# Patient Record
Sex: MALE | NOT HISPANIC OR LATINO | URBAN - METROPOLITAN AREA
[De-identification: names, ages, dates, MRNs, and addresses within clinical notes are randomized per-mention and may not be internally consistent; named-entity substitution may affect disease eponyms.]

---

## 2023-06-08 PROBLEM — F19.959 SUBSTANCE-INDUCED PSYCHOTIC DISORDER: Status: ACTIVE | Noted: 2023-06-08

## 2023-06-08 PROBLEM — F20.9 SCHIZOPHRENIA: Status: ACTIVE | Noted: 2023-06-08

## 2023-06-08 PROBLEM — F12.90 CANNABIS USE DISORDER: Status: ACTIVE | Noted: 2023-06-08

## 2023-07-02 ENCOUNTER — HOSPITAL ENCOUNTER (EMERGENCY)
Facility: HOSPITAL | Age: 23
Discharge: HOME | End: 2023-07-02
Attending: EMERGENCY MEDICINE
Payer: MEDICAID

## 2023-07-02 VITALS
OXYGEN SATURATION: 97 % | HEART RATE: 81 BPM | RESPIRATION RATE: 16 BRPM | WEIGHT: 155 LBS | TEMPERATURE: 98 F | SYSTOLIC BLOOD PRESSURE: 139 MMHG | DIASTOLIC BLOOD PRESSURE: 68 MMHG | HEIGHT: 63 IN | BODY MASS INDEX: 27.46 KG/M2

## 2023-07-02 DIAGNOSIS — R25.2 MUSCLE CRAMPS: ICD-10-CM

## 2023-07-02 DIAGNOSIS — G24.01 TARDIVE DYSKINESIA: Primary | ICD-10-CM

## 2023-07-02 DIAGNOSIS — F12.90 MARIJUANA USE: ICD-10-CM

## 2023-07-02 LAB
ALBUMIN SERPL-MCNC: 4 G/DL (ref 3.5–5)
ALBUMIN/GLOB SERPL: 1.3 RATIO (ref 1.1–2)
ALP SERPL-CCNC: 47 UNIT/L (ref 40–150)
ALT SERPL-CCNC: 97 UNIT/L (ref 0–55)
AMPHET UR QL SCN: NEGATIVE
APPEARANCE UR: CLEAR
AST SERPL-CCNC: 36 UNIT/L (ref 5–34)
BACTERIA #/AREA URNS AUTO: NORMAL /HPF
BARBITURATE SCN PRESENT UR: NEGATIVE
BASOPHILS # BLD AUTO: 0.08 X10(3)/MCL
BASOPHILS NFR BLD AUTO: 0.9 %
BENZODIAZ UR QL SCN: NEGATIVE
BILIRUB UR QL STRIP.AUTO: NEGATIVE MG/DL
BILIRUBIN DIRECT+TOT PNL SERPL-MCNC: 0.5 MG/DL
BUN SERPL-MCNC: 15.2 MG/DL (ref 8.9–20.6)
CALCIUM SERPL-MCNC: 9.7 MG/DL (ref 8.4–10.2)
CANNABINOIDS UR QL SCN: POSITIVE
CHLORIDE SERPL-SCNC: 105 MMOL/L (ref 98–107)
CK SERPL-CCNC: 128 U/L (ref 30–200)
CO2 SERPL-SCNC: 29 MMOL/L (ref 22–29)
COCAINE UR QL SCN: NEGATIVE
COLOR UR: YELLOW
CREAT SERPL-MCNC: 1.03 MG/DL (ref 0.73–1.18)
EOSINOPHIL # BLD AUTO: 0.34 X10(3)/MCL (ref 0–0.9)
EOSINOPHIL NFR BLD AUTO: 3.7 %
ERYTHROCYTE [DISTWIDTH] IN BLOOD BY AUTOMATED COUNT: 11.8 % (ref 11.5–17)
FENTANYL UR QL SCN: NEGATIVE
GFR SERPLBLD CREATININE-BSD FMLA CKD-EPI: >60 MLS/MIN/1.73/M2
GLOBULIN SER-MCNC: 3.1 GM/DL (ref 2.4–3.5)
GLUCOSE SERPL-MCNC: 98 MG/DL (ref 74–100)
GLUCOSE UR QL STRIP.AUTO: NEGATIVE MG/DL
HCT VFR BLD AUTO: 41.2 % (ref 42–52)
HGB BLD-MCNC: 13.6 G/DL (ref 14–18)
IMM GRANULOCYTES # BLD AUTO: 0.35 X10(3)/MCL (ref 0–0.04)
IMM GRANULOCYTES NFR BLD AUTO: 3.9 %
KETONES UR QL STRIP.AUTO: NEGATIVE MG/DL
LEUKOCYTE ESTERASE UR QL STRIP.AUTO: NEGATIVE UNIT/L
LYMPHOCYTES # BLD AUTO: 1.38 X10(3)/MCL (ref 0.6–4.6)
LYMPHOCYTES NFR BLD AUTO: 15.2 %
MAGNESIUM SERPL-MCNC: 2.2 MG/DL (ref 1.6–2.6)
MCH RBC QN AUTO: 31.8 PG (ref 27–31)
MCHC RBC AUTO-ENTMCNC: 33 G/DL (ref 33–36)
MCV RBC AUTO: 96.3 FL (ref 80–94)
MDMA UR QL SCN: NEGATIVE
MONOCYTES # BLD AUTO: 0.86 X10(3)/MCL (ref 0.1–1.3)
MONOCYTES NFR BLD AUTO: 9.5 %
NEUTROPHILS # BLD AUTO: 6.08 X10(3)/MCL (ref 2.1–9.2)
NEUTROPHILS NFR BLD AUTO: 66.8 %
NITRITE UR QL STRIP.AUTO: NEGATIVE
NRBC BLD AUTO-RTO: 0 %
OPIATES UR QL SCN: NEGATIVE
PCP UR QL: NEGATIVE
PH UR STRIP.AUTO: 8 [PH]
PH UR: 8 [PH] (ref 3–11)
PLATELET # BLD AUTO: 326 X10(3)/MCL (ref 130–400)
PMV BLD AUTO: 9.2 FL (ref 7.4–10.4)
POTASSIUM SERPL-SCNC: 4 MMOL/L (ref 3.5–5.1)
PROT SERPL-MCNC: 7.1 GM/DL (ref 6.4–8.3)
PROT UR QL STRIP.AUTO: NEGATIVE MG/DL
RBC # BLD AUTO: 4.28 X10(6)/MCL (ref 4.7–6.1)
RBC #/AREA URNS AUTO: <5 /HPF
RBC UR QL AUTO: NEGATIVE UNIT/L
SODIUM SERPL-SCNC: 142 MMOL/L (ref 136–145)
SP GR UR STRIP.AUTO: 1.01 (ref 1–1.03)
SPECIFIC GRAVITY, URINE AUTO (.000) (OHS): 1.01 (ref 1–1.03)
SQUAMOUS #/AREA URNS AUTO: <5 /HPF
UROBILINOGEN UR STRIP-ACNC: 0.2 MG/DL
WBC # SPEC AUTO: 9.09 X10(3)/MCL (ref 4.5–11.5)
WBC #/AREA URNS AUTO: <5 /HPF

## 2023-07-02 PROCEDURE — 82550 ASSAY OF CK (CPK): CPT | Performed by: EMERGENCY MEDICINE

## 2023-07-02 PROCEDURE — 83735 ASSAY OF MAGNESIUM: CPT | Performed by: EMERGENCY MEDICINE

## 2023-07-02 PROCEDURE — 99284 EMERGENCY DEPT VISIT MOD MDM: CPT

## 2023-07-02 PROCEDURE — 93010 ELECTROCARDIOGRAM REPORT: CPT | Mod: ,,, | Performed by: INTERNAL MEDICINE

## 2023-07-02 PROCEDURE — 81001 URINALYSIS AUTO W/SCOPE: CPT | Performed by: NURSE PRACTITIONER

## 2023-07-02 PROCEDURE — 85025 COMPLETE CBC W/AUTO DIFF WBC: CPT | Performed by: NURSE PRACTITIONER

## 2023-07-02 PROCEDURE — 93005 ELECTROCARDIOGRAM TRACING: CPT

## 2023-07-02 PROCEDURE — 96372 THER/PROPH/DIAG INJ SC/IM: CPT | Performed by: EMERGENCY MEDICINE

## 2023-07-02 PROCEDURE — 93010 EKG 12-LEAD: ICD-10-PCS | Mod: ,,, | Performed by: INTERNAL MEDICINE

## 2023-07-02 PROCEDURE — 80053 COMPREHEN METABOLIC PANEL: CPT | Performed by: NURSE PRACTITIONER

## 2023-07-02 PROCEDURE — 80307 DRUG TEST PRSMV CHEM ANLYZR: CPT | Performed by: EMERGENCY MEDICINE

## 2023-07-02 PROCEDURE — 63600175 PHARM REV CODE 636 W HCPCS: Performed by: EMERGENCY MEDICINE

## 2023-07-02 RX ORDER — BENZTROPINE MESYLATE 1 MG/ML
1 INJECTION, SOLUTION INTRAMUSCULAR; INTRAVENOUS
Status: COMPLETED | OUTPATIENT
Start: 2023-07-02 | End: 2023-07-02

## 2023-07-02 RX ORDER — BENZTROPINE MESYLATE 1 MG/1
1 TABLET ORAL 2 TIMES DAILY
Qty: 60 TABLET | Refills: 1 | Status: SHIPPED | OUTPATIENT
Start: 2023-07-02 | End: 2024-07-01

## 2023-07-02 RX ADMIN — BENZTROPINE MESYLATE 1 MG: 1 INJECTION INTRAMUSCULAR; INTRAVENOUS at 01:07

## 2023-07-02 NOTE — FIRST PROVIDER EVALUATION
Medical screening examination initiated.  I have conducted a focused provider triage encounter, findings are as follows:    Brief history of present illness:  22y/o M presents to the ED with mother reports concerns to his Invega injection. Mother reports stiff muscles    There were no vitals filed for this visit.    Pertinent physical exam:  AAA x 3    Brief workup plan:  Labs/EKG    Preliminary workup initiated; this workup will be continued and followed by the physician or advanced practice provider that is assigned to the patient when roomed.

## 2023-07-02 NOTE — DISCHARGE INSTRUCTIONS
Pt to triage with mask, c/o epigastric pain x 1 hour. Denies pain meds PTA. Reports nausea, denies vomiting.   Pt yelling, hyperventilating in triage Thanks for letting us take care of you today!  It is our goal to give you courteous care and to keep you comfortable and informed, if you have any questions before you leave I will be happy to try and answer them.    Here is some advice after your visit:      Your visit in the emergency department is NOT definitive care - please follow-up with your primary care doctor and/or specialist within 1-2 days.  Please return if you have any worsening in your condition or if you have any other concerns.    If you had radiology exams like an XRAY or CT in the emergency Department the interpreation on them may be preliminary - there may be less time sensitive findings on the reports please obtain these reports within 24 hours from the hospital or by using your out on your mobile phone to access records.  Bring these to your primary care doctor and/or specialist for further review of incidental findings.    Please review any LAB WORK from your visit today with your primary care physician.    If you were prescribed OPIATE PAIN MEDICATION - please understand of these medications can be addictive, you may fill less of the prescription was written for, you do not have to take the full prescription.  You may discard what you do not use.  Please seek help if you feel you are having problems with addiction.  Do not drive or operate heavy machinery if you are taking sedating medications.  Do not mix these medications with alcohol.      If you had a SPLINT placed in the emergency department if you have severe pain numbness tingling or discoloration of year digits please remove the splint and return to the emergency department for further evaluation as this may represent a sign of compromise to the nerves or blood vessels due to swelling.    If you had SUTURES in the emergency department please have them removed in the prescribed time frame typically within 7-14 days.  You may shower but please do not bathe or swim.  Keep the wounds  clean and dry and covered with a clean dressing.  Please return if he have any signs of infection like redness or drainage or pain at the suture site.    Please take the full course of  any ANTIBIOTICS you were prescribed - incomplete courses of antibiotics can cause resistance to antibiotics in the future which will make it difficult to treat any infections you may have.

## 2023-07-02 NOTE — ED PROVIDER NOTES
Encounter Date: 7/2/2023    SCRIBE #1 NOTE: I, Mo Bender, am scribing for, and in the presence of,  Dr. Gray. I have scribed the following portions of the note - Other sections scribed: HPI, ROS, Physical Exam, MDM, Attending.     History     Chief Complaint   Patient presents with    Medication Reaction     C/o muscle twitching and stiffening since starting Invega injections a week ago. Pt states it is hard for him to open his mouth and talk.      24 y/o male with history of schizophrenia presents to ED with mother for muscle stiffening and twitching onset about a week ago after starting Invega injections per mother.  Mother states initially, pt was pacing a lot, so he was started on propranolol and trazodone by his psychiatrist to help him sleep.  Pt has never had similar symptoms.  He does not take cogentin or benadryl.      The history is provided by a parent.   Medication Reaction  This is a new problem. Episode onset: about a week ago. The problem occurs constantly. The problem has not changed since onset.Pertinent negatives include no chest pain, no abdominal pain, no headaches and no shortness of breath.   Review of patient's allergies indicates:  No Known Allergies  Past Medical History:   Diagnosis Date    Schizophrenia, unspecified      No past surgical history on file.  No family history on file.  Social History     Tobacco Use    Smoking status: Every Day     Types: Vaping with nicotine   Substance Use Topics    Alcohol use: Yes    Drug use: Not Currently     Review of Systems   Constitutional:  Negative for chills and fever.   HENT:  Negative for congestion and ear pain.    Eyes:  Negative for discharge.   Respiratory:  Negative for cough, shortness of breath and wheezing.    Cardiovascular:  Negative for chest pain and leg swelling.   Gastrointestinal:  Negative for abdominal pain, constipation, diarrhea, nausea and vomiting.   Genitourinary:  Negative for dysuria, flank pain and frequency.    Musculoskeletal:  Negative for back pain and joint swelling.        Muscle stiffness/twitching    Skin:  Negative for rash.   Neurological:  Negative for dizziness, weakness and headaches.   Psychiatric/Behavioral:  Negative for agitation, confusion and hallucinations.      Physical Exam     Initial Vitals [07/02/23 1259]   BP Pulse Resp Temp SpO2   138/84 100 16 97.7 °F (36.5 °C) 95 %      MAP       --         Physical Exam    Nursing note and vitals reviewed.  Constitutional: He appears well-developed. No distress.   HENT:   Head: Normocephalic and atraumatic.   Mouth/Throat: Oropharynx is clear and moist.   No rash to face; no desquamation of eyes or mouth   Eyes: Conjunctivae and EOM are normal. Pupils are equal, round, and reactive to light.   Neck: Neck supple.   Cardiovascular:  Normal rate and regular rhythm.           No murmur heard.  Pulmonary/Chest: Breath sounds normal. No respiratory distress. He exhibits no tenderness.   Abdominal: Abdomen is soft. Bowel sounds are normal. He exhibits no distension. There is no abdominal tenderness.   Musculoskeletal:         General: Normal range of motion.      Cervical back: Neck supple.      Lumbar back: Normal. No tenderness. Normal range of motion.     Neurological: He is alert and oriented to person, place, and time. He has normal strength. No cranial nerve deficit or sensory deficit.   Psychiatric: Judgment normal.   Flat affect; no psychomotor agitation        ED Course   Procedures  Labs Reviewed   COMPREHENSIVE METABOLIC PANEL - Abnormal; Notable for the following components:       Result Value    Alanine Aminotransferase 97 (*)     Aspartate Aminotransferase 36 (*)     All other components within normal limits   CBC WITH DIFFERENTIAL - Abnormal; Notable for the following components:    RBC 4.28 (*)     Hgb 13.6 (*)     Hct 41.2 (*)     MCV 96.3 (*)     MCH 31.8 (*)     IG# 0.35 (*)     All other components within normal limits   DRUG SCREEN, URINE  (BEAKER) - Abnormal; Notable for the following components:    Cannabinoids, Urine Positive (*)     All other components within normal limits    Narrative:     Cut off concentrations:    Amphetamines - 1000 ng/ml  Barbiturates - 200 ng/ml  Benzodiazepine - 200 ng/ml  Cannabinoids (THC) - 50 ng/ml  Cocaine - 300 ng/ml  Fentanyl - 1.0 ng/ml  MDMA - 500 ng/ml  Opiates - 300 ng/ml   Phencyclidine (PCP) - 25 ng/ml    Specimen submitted for drug analysis and tested for pH and specific gravity in order to evaluate sample integrity. Suspect tampering if specific gravity is <1.003 and/or pH is not within the range of 4.5 - 8.0  False negatives may result form substances such as bleach added to urine.  False positives may result for the presence of a substance with similar chemical structure to the drug or its metabolite.    This test provides only a PRELIMINARY analytical test result. A more specific alternate chemical method must be used in order to obtain a confirmed analytical result. Gas chromatography/mass spectrometry (GC/MS) is the preferred confirmatory method. Other chemical confirmation methods are available. Clinical consideration and professional judgement should be applied to any drug of abuse test result, particularly when preliminary positive results are used.    Positive results will be confirmed only at the physicians request. Unconfirmed screening results are to be used only for medical purposes (treatment).        URINALYSIS, REFLEX TO URINE CULTURE - Normal   CK - Normal   MAGNESIUM - Normal   URINALYSIS, MICROSCOPIC - Normal   CBC W/ AUTO DIFFERENTIAL    Narrative:     The following orders were created for panel order CBC Auto Differential.  Procedure                               Abnormality         Status                     ---------                               -----------         ------                     CBC with Differential[[id number]]        Abnormal            Final result                  Please view results for these tests on the individual orders.          Imaging Results    None          Medications   benztropine mesylate injection 1 mg (1 mg Intramuscular Given 7/2/23 1320)              Scribe Attestation:   Scribe #1: I performed the above scribed service and the documentation accurately describes the services I performed. I attest to the accuracy of the note.    Attending Attestation:           Physician Attestation for Scribe:  Physician Attestation Statement for Scribe #1: I, reviewed documentation, as scribed by Mo Bender in my presence, and it is both accurate and complete.         Medical Decision Making  Differential diagnoses include, but are not limited to: tardive dyskinesia, neuroleptic malignant syndrome     Amount and/or Complexity of Data Reviewed  Independent Historian: parent     Details: muscle stiffening and twitching onset about a week ago after starting Invega injections per mother.  Mother states initially, pt was pacing a lot, so he was started on propranolol and trazodone by his psychiatrist to help him sleep.  Pt has never had similar symptoms.  He does not take cogentin or benadryl.  Labs: ordered.                         Clinical Impression:   Final diagnoses:  [R25.2] Muscle cramps  [G24.01] Tardive dyskinesia (Primary)  [F12.90] Marijuana use        ED Disposition Condition    Discharge Stable          ED Prescriptions       Medication Sig Dispense Start Date End Date Auth. Provider    benztropine (COGENTIN) 1 MG tablet Take 1 tablet (1 mg total) by mouth 2 (two) times daily. 60 tablet 7/2/2023 7/1/2024 Osorio Gray MD          Follow-up Information       Follow up With Specialties Details Why Contact Info    PCP  Call in 1 day  follow up with PCP ni 1-2 days             Osorio Gray MD  07/02/23 2901

## 2024-05-08 ENCOUNTER — HOSPITAL ENCOUNTER (INPATIENT)
Facility: HOSPITAL | Age: 24
LOS: 7 days | Discharge: NURSING, REHAB OR LONG-TERM CARE | DRG: 956 | End: 2024-05-15
Attending: EMERGENCY MEDICINE | Admitting: STUDENT IN AN ORGANIZED HEALTH CARE EDUCATION/TRAINING PROGRAM
Payer: COMMERCIAL

## 2024-05-08 ENCOUNTER — ANESTHESIA (OUTPATIENT)
Dept: SURGERY | Facility: HOSPITAL | Age: 24
DRG: 956 | End: 2024-05-08
Payer: COMMERCIAL

## 2024-05-08 ENCOUNTER — ANESTHESIA EVENT (OUTPATIENT)
Dept: SURGERY | Facility: HOSPITAL | Age: 24
DRG: 956 | End: 2024-05-08
Payer: COMMERCIAL

## 2024-05-08 DIAGNOSIS — S92.301A CLOSED NONDISPLACED FRACTURE OF METATARSAL BONE OF RIGHT FOOT, UNSPECIFIED METATARSAL, INITIAL ENCOUNTER: ICD-10-CM

## 2024-05-08 DIAGNOSIS — S09.90XA CLOSED HEAD INJURY, INITIAL ENCOUNTER: ICD-10-CM

## 2024-05-08 DIAGNOSIS — S01.81XA FACIAL LACERATION, INITIAL ENCOUNTER: ICD-10-CM

## 2024-05-08 DIAGNOSIS — S52.614A CLOSED NONDISPLACED FRACTURE OF STYLOID PROCESS OF RIGHT ULNA, INITIAL ENCOUNTER: ICD-10-CM

## 2024-05-08 DIAGNOSIS — S82.891B TYPE I OR II OPEN FRACTURE OF RIGHT ANKLE, INITIAL ENCOUNTER: ICD-10-CM

## 2024-05-08 DIAGNOSIS — S82.141A CLOSED FRACTURE OF RIGHT TIBIAL PLATEAU, INITIAL ENCOUNTER: ICD-10-CM

## 2024-05-08 DIAGNOSIS — S52.501A CLOSED FRACTURE OF DISTAL END OF RIGHT RADIUS, UNSPECIFIED FRACTURE MORPHOLOGY, INITIAL ENCOUNTER: ICD-10-CM

## 2024-05-08 DIAGNOSIS — S32.591A CLOSED FRACTURE OF RAMUS OF RIGHT PUBIS, INITIAL ENCOUNTER: ICD-10-CM

## 2024-05-08 DIAGNOSIS — S27.321A CONTUSION OF RIGHT LUNG, INITIAL ENCOUNTER: ICD-10-CM

## 2024-05-08 DIAGNOSIS — T14.90XA TRAUMA: ICD-10-CM

## 2024-05-08 DIAGNOSIS — S62.235A CLOSED NONDISPLACED FRACTURE OF BASE OF FIRST METACARPAL BONE OF LEFT HAND, UNSPECIFIED FRACTURE MORPHOLOGY, INITIAL ENCOUNTER: ICD-10-CM

## 2024-05-08 DIAGNOSIS — S22.32XA CLOSED FRACTURE OF ONE RIB OF LEFT SIDE, INITIAL ENCOUNTER: ICD-10-CM

## 2024-05-08 DIAGNOSIS — S32.401A CLOSED NONDISPLACED FRACTURE OF RIGHT ACETABULUM, UNSPECIFIED PORTION OF ACETABULUM, INITIAL ENCOUNTER: ICD-10-CM

## 2024-05-08 DIAGNOSIS — S72.301A CLOSED FRACTURE OF SHAFT OF RIGHT FEMUR, UNSPECIFIED FRACTURE MORPHOLOGY, INITIAL ENCOUNTER: Primary | ICD-10-CM

## 2024-05-08 PROBLEM — N17.9 AKI (ACUTE KIDNEY INJURY): Status: ACTIVE | Noted: 2024-05-08

## 2024-05-08 PROBLEM — S92.901A CLOSED FRACTURE OF RIGHT FOOT: Status: ACTIVE | Noted: 2024-05-08

## 2024-05-08 PROBLEM — S32.599A CLOSED FRACTURE OF PUBIC RAMUS: Status: ACTIVE | Noted: 2024-05-08

## 2024-05-08 PROBLEM — D72.829 LEUKOCYTOSIS: Status: ACTIVE | Noted: 2024-05-08

## 2024-05-08 PROBLEM — S52.201A FRACTURE OF RIGHT ULNA: Status: ACTIVE | Noted: 2024-05-08

## 2024-05-08 PROBLEM — E87.20 LACTIC ACIDOSIS: Status: ACTIVE | Noted: 2024-05-08

## 2024-05-08 PROBLEM — S27.329A PULMONARY CONTUSION: Status: ACTIVE | Noted: 2024-05-08

## 2024-05-08 PROBLEM — S82.001A CLOSED FRACTURE OF RIGHT PATELLA: Status: ACTIVE | Noted: 2024-05-08

## 2024-05-08 PROBLEM — S72.8X1A OTHER FRACTURE OF RIGHT FEMUR, INITIAL ENCOUNTER FOR CLOSED FRACTURE: Status: ACTIVE | Noted: 2024-05-08

## 2024-05-08 LAB
ABORH RETYPE: NORMAL
ABS NEUT (OLG): 26.11 X10(3)/MCL (ref 2.1–9.2)
ALBUMIN SERPL-MCNC: 4.6 G/DL (ref 3.5–5)
ALBUMIN/GLOB SERPL: 1.3 RATIO (ref 1.1–2)
ALP SERPL-CCNC: 73 UNIT/L (ref 40–150)
ALT SERPL-CCNC: 101 UNIT/L (ref 0–55)
APTT PPP: 28.8 SECONDS (ref 23.2–33.7)
AST SERPL-CCNC: 116 UNIT/L (ref 5–34)
BILIRUB SERPL-MCNC: 0.7 MG/DL
BUN SERPL-MCNC: 15.2 MG/DL (ref 8.9–20.6)
CALCIUM SERPL-MCNC: 9.5 MG/DL (ref 8.4–10.2)
CHLORIDE SERPL-SCNC: 102 MMOL/L (ref 98–107)
CK SERPL-CCNC: 870 U/L (ref 30–200)
CO2 SERPL-SCNC: 23 MMOL/L (ref 22–29)
CREAT SERPL-MCNC: 1.39 MG/DL (ref 0.73–1.18)
ERYTHROCYTE [DISTWIDTH] IN BLOOD BY AUTOMATED COUNT: 11.9 % (ref 11.5–17)
ETHANOL SERPL-MCNC: <10 MG/DL
GFR SERPLBLD CREATININE-BSD FMLA CKD-EPI: >60 ML/MIN/1.73/M2
GLOBULIN SER-MCNC: 3.6 GM/DL (ref 2.4–3.5)
GLUCOSE SERPL-MCNC: 183 MG/DL (ref 74–100)
GROUP & RH: NORMAL
HCT VFR BLD AUTO: 45.9 % (ref 42–52)
HGB BLD-MCNC: 15.9 G/DL (ref 14–18)
INDIRECT COOMBS: NORMAL
INR PPP: 1.1
INSTRUMENT WBC (OLG): 30.36 X10(3)/MCL
LACTATE SERPL-SCNC: 2.7 MMOL/L (ref 0.5–2.2)
LACTATE SERPL-SCNC: 4.4 MMOL/L (ref 0.5–2.2)
LACTATE SERPL-SCNC: 4.4 MMOL/L (ref 0.5–2.2)
LYMPHOCYTES NFR BLD MANUAL: 10 %
LYMPHOCYTES NFR BLD MANUAL: 3.04 X10(3)/MCL
MCH RBC QN AUTO: 31.4 PG (ref 27–31)
MCHC RBC AUTO-ENTMCNC: 34.6 G/DL (ref 33–36)
MCV RBC AUTO: 90.7 FL (ref 80–94)
MONOCYTES NFR BLD MANUAL: 1.21 X10(3)/MCL (ref 0.1–1.3)
MONOCYTES NFR BLD MANUAL: 4 %
NEUTROPHILS NFR BLD MANUAL: 86 %
NRBC BLD AUTO-RTO: 0 %
PLATELET # BLD AUTO: 367 X10(3)/MCL (ref 130–400)
PLATELET # BLD EST: NORMAL 10*3/UL
PMV BLD AUTO: 10.1 FL (ref 7.4–10.4)
POTASSIUM SERPL-SCNC: 4.7 MMOL/L (ref 3.5–5.1)
PROT SERPL-MCNC: 8.2 GM/DL (ref 6.4–8.3)
PROTHROMBIN TIME: 13.7 SECONDS (ref 12.5–14.5)
RBC # BLD AUTO: 5.06 X10(6)/MCL (ref 4.7–6.1)
RBC MORPH BLD: NORMAL
SODIUM SERPL-SCNC: 139 MMOL/L (ref 136–145)
SPECIMEN OUTDATE: NORMAL
WBC # SPEC AUTO: 30.36 X10(3)/MCL (ref 4.5–11.5)

## 2024-05-08 PROCEDURE — 82550 ASSAY OF CK (CPK): CPT | Performed by: NURSE PRACTITIONER

## 2024-05-08 PROCEDURE — 83605 ASSAY OF LACTIC ACID: CPT | Performed by: STUDENT IN AN ORGANIZED HEALTH CARE EDUCATION/TRAINING PROGRAM

## 2024-05-08 PROCEDURE — 25000003 PHARM REV CODE 250: Performed by: REHABILITATION UNIT

## 2024-05-08 PROCEDURE — 86850 RBC ANTIBODY SCREEN: CPT | Performed by: STUDENT IN AN ORGANIZED HEALTH CARE EDUCATION/TRAINING PROGRAM

## 2024-05-08 PROCEDURE — 36000708 HC OR TIME LEV III 1ST 15 MIN: Performed by: REHABILITATION UNIT

## 2024-05-08 PROCEDURE — 20650 INSERT AND REMOVE BONE PIN: CPT | Mod: 51,,, | Performed by: REHABILITATION UNIT

## 2024-05-08 PROCEDURE — D9220A PRA ANESTHESIA: Mod: ANES,,, | Performed by: ANESTHESIOLOGY

## 2024-05-08 PROCEDURE — 0HQ0XZZ REPAIR SCALP SKIN, EXTERNAL APPROACH: ICD-10-PCS | Performed by: EMERGENCY MEDICINE

## 2024-05-08 PROCEDURE — 25605 CLTX DST RDL FX/EPHYS SEP W/: CPT | Mod: RT

## 2024-05-08 PROCEDURE — 83605 ASSAY OF LACTIC ACID: CPT | Performed by: REHABILITATION UNIT

## 2024-05-08 PROCEDURE — 90715 TDAP VACCINE 7 YRS/> IM: CPT | Performed by: STUDENT IN AN ORGANIZED HEALTH CARE EDUCATION/TRAINING PROGRAM

## 2024-05-08 PROCEDURE — 12011 RPR F/E/E/N/L/M 2.5 CM/<: CPT

## 2024-05-08 PROCEDURE — 25500020 PHARM REV CODE 255: Performed by: STUDENT IN AN ORGANIZED HEALTH CARE EDUCATION/TRAINING PROGRAM

## 2024-05-08 PROCEDURE — G0390 TRAUMA RESPONS W/HOSP CRITI: HCPCS

## 2024-05-08 PROCEDURE — 71000033 HC RECOVERY, INTIAL HOUR: Performed by: REHABILITATION UNIT

## 2024-05-08 PROCEDURE — 90471 IMMUNIZATION ADMIN: CPT | Performed by: STUDENT IN AN ORGANIZED HEALTH CARE EDUCATION/TRAINING PROGRAM

## 2024-05-08 PROCEDURE — 25000003 PHARM REV CODE 250: Performed by: NURSE ANESTHETIST, CERTIFIED REGISTERED

## 2024-05-08 PROCEDURE — 36000709 HC OR TIME LEV III EA ADD 15 MIN: Performed by: REHABILITATION UNIT

## 2024-05-08 PROCEDURE — 63600175 PHARM REV CODE 636 W HCPCS: Performed by: NURSE ANESTHETIST, CERTIFIED REGISTERED

## 2024-05-08 PROCEDURE — 26600 TREAT METACARPAL FRACTURE: CPT | Mod: 51,FA,, | Performed by: REHABILITATION UNIT

## 2024-05-08 PROCEDURE — 3E0234Z INTRODUCTION OF SERUM, TOXOID AND VACCINE INTO MUSCLE, PERCUTANEOUS APPROACH: ICD-10-PCS | Performed by: SURGERY

## 2024-05-08 PROCEDURE — 63600175 PHARM REV CODE 636 W HCPCS: Performed by: NURSE PRACTITIONER

## 2024-05-08 PROCEDURE — D9220A PRA ANESTHESIA: Mod: CRNA,,, | Performed by: NURSE ANESTHETIST, CERTIFIED REGISTERED

## 2024-05-08 PROCEDURE — 11000001 HC ACUTE MED/SURG PRIVATE ROOM

## 2024-05-08 PROCEDURE — 99285 EMERGENCY DEPT VISIT HI MDM: CPT | Mod: 25

## 2024-05-08 PROCEDURE — 82077 ASSAY SPEC XCP UR&BREATH IA: CPT | Performed by: STUDENT IN AN ORGANIZED HEALTH CARE EDUCATION/TRAINING PROGRAM

## 2024-05-08 PROCEDURE — 80053 COMPREHEN METABOLIC PANEL: CPT | Performed by: STUDENT IN AN ORGANIZED HEALTH CARE EDUCATION/TRAINING PROGRAM

## 2024-05-08 PROCEDURE — 63600175 PHARM REV CODE 636 W HCPCS: Performed by: REHABILITATION UNIT

## 2024-05-08 PROCEDURE — 27792 TREATMENT OF ANKLE FRACTURE: CPT | Mod: RT,,, | Performed by: REHABILITATION UNIT

## 2024-05-08 PROCEDURE — 37000008 HC ANESTHESIA 1ST 15 MINUTES: Performed by: REHABILITATION UNIT

## 2024-05-08 PROCEDURE — 27201423 OPTIME MED/SURG SUP & DEVICES STERILE SUPPLY: Performed by: REHABILITATION UNIT

## 2024-05-08 PROCEDURE — 83605 ASSAY OF LACTIC ACID: CPT | Performed by: NURSE PRACTITIONER

## 2024-05-08 PROCEDURE — 28485 OPTX METATARSAL FX EACH: CPT | Mod: 51,RT,, | Performed by: REHABILITATION UNIT

## 2024-05-08 PROCEDURE — 0HQ1XZZ REPAIR FACE SKIN, EXTERNAL APPROACH: ICD-10-PCS | Performed by: EMERGENCY MEDICINE

## 2024-05-08 PROCEDURE — C1769 GUIDE WIRE: HCPCS | Performed by: REHABILITATION UNIT

## 2024-05-08 PROCEDURE — C1713 ANCHOR/SCREW BN/BN,TIS/BN: HCPCS | Performed by: REHABILITATION UNIT

## 2024-05-08 PROCEDURE — 63600175 PHARM REV CODE 636 W HCPCS

## 2024-05-08 PROCEDURE — 85610 PROTHROMBIN TIME: CPT | Performed by: STUDENT IN AN ORGANIZED HEALTH CARE EDUCATION/TRAINING PROGRAM

## 2024-05-08 PROCEDURE — 63600175 PHARM REV CODE 636 W HCPCS: Performed by: STUDENT IN AN ORGANIZED HEALTH CARE EDUCATION/TRAINING PROGRAM

## 2024-05-08 PROCEDURE — 85730 THROMBOPLASTIN TIME PARTIAL: CPT | Performed by: STUDENT IN AN ORGANIZED HEALTH CARE EDUCATION/TRAINING PROGRAM

## 2024-05-08 PROCEDURE — 12001 RPR S/N/AX/GEN/TRNK 2.5CM/<: CPT | Mod: 59

## 2024-05-08 PROCEDURE — 85025 COMPLETE CBC W/AUTO DIFF WBC: CPT | Performed by: STUDENT IN AN ORGANIZED HEALTH CARE EDUCATION/TRAINING PROGRAM

## 2024-05-08 PROCEDURE — 36415 COLL VENOUS BLD VENIPUNCTURE: CPT | Performed by: NURSE PRACTITIONER

## 2024-05-08 PROCEDURE — 11012 DEB SKIN BONE AT FX SITE: CPT | Mod: 51,,, | Performed by: REHABILITATION UNIT

## 2024-05-08 PROCEDURE — 85007 BL SMEAR W/DIFF WBC COUNT: CPT | Performed by: STUDENT IN AN ORGANIZED HEALTH CARE EDUCATION/TRAINING PROGRAM

## 2024-05-08 PROCEDURE — P9047 ALBUMIN (HUMAN), 25%, 50ML: HCPCS | Mod: JZ,JG | Performed by: NURSE ANESTHETIST, CERTIFIED REGISTERED

## 2024-05-08 PROCEDURE — 99223 1ST HOSP IP/OBS HIGH 75: CPT | Mod: 57,,, | Performed by: REHABILITATION UNIT

## 2024-05-08 PROCEDURE — 0QSN34Z REPOSITION RIGHT METATARSAL WITH INTERNAL FIXATION DEVICE, PERCUTANEOUS APPROACH: ICD-10-PCS | Performed by: REHABILITATION UNIT

## 2024-05-08 PROCEDURE — 2W6QXZZ TRACTION OF RIGHT LOWER LEG: ICD-10-PCS | Performed by: REHABILITATION UNIT

## 2024-05-08 PROCEDURE — 0QSJ04Z REPOSITION RIGHT FIBULA WITH INTERNAL FIXATION DEVICE, OPEN APPROACH: ICD-10-PCS | Performed by: REHABILITATION UNIT

## 2024-05-08 PROCEDURE — 37000009 HC ANESTHESIA EA ADD 15 MINS: Performed by: REHABILITATION UNIT

## 2024-05-08 PROCEDURE — 96365 THER/PROPH/DIAG IV INF INIT: CPT

## 2024-05-08 PROCEDURE — 63600175 PHARM REV CODE 636 W HCPCS: Performed by: EMERGENCY MEDICINE

## 2024-05-08 PROCEDURE — 25000003 PHARM REV CODE 250: Performed by: NURSE PRACTITIONER

## 2024-05-08 DEVICE — SCREW CORTEX 2.7 X 18: Type: IMPLANTABLE DEVICE | Site: ANKLE | Status: FUNCTIONAL

## 2024-05-08 DEVICE — KWIRE TRCR SNGL .062X9IN 1.6MM: Type: IMPLANTABLE DEVICE | Site: TOE | Status: FUNCTIONAL

## 2024-05-08 DEVICE — SCREW BONE LOCK 2.7X10MM: Type: IMPLANTABLE DEVICE | Site: ANKLE | Status: FUNCTIONAL

## 2024-05-08 DEVICE — IMPLANTABLE DEVICE: Type: IMPLANTABLE DEVICE | Site: FEMUR | Status: FUNCTIONAL

## 2024-05-08 DEVICE — SCREW CORTEX 2.7 X 16.: Type: IMPLANTABLE DEVICE | Site: ANKLE | Status: FUNCTIONAL

## 2024-05-08 DEVICE — KWIRE TRCR SNGL .045X9IN 1.1MM: Type: IMPLANTABLE DEVICE | Site: TOE | Status: FUNCTIONAL

## 2024-05-08 DEVICE — SCREW 2.7MM X 18 LOCKING ANGLE: Type: IMPLANTABLE DEVICE | Site: ANKLE | Status: FUNCTIONAL

## 2024-05-08 DEVICE — IMPLANTABLE DEVICE: Type: IMPLANTABLE DEVICE | Site: ANKLE | Status: FUNCTIONAL

## 2024-05-08 DEVICE — SCREW 2.7MM X 16 LOCKING ANGLE: Type: IMPLANTABLE DEVICE | Site: ANKLE | Status: FUNCTIONAL

## 2024-05-08 RX ORDER — TALC
6 POWDER (GRAM) TOPICAL NIGHTLY PRN
Status: DISCONTINUED | OUTPATIENT
Start: 2024-05-08 | End: 2024-05-15 | Stop reason: HOSPADM

## 2024-05-08 RX ORDER — ENOXAPARIN SODIUM 100 MG/ML
40 INJECTION SUBCUTANEOUS EVERY 12 HOURS
Status: DISCONTINUED | OUTPATIENT
Start: 2024-05-08 | End: 2024-05-15 | Stop reason: HOSPADM

## 2024-05-08 RX ORDER — POLYETHYLENE GLYCOL 3350 17 G/17G
17 POWDER, FOR SOLUTION ORAL 2 TIMES DAILY
Status: DISCONTINUED | OUTPATIENT
Start: 2024-05-08 | End: 2024-05-15 | Stop reason: HOSPADM

## 2024-05-08 RX ORDER — DEXAMETHASONE SODIUM PHOSPHATE 4 MG/ML
INJECTION, SOLUTION INTRA-ARTICULAR; INTRALESIONAL; INTRAMUSCULAR; INTRAVENOUS; SOFT TISSUE
Status: DISCONTINUED | OUTPATIENT
Start: 2024-05-08 | End: 2024-05-08

## 2024-05-08 RX ORDER — FENTANYL CITRATE 50 UG/ML
INJECTION, SOLUTION INTRAMUSCULAR; INTRAVENOUS
Status: DISCONTINUED | OUTPATIENT
Start: 2024-05-08 | End: 2024-05-08

## 2024-05-08 RX ORDER — HYDROMORPHONE HYDROCHLORIDE 2 MG/ML
0.2 INJECTION, SOLUTION INTRAMUSCULAR; INTRAVENOUS; SUBCUTANEOUS EVERY 5 MIN PRN
Status: DISCONTINUED | OUTPATIENT
Start: 2024-05-08 | End: 2024-05-08

## 2024-05-08 RX ORDER — PROPOFOL 10 MG/ML
VIAL (ML) INTRAVENOUS
Status: DISCONTINUED | OUTPATIENT
Start: 2024-05-08 | End: 2024-05-08

## 2024-05-08 RX ORDER — LIDOCAINE HYDROCHLORIDE 10 MG/ML
1 INJECTION, SOLUTION EPIDURAL; INFILTRATION; INTRACAUDAL; PERINEURAL ONCE
Status: CANCELLED | OUTPATIENT
Start: 2024-05-08 | End: 2024-05-08

## 2024-05-08 RX ORDER — OXYCODONE HYDROCHLORIDE 10 MG/1
10 TABLET ORAL EVERY 4 HOURS PRN
Status: DISCONTINUED | OUTPATIENT
Start: 2024-05-08 | End: 2024-05-15 | Stop reason: HOSPADM

## 2024-05-08 RX ORDER — ACETAMINOPHEN 10 MG/ML
INJECTION, SOLUTION INTRAVENOUS
Status: DISCONTINUED | OUTPATIENT
Start: 2024-05-08 | End: 2024-05-08

## 2024-05-08 RX ORDER — ONDANSETRON HYDROCHLORIDE 2 MG/ML
INJECTION, SOLUTION INTRAMUSCULAR; INTRAVENOUS
Status: DISCONTINUED | OUTPATIENT
Start: 2024-05-08 | End: 2024-05-08

## 2024-05-08 RX ORDER — DOCUSATE SODIUM 100 MG/1
100 CAPSULE, LIQUID FILLED ORAL 2 TIMES DAILY
Status: DISCONTINUED | OUTPATIENT
Start: 2024-05-08 | End: 2024-05-11

## 2024-05-08 RX ORDER — ONDANSETRON HYDROCHLORIDE 2 MG/ML
INJECTION, SOLUTION INTRAVENOUS CODE/TRAUMA/SEDATION MEDICATION
Status: COMPLETED | OUTPATIENT
Start: 2024-05-08 | End: 2024-05-08

## 2024-05-08 RX ORDER — PROPOFOL 10 MG/ML
INJECTION, EMULSION INTRAVENOUS
Status: DISPENSED
Start: 2024-05-08 | End: 2024-05-09

## 2024-05-08 RX ORDER — PHENYLEPHRINE HYDROCHLORIDE 10 MG/ML
INJECTION INTRAVENOUS
Status: DISCONTINUED | OUTPATIENT
Start: 2024-05-08 | End: 2024-05-08

## 2024-05-08 RX ORDER — ADHESIVE BANDAGE
30 BANDAGE TOPICAL DAILY PRN
Status: DISCONTINUED | OUTPATIENT
Start: 2024-05-08 | End: 2024-05-15 | Stop reason: HOSPADM

## 2024-05-08 RX ORDER — MUPIROCIN 20 MG/G
OINTMENT TOPICAL 2 TIMES DAILY
Status: DISPENSED | OUTPATIENT
Start: 2024-05-08 | End: 2024-05-13

## 2024-05-08 RX ORDER — LIDOCAINE HYDROCHLORIDE 20 MG/ML
INJECTION, SOLUTION EPIDURAL; INFILTRATION; INTRACAUDAL; PERINEURAL
Status: DISCONTINUED | OUTPATIENT
Start: 2024-05-08 | End: 2024-05-08

## 2024-05-08 RX ORDER — SODIUM CHLORIDE 9 MG/ML
1000 INJECTION, SOLUTION INTRAVENOUS
Status: COMPLETED | OUTPATIENT
Start: 2024-05-08 | End: 2024-05-08

## 2024-05-08 RX ORDER — CEFAZOLIN SODIUM 1 G/3ML
INJECTION, POWDER, FOR SOLUTION INTRAMUSCULAR; INTRAVENOUS
Status: COMPLETED
Start: 2024-05-08 | End: 2024-05-08

## 2024-05-08 RX ORDER — CEFAZOLIN SODIUM 2 G/50ML
SOLUTION INTRAVENOUS
Status: COMPLETED | OUTPATIENT
Start: 2024-05-08 | End: 2024-05-08

## 2024-05-08 RX ORDER — CEFAZOLIN SODIUM 2 G/50ML
2 SOLUTION INTRAVENOUS
Status: DISCONTINUED | OUTPATIENT
Start: 2024-05-08 | End: 2024-05-12

## 2024-05-08 RX ORDER — DIPHENHYDRAMINE HYDROCHLORIDE 50 MG/ML
25 INJECTION INTRAMUSCULAR; INTRAVENOUS EVERY 6 HOURS PRN
Status: DISCONTINUED | OUTPATIENT
Start: 2024-05-08 | End: 2024-05-08

## 2024-05-08 RX ORDER — ACETAMINOPHEN 325 MG/1
650 TABLET ORAL EVERY 4 HOURS
Status: DISPENSED | OUTPATIENT
Start: 2024-05-08 | End: 2024-05-13

## 2024-05-08 RX ORDER — GABAPENTIN 300 MG/1
300 CAPSULE ORAL 3 TIMES DAILY
Status: DISCONTINUED | OUTPATIENT
Start: 2024-05-08 | End: 2024-05-15 | Stop reason: HOSPADM

## 2024-05-08 RX ORDER — CEFAZOLIN SODIUM 2 G/50ML
2 SOLUTION INTRAVENOUS ONCE
Status: DISCONTINUED | OUTPATIENT
Start: 2024-05-08 | End: 2024-05-08

## 2024-05-08 RX ORDER — MIDAZOLAM HYDROCHLORIDE 1 MG/ML
INJECTION INTRAMUSCULAR; INTRAVENOUS
Status: DISCONTINUED | OUTPATIENT
Start: 2024-05-08 | End: 2024-05-08

## 2024-05-08 RX ORDER — METHOCARBAMOL 500 MG/1
500 TABLET, FILM COATED ORAL EVERY 8 HOURS
Status: DISCONTINUED | OUTPATIENT
Start: 2024-05-08 | End: 2024-05-15 | Stop reason: HOSPADM

## 2024-05-08 RX ORDER — PROPOFOL 10 MG/ML
VIAL (ML) INTRAVENOUS CODE/TRAUMA/SEDATION MEDICATION
Status: COMPLETED | OUTPATIENT
Start: 2024-05-08 | End: 2024-05-08

## 2024-05-08 RX ORDER — ONDANSETRON HYDROCHLORIDE 2 MG/ML
4 INJECTION, SOLUTION INTRAVENOUS DAILY PRN
Status: DISCONTINUED | OUTPATIENT
Start: 2024-05-08 | End: 2024-05-08

## 2024-05-08 RX ORDER — OXYCODONE HYDROCHLORIDE 5 MG/1
5 TABLET ORAL EVERY 4 HOURS PRN
Status: DISCONTINUED | OUTPATIENT
Start: 2024-05-08 | End: 2024-05-15 | Stop reason: HOSPADM

## 2024-05-08 RX ORDER — SODIUM CHLORIDE, SODIUM GLUCONATE, SODIUM ACETATE, POTASSIUM CHLORIDE AND MAGNESIUM CHLORIDE 30; 37; 368; 526; 502 MG/100ML; MG/100ML; MG/100ML; MG/100ML; MG/100ML
INJECTION, SOLUTION INTRAVENOUS CONTINUOUS
Status: CANCELLED | OUTPATIENT
Start: 2024-05-08 | End: 2024-06-07

## 2024-05-08 RX ORDER — HYDROMORPHONE HYDROCHLORIDE 2 MG/ML
0.4 INJECTION, SOLUTION INTRAMUSCULAR; INTRAVENOUS; SUBCUTANEOUS EVERY 5 MIN PRN
Status: DISCONTINUED | OUTPATIENT
Start: 2024-05-08 | End: 2024-05-08

## 2024-05-08 RX ORDER — ONDANSETRON HYDROCHLORIDE 2 MG/ML
INJECTION, SOLUTION INTRAVENOUS
Status: COMPLETED
Start: 2024-05-08 | End: 2024-05-08

## 2024-05-08 RX ORDER — MEPERIDINE HYDROCHLORIDE 25 MG/ML
12.5 INJECTION INTRAMUSCULAR; INTRAVENOUS; SUBCUTANEOUS EVERY 10 MIN PRN
Status: DISCONTINUED | OUTPATIENT
Start: 2024-05-08 | End: 2024-05-08

## 2024-05-08 RX ORDER — ROCURONIUM BROMIDE 10 MG/ML
INJECTION, SOLUTION INTRAVENOUS
Status: DISCONTINUED | OUTPATIENT
Start: 2024-05-08 | End: 2024-05-08

## 2024-05-08 RX ORDER — ALBUMIN HUMAN 250 G/1000ML
SOLUTION INTRAVENOUS
Status: DISCONTINUED | OUTPATIENT
Start: 2024-05-08 | End: 2024-05-08

## 2024-05-08 RX ORDER — PROCHLORPERAZINE EDISYLATE 5 MG/ML
5 INJECTION INTRAMUSCULAR; INTRAVENOUS EVERY 30 MIN PRN
Status: DISCONTINUED | OUTPATIENT
Start: 2024-05-08 | End: 2024-05-08

## 2024-05-08 RX ORDER — SODIUM CHLORIDE, SODIUM LACTATE, POTASSIUM CHLORIDE, CALCIUM CHLORIDE 600; 310; 30; 20 MG/100ML; MG/100ML; MG/100ML; MG/100ML
INJECTION, SOLUTION INTRAVENOUS
Status: COMPLETED | OUTPATIENT
Start: 2024-05-08 | End: 2024-05-08

## 2024-05-08 RX ORDER — IPRATROPIUM BROMIDE AND ALBUTEROL SULFATE 2.5; .5 MG/3ML; MG/3ML
3 SOLUTION RESPIRATORY (INHALATION)
Status: DISCONTINUED | OUTPATIENT
Start: 2024-05-08 | End: 2024-05-08

## 2024-05-08 RX ORDER — CEFAZOLIN SODIUM 1 G/3ML
INJECTION, POWDER, FOR SOLUTION INTRAMUSCULAR; INTRAVENOUS
Status: DISCONTINUED | OUTPATIENT
Start: 2024-05-08 | End: 2024-05-08

## 2024-05-08 RX ORDER — SODIUM CHLORIDE, SODIUM LACTATE, POTASSIUM CHLORIDE, CALCIUM CHLORIDE 600; 310; 30; 20 MG/100ML; MG/100ML; MG/100ML; MG/100ML
INJECTION, SOLUTION INTRAVENOUS CONTINUOUS
Status: DISCONTINUED | OUTPATIENT
Start: 2024-05-08 | End: 2024-05-15

## 2024-05-08 RX ADMIN — PROPOFOL 20 MG: 10 INJECTION, EMULSION INTRAVENOUS at 03:05

## 2024-05-08 RX ADMIN — ENOXAPARIN SODIUM 40 MG: 40 INJECTION SUBCUTANEOUS at 02:05

## 2024-05-08 RX ADMIN — ROCURONIUM BROMIDE 50 MG: 10 SOLUTION INTRAVENOUS at 06:05

## 2024-05-08 RX ADMIN — GABAPENTIN 300 MG: 300 CAPSULE ORAL at 11:05

## 2024-05-08 RX ADMIN — ACETAMINOPHEN 650 MG: 325 TABLET, FILM COATED ORAL at 01:05

## 2024-05-08 RX ADMIN — SODIUM CHLORIDE, POTASSIUM CHLORIDE, SODIUM LACTATE AND CALCIUM CHLORIDE: 600; 310; 30; 20 INJECTION, SOLUTION INTRAVENOUS at 10:05

## 2024-05-08 RX ADMIN — ROCURONIUM BROMIDE 50 MG: 10 SOLUTION INTRAVENOUS at 07:05

## 2024-05-08 RX ADMIN — ALBUMIN (HUMAN) 100 ML: 12.5 SOLUTION INTRAVENOUS at 07:05

## 2024-05-08 RX ADMIN — OXYCODONE HYDROCHLORIDE 10 MG: 10 TABLET ORAL at 01:05

## 2024-05-08 RX ADMIN — ROCURONIUM BROMIDE 50 MG: 10 SOLUTION INTRAVENOUS at 05:05

## 2024-05-08 RX ADMIN — CEFAZOLIN SODIUM 2 G: 2 SOLUTION INTRAVENOUS at 12:05

## 2024-05-08 RX ADMIN — TETANUS TOXOID, REDUCED DIPHTHERIA TOXOID AND ACELLULAR PERTUSSIS VACCINE, ADSORBED 0.5 ML: 5; 2.5; 8; 8; 2.5 SUSPENSION INTRAMUSCULAR at 11:05

## 2024-05-08 RX ADMIN — SODIUM CHLORIDE 1000 ML: 9 INJECTION, SOLUTION INTRAVENOUS at 03:05

## 2024-05-08 RX ADMIN — MIDAZOLAM HYDROCHLORIDE 2 MG: 1 INJECTION, SOLUTION INTRAMUSCULAR; INTRAVENOUS at 05:05

## 2024-05-08 RX ADMIN — LIDOCAINE HYDROCHLORIDE 40 MG: 20 INJECTION, SOLUTION INTRAVENOUS at 05:05

## 2024-05-08 RX ADMIN — ONDANSETRON: 2 INJECTION INTRAMUSCULAR; INTRAVENOUS at 12:05

## 2024-05-08 RX ADMIN — ALBUMIN (HUMAN) 100 ML: 12.5 SOLUTION INTRAVENOUS at 06:05

## 2024-05-08 RX ADMIN — PHENYLEPHRINE HYDROCHLORIDE 200 MCG: 10 INJECTION INTRAVENOUS at 06:05

## 2024-05-08 RX ADMIN — ACETAMINOPHEN 1000 MG: 10 INJECTION, SOLUTION INTRAVENOUS at 06:05

## 2024-05-08 RX ADMIN — ENOXAPARIN SODIUM 40 MG: 40 INJECTION SUBCUTANEOUS at 11:05

## 2024-05-08 RX ADMIN — SODIUM CHLORIDE, POTASSIUM CHLORIDE, SODIUM LACTATE AND CALCIUM CHLORIDE 999 ML/HR: 600; 310; 30; 20 INJECTION, SOLUTION INTRAVENOUS at 11:05

## 2024-05-08 RX ADMIN — SODIUM CHLORIDE, SODIUM GLUCONATE, SODIUM ACETATE, POTASSIUM CHLORIDE AND MAGNESIUM CHLORIDE: 526; 502; 368; 37; 30 INJECTION, SOLUTION INTRAVENOUS at 07:05

## 2024-05-08 RX ADMIN — POLYETHYLENE GLYCOL 3350 17 G: 17 POWDER, FOR SOLUTION ORAL at 02:05

## 2024-05-08 RX ADMIN — PROPOFOL 50 MG: 10 INJECTION, EMULSION INTRAVENOUS at 03:05

## 2024-05-08 RX ADMIN — ACETAMINOPHEN 650 MG: 325 TABLET, FILM COATED ORAL at 11:05

## 2024-05-08 RX ADMIN — SODIUM CHLORIDE, SODIUM GLUCONATE, SODIUM ACETATE, POTASSIUM CHLORIDE AND MAGNESIUM CHLORIDE: 526; 502; 368; 37; 30 INJECTION, SOLUTION INTRAVENOUS at 05:05

## 2024-05-08 RX ADMIN — PROPOFOL 20 MG: 10 INJECTION, EMULSION INTRAVENOUS at 04:05

## 2024-05-08 RX ADMIN — DEXAMETHASONE SODIUM PHOSPHATE 4 MG: 4 INJECTION, SOLUTION INTRA-ARTICULAR; INTRALESIONAL; INTRAMUSCULAR; INTRAVENOUS; SOFT TISSUE at 06:05

## 2024-05-08 RX ADMIN — GABAPENTIN 300 MG: 300 CAPSULE ORAL at 02:05

## 2024-05-08 RX ADMIN — SODIUM CHLORIDE, POTASSIUM CHLORIDE, SODIUM LACTATE AND CALCIUM CHLORIDE: 600; 310; 30; 20 INJECTION, SOLUTION INTRAVENOUS at 02:05

## 2024-05-08 RX ADMIN — CEFAZOLIN 2 G: 330 INJECTION, POWDER, FOR SOLUTION INTRAMUSCULAR; INTRAVENOUS at 05:05

## 2024-05-08 RX ADMIN — DOCUSATE SODIUM 100 MG: 100 CAPSULE, LIQUID FILLED ORAL at 02:05

## 2024-05-08 RX ADMIN — IOHEXOL 110 ML: 350 INJECTION, SOLUTION INTRAVENOUS at 12:05

## 2024-05-08 RX ADMIN — FENTANYL CITRATE 25 MCG: 50 INJECTION, SOLUTION INTRAMUSCULAR; INTRAVENOUS at 06:05

## 2024-05-08 RX ADMIN — PHENYLEPHRINE HYDROCHLORIDE 150 MCG: 10 INJECTION INTRAVENOUS at 05:05

## 2024-05-08 RX ADMIN — PROPOFOL 180 MG: 10 INJECTION, EMULSION INTRAVENOUS at 05:05

## 2024-05-08 RX ADMIN — FENTANYL CITRATE 25 MCG: 50 INJECTION, SOLUTION INTRAMUSCULAR; INTRAVENOUS at 07:05

## 2024-05-08 RX ADMIN — ONDANSETRON 4 MG: 2 INJECTION INTRAMUSCULAR; INTRAVENOUS at 06:05

## 2024-05-08 RX ADMIN — FENTANYL CITRATE 100 MCG: 50 INJECTION, SOLUTION INTRAMUSCULAR; INTRAVENOUS at 05:05

## 2024-05-08 RX ADMIN — METHOCARBAMOL 500 MG: 500 TABLET ORAL at 01:05

## 2024-05-08 RX ADMIN — ONDANSETRON 8 MG: 2 INJECTION INTRAMUSCULAR; INTRAVENOUS at 12:05

## 2024-05-08 RX ADMIN — CEFAZOLIN: 330 INJECTION, POWDER, FOR SOLUTION INTRAMUSCULAR; INTRAVENOUS at 12:05

## 2024-05-08 RX ADMIN — SUGAMMADEX 300 MG: 100 INJECTION, SOLUTION INTRAVENOUS at 09:05

## 2024-05-08 RX ADMIN — METHOCARBAMOL 500 MG: 500 TABLET ORAL at 11:05

## 2024-05-08 RX ADMIN — FENTANYL CITRATE 50 MCG: 50 INJECTION, SOLUTION INTRAMUSCULAR; INTRAVENOUS at 09:05
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
## 2024-05-08 NOTE — ANESTHESIA PREPROCEDURE EVALUATION
05/08/2024  Venkata Douglas is a 24 y.o., male.    Pre-op Diagnosis: Type I or II open fracture of right ankle, initial encounter [S82.892D]  Closed intracapsular fracture of right femur, initial encounter [S72.011A]    Procedure(s):  ORIF, ANKLE  ORIF, FRACTURE, FEMUR  INCISION AND DRAINAGE, LOWER EXTREMITY     Assessment/Plan:      * Unspecified fracture of right acetabulum, initial encounter for closed fracture  Nonweightbearing.  Follow up Orthopedics.  3D recon pelvis.  Lovenox b.i.d..  Multimodal pain control.     Closed fracture of one rib of left side  Multimodal pain control.     Pulmonary contusion  Out of bed once able.  PT and OT once able.  Aggressive incentive spirometry.  Wean oxygen as able.  Maintain saturations 92% or higher.     Lactic acidosis  1 L IV fluid bolus given.  Continue trending until normal.     DONAL (acute kidney injury)  Aggressive IV fluids.  Recheck in the morning.  Diet as soon as able.     Closed fracture of pubic ramus  Nonweightbearing bilateral lower extremities until cleared by Orthopedics.  Does not meet criteria for abdominal binder.     Other fracture of right femur, initial encounter for closed fracture  As above     Closed fracture of right patella  Nonweightbearing.  Multimodal pain control.  3D recon need.  Follow up Orthopedics.     Closed fracture of right tibial plateau  As above.     Open right ankle fracture  Ancef QA until cleared by Orthopedics.  Nonweightbearing.  Multimodal pain control.  Follow up Orthopedics final recommendation.     Closed fracture of right foot  Lovenox b.i.d..  Nonweightbearing until cleared by Orthopedics.  Follow up orthopedic recommendations.  CT recon foot.     Distal radius fracture, right  We will be reduced by ER.  Splint will be applied.  Multimodal pain control.  Nonweightbearing.  Follow up Orthopedics final  recommendation.     Fracture of right ulna  As above  Recent Labs   Lab 05/08/24  1208   WBC 30.36  30.36*   RBC 5.06   HGB 15.9   HCT 45.9   MCV 90.7   MCH 31.4*   MCHC 34.6   RDW 11.9      MPV 10.1       Recent Labs   Lab 05/08/24  1208      K 4.7   CO2 23   BUN 15.2   CREATININE 1.39*   CALCIUM 9.5   ALBUMIN 4.6   ALKPHOS 73   *   *   BILITOT 0.7       Recent Labs   Lab 05/08/24  1208   PTT 28.8   INR 1.1       CT CHEST, ABD, PELVIS    FINDINGS:  The lungs are adequately aerated.  No pneumothorax is seen.  There is a mild ground-glass opacity in the superior segment the right lower lobe medially which could represent mild pulmonary contusion.  No pleural effusion is seen.  No infiltrate is seen.     The thoracic aorta is normal in caliber.  No dissection or aneurysm is seen.  No retrosternal hematoma is seen.     The abdominal aorta appears grossly unremarkable.  No dissection or posttraumatic changes are seen.     The heart appears normal.     The liver appears normal.  No liver mass or lesion is seen.  No evidence of liver laceration is seen.     The gallbladder appears normal.  No gallstones are seen..     The spleen appears normal.  No splenic laceration is seen.  The pancreas appears grossly unremarkable.  No pancreatic mass or lesion is seen.  No inflammation is seen.     No adrenal abnormality is seen.  No adrenal nodule is seen.     The kidneys are well perfused.  There are linear defects seen within the right kidney in the midpole on image 115 series 4 and in the lower pole on images 119 through 123 series 4.  .  No perinephric hematoma is seen however.  No subcapsular hematoma is seen.  No stranding or inflammatory changes are seen.  Findings could represent irregular areas of parenchymal septation but given the patient's history areas of small these renal laceration cannot be completely excluded however seems less likely given the lack of inflammatory change, hematoma or  stranding around the right kidney or the retroperitoneum.     Visualized portions of the bowel shows no acute abnormality.  No colitis is seen.  No diverticulitis is seen.  No colonic mass is seen.  The appendix appears normal.     There appear to be some varicosities in the scrotal sac on the right and left side.     No free air is seen.  No free fluid is seen.     Urinary bladder appears unremarkable.     No sternal fracture is seen.  No thoracic spine fracture is seen.  No lumbar spine fracture is seen.  There is a fracture of the superior pubic ramus and inferior pubic ramus on the right side at the level of the pubis.  It is nondisplaced.  There is a nondisplaced fracture at the roof of the of the acetabulum on the right side.  This is best seen on images number 39 through 41 series 6.  There is a nondisplaced fracture of the left 7th rib posteriorly.     Impression:     Nondisplaced fracture of the superior and inferior pubic ramus on the right side at the level of the pubis symphysis.     Nondisplaced fracture of the acetabulum the right side     Mild ground-glass opacity in the superior segment of the right lower lobe medially which could represent a small area of pulmonary contusion     Nondisplaced left 7th rib fracture posteriorly     There are areas of linear hypoattenuation in the right kidney in the mid and lower pole.  There is no associated perinephric stranding.  No inflammatory changes are seen.  No subcapsular or retroperitoneal hematoma is seen.  These areas of linear hypoattenuation may represent junctional cortical defects but given the patient's history renal injury cannot be completely excluded.  Follow-up is recommended.        Electronically signed by:Los Medina  Date:                                            05/08/2024  Time:                                           13:08    Review of patient's allergies indicates:  No Known Allergies    No current outpatient medications        No  past medical history on file.    No past surgical history on file.    Pre-op Assessment    I have reviewed the Patient Summary Reports.    I have reviewed the NPO Status.   I have reviewed the Medications.     Review of Systems  Anesthesia Hx:  No problems with previous Anesthesia             Denies Family Hx of Anesthesia complications.    Denies Personal Hx of Anesthesia complications.                    Social:  Non-Smoker       Cardiovascular:  Exercise tolerance: good          Denies Angina.   Denies Orthopnea.  Denies PND.    Denies THOMASON.    Functional Capacity good / => 4 METS                         Musculoskeletal:  Musculoskeletal Normal                Neurological:  Denies TIA.  Denies CVA.                                    Psych:  Psychiatric Normal                    Physical Exam  General: Well nourished, Alert and Oriented    Airway:  Mallampati: III   Mouth Opening: Normal  TM Distance: Normal  Tongue: Normal  Neck ROM: Normal ROM    Dental:  Intact    Chest/Lungs:  Clear to auscultation    Heart:  Rate: Normal  Rhythm: Regular Rhythm  No pretibial edema  No carotid bruits      Anesthesia Plan  Type of Anesthesia, risks & benefits discussed:    Anesthesia Type: Gen ETT  Intra-op Monitoring Plan: Standard ASA Monitors  Post Op Pain Control Plan: multimodal analgesia  Induction:  IV and rapid sequence  Airway Plan: Direct, Post-Induction  Informed Consent: Informed consent signed with the Patient and all parties understand the risks and agree with anesthesia plan.  All questions answered. Patient consented to blood products? Yes  ASA Score: 2 Emergent  Day of Surgery Review of History & Physical: H&P Update referred to the surgeon/provider.    Ready For Surgery From Anesthesia Perspective.     .

## 2024-05-08 NOTE — ASSESSMENT & PLAN NOTE
Ancef QA until cleared by Orthopedics.  Nonweightbearing.  Multimodal pain control.  Follow up Orthopedics final recommendation.

## 2024-05-08 NOTE — ANESTHESIA PROCEDURE NOTES
Intubation    Date/Time: 5/8/2024 5:58 PM    Performed by: Solis Wilson CRNA  Authorized by: Bharathi Salazar MD    Intubation:     Induction:  Rapid sequence induction    Intubated:  Postinduction    Mask Ventilation:  Not attempted    Attempts:  1    Attempted By:  CRNA    Method of Intubation:  Direct    Blade:  Mckenzie 2    Laryngeal View Grade: Grade I - full view of cords      Difficult Airway Encountered?: No      Complications:  None    Airway Device:  Oral endotracheal tube    Airway Device Size:  7.5    Inflation Amount (mL):  6    Tube secured:  23    Secured at:  The lips    Placement Verified By:  Capnometry    Complicating Factors:  None    Findings Post-Intubation:  BS equal bilateral

## 2024-05-08 NOTE — ASSESSMENT & PLAN NOTE
We will be reduced by ER.  Splint will be applied.  Multimodal pain control.  Nonweightbearing.  Follow up Orthopedics final recommendation.

## 2024-05-08 NOTE — SUBJECTIVE & OBJECTIVE
No current facility-administered medications on file prior to encounter.     No current outpatient medications on file prior to encounter.       Review of patient's allergies indicates:  No Known Allergies    No past medical history on file.  No past surgical history on file.  Family History    None       Tobacco Use    Smoking status: Not on file    Smokeless tobacco: Not on file   Substance and Sexual Activity    Alcohol use: Not on file    Drug use: Not on file    Sexual activity: Not on file     Review of Systems   Constitutional:  Negative for chills and fever.   HENT:  Negative for ear pain and trouble swallowing.    Eyes:  Negative for pain and redness.   Respiratory:  Negative for cough and chest tightness.    Cardiovascular:  Negative for chest pain, palpitations and leg swelling.   Gastrointestinal:  Negative for abdominal distention, abdominal pain, nausea and vomiting.   Genitourinary:  Negative for difficulty urinating.   Musculoskeletal:  Positive for arthralgias, joint swelling and myalgias. Negative for back pain and neck pain.   Skin:  Positive for wound. Negative for color change and pallor.   Neurological:  Negative for dizziness, syncope, speech difficulty, weakness, light-headedness, numbness and headaches.   Psychiatric/Behavioral:  Negative for agitation and suicidal ideas.    All other systems reviewed and are negative.    Objective:     Vital Signs (Most Recent):  Temp: 98.6 °F (37 °C) (05/08/24 1230)  Pulse: 100 (05/08/24 1611)  Resp: (!) 21 (05/08/24 1611)  BP: 131/73 (05/08/24 1611)  SpO2: 100 % (05/08/24 1611) Vital Signs (24h Range):  Temp:  [98.6 °F (37 °C)] 98.6 °F (37 °C)  Pulse:  [] 100  Resp:  [15-25] 21  SpO2:  [90 %-100 %] 100 %  BP: (100-143)/(60-86) 131/73     Weight: 63.5 kg (140 lb)  Body mass index is 24.8 kg/m².     Physical Exam  Constitutional:       Appearance: Normal appearance.   HENT:      Head: Normocephalic.      Comments: Abrasions over the face.     Nose:  Nose normal.   Eyes:      Pupils: Pupils are equal, round, and reactive to light.   Cardiovascular:      Rate and Rhythm: Normal rate.      Pulses: Normal pulses.      Comments: Normal peripheral pulses  Pulmonary:      Effort: Pulmonary effort is normal. No respiratory distress.   Chest:      Chest wall: Tenderness present.   Abdominal:      General: Abdomen is flat. Bowel sounds are normal. There is no distension.      Palpations: Abdomen is soft.      Tenderness: There is no abdominal tenderness.   Musculoskeletal:         General: Swelling, tenderness, deformity and signs of injury present.      Cervical back: Normal range of motion and neck supple. No tenderness.   Skin:     General: Skin is warm and dry.      Capillary Refill: Capillary refill takes less than 2 seconds.      Findings: No lesion.      Comments: Multiple abrasions over his entire body.  No discrete lacerations requiring repair.   Neurological:      Mental Status: He is alert and oriented to person, place, and time. Mental status is at baseline.   Psychiatric:         Mood and Affect: Mood normal.         Behavior: Behavior normal.         Thought Content: Thought content normal.         Judgment: Judgment normal.            I have reviewed all pertinent lab results within the past 24 hours.    Significant Diagnostics:  I have reviewed all pertinent imaging results/findings within the past 24 hours.

## 2024-05-08 NOTE — ASSESSMENT & PLAN NOTE
Nonweightbearing.  Follow up Orthopedics.  3D recon pelvis.  Lovenox b.i.d..  Multimodal pain control.

## 2024-05-08 NOTE — ASSESSMENT & PLAN NOTE
Nonweightbearing bilateral lower extremities until cleared by Orthopedics.  Does not meet criteria for abdominal binder.
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
## 2024-05-08 NOTE — ASSESSMENT & PLAN NOTE
Out of bed once able.  PT and OT once able.  Aggressive incentive spirometry.  Wean oxygen as able.  Maintain saturations 92% or higher.

## 2024-05-08 NOTE — ASSESSMENT & PLAN NOTE
Lovenox b.i.d..  Nonweightbearing until cleared by Orthopedics.  Follow up orthopedic recommendations.  CT recon foot.
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
## 2024-05-09 ENCOUNTER — ANESTHESIA (OUTPATIENT)
Dept: SURGERY | Facility: HOSPITAL | Age: 24
DRG: 956 | End: 2024-05-09
Payer: MEDICAID

## 2024-05-09 LAB
ALBUMIN SERPL-MCNC: 4 G/DL (ref 3.5–5)
ALBUMIN/GLOB SERPL: 2.2 RATIO (ref 1.1–2)
ALP SERPL-CCNC: 35 UNIT/L (ref 40–150)
ALT SERPL-CCNC: 44 UNIT/L (ref 0–55)
AMPHET UR QL SCN: NEGATIVE
AST SERPL-CCNC: 73 UNIT/L (ref 5–34)
BACTERIA #/AREA URNS AUTO: ABNORMAL /HPF
BARBITURATE SCN PRESENT UR: NEGATIVE
BASOPHILS # BLD AUTO: 0.02 X10(3)/MCL
BASOPHILS # BLD AUTO: 0.02 X10(3)/MCL
BASOPHILS NFR BLD AUTO: 0.1 %
BASOPHILS NFR BLD AUTO: 0.2 %
BENZODIAZ UR QL SCN: POSITIVE
BILIRUB SERPL-MCNC: 0.9 MG/DL
BILIRUB UR QL STRIP.AUTO: NEGATIVE
BUN SERPL-MCNC: 10.3 MG/DL (ref 8.9–20.6)
CALCIUM SERPL-MCNC: 8 MG/DL (ref 8.4–10.2)
CANNABINOIDS UR QL SCN: NEGATIVE
CHLORIDE SERPL-SCNC: 107 MMOL/L (ref 98–107)
CK SERPL-CCNC: 5481 U/L (ref 30–200)
CK SERPL-CCNC: 5586 U/L (ref 30–200)
CK SERPL-CCNC: 7653 U/L (ref 30–200)
CLARITY UR: CLEAR
CO2 SERPL-SCNC: 27 MMOL/L (ref 22–29)
COCAINE UR QL SCN: NEGATIVE
COLOR UR AUTO: COLORLESS
CREAT SERPL-MCNC: 0.82 MG/DL (ref 0.73–1.18)
CRP SERPL-MCNC: 89.4 MG/L
EOSINOPHIL # BLD AUTO: 0 X10(3)/MCL (ref 0–0.9)
EOSINOPHIL # BLD AUTO: 0 X10(3)/MCL (ref 0–0.9)
EOSINOPHIL NFR BLD AUTO: 0 %
EOSINOPHIL NFR BLD AUTO: 0 %
ERYTHROCYTE [DISTWIDTH] IN BLOOD BY AUTOMATED COUNT: 11.8 % (ref 11.5–17)
ERYTHROCYTE [DISTWIDTH] IN BLOOD BY AUTOMATED COUNT: 11.9 % (ref 11.5–17)
FENTANYL UR QL SCN: POSITIVE
GFR SERPLBLD CREATININE-BSD FMLA CKD-EPI: >60 ML/MIN/1.73/M2
GLOBULIN SER-MCNC: 1.8 GM/DL (ref 2.4–3.5)
GLUCOSE SERPL-MCNC: 122 MG/DL (ref 74–100)
GLUCOSE UR QL STRIP: NORMAL
HCT VFR BLD AUTO: 25.1 % (ref 42–52)
HCT VFR BLD AUTO: 27.7 % (ref 42–52)
HGB BLD-MCNC: 8.3 G/DL (ref 14–18)
HGB BLD-MCNC: 8.5 G/DL (ref 14–18)
HGB UR QL STRIP: ABNORMAL
IMM GRANULOCYTES # BLD AUTO: 0.05 X10(3)/MCL (ref 0–0.04)
IMM GRANULOCYTES # BLD AUTO: 0.07 X10(3)/MCL (ref 0–0.04)
IMM GRANULOCYTES NFR BLD AUTO: 0.5 %
IMM GRANULOCYTES NFR BLD AUTO: 0.5 %
KETONES UR QL STRIP: NEGATIVE
LACTATE SERPL-SCNC: 1.2 MMOL/L (ref 0.5–2.2)
LACTATE SERPL-SCNC: 4.2 MMOL/L (ref 0.5–2.2)
LEUKOCYTE ESTERASE UR QL STRIP: NEGATIVE
LYMPHOCYTES # BLD AUTO: 0.64 X10(3)/MCL (ref 0.6–4.6)
LYMPHOCYTES # BLD AUTO: 0.94 X10(3)/MCL (ref 0.6–4.6)
LYMPHOCYTES NFR BLD AUTO: 4.5 %
LYMPHOCYTES NFR BLD AUTO: 8.5 %
MAGNESIUM SERPL-MCNC: 2.1 MG/DL (ref 1.6–2.6)
MCH RBC QN AUTO: 30.6 PG (ref 27–31)
MCH RBC QN AUTO: 31.1 PG (ref 27–31)
MCHC RBC AUTO-ENTMCNC: 30 G/DL (ref 33–36)
MCHC RBC AUTO-ENTMCNC: 33.9 G/DL (ref 33–36)
MCV RBC AUTO: 102.2 FL (ref 80–94)
MCV RBC AUTO: 91.9 FL (ref 80–94)
MDMA UR QL SCN: NEGATIVE
MONOCYTES # BLD AUTO: 0.85 X10(3)/MCL (ref 0.1–1.3)
MONOCYTES # BLD AUTO: 0.91 X10(3)/MCL (ref 0.1–1.3)
MONOCYTES NFR BLD AUTO: 6.4 %
MONOCYTES NFR BLD AUTO: 7.7 %
NEUTROPHILS # BLD AUTO: 12.48 X10(3)/MCL (ref 2.1–9.2)
NEUTROPHILS # BLD AUTO: 9.16 X10(3)/MCL (ref 2.1–9.2)
NEUTROPHILS NFR BLD AUTO: 83.1 %
NEUTROPHILS NFR BLD AUTO: 88.5 %
NITRITE UR QL STRIP: NEGATIVE
NRBC BLD AUTO-RTO: 0 %
NRBC BLD AUTO-RTO: 0 %
OPIATES UR QL SCN: NEGATIVE
PCP UR QL: NEGATIVE
PH UR STRIP: 6.5 [PH]
PH UR: 6.5 [PH] (ref 3–11)
PHOSPHATE SERPL-MCNC: 3 MG/DL (ref 2.3–4.7)
PLATELET # BLD AUTO: 187 X10(3)/MCL (ref 130–400)
PLATELET # BLD AUTO: 190 X10(3)/MCL (ref 130–400)
PMV BLD AUTO: 9.2 FL (ref 7.4–10.4)
PMV BLD AUTO: 9.7 FL (ref 7.4–10.4)
POTASSIUM SERPL-SCNC: 3.8 MMOL/L (ref 3.5–5.1)
PREALB SERPL-MCNC: 19.6 MG/DL (ref 18–45)
PROT SERPL-MCNC: 5.8 GM/DL (ref 6.4–8.3)
PROT UR QL STRIP: NEGATIVE
RBC # BLD AUTO: 2.71 X10(6)/MCL (ref 4.7–6.1)
RBC # BLD AUTO: 2.73 X10(6)/MCL (ref 4.7–6.1)
RBC #/AREA URNS AUTO: ABNORMAL /HPF
SODIUM SERPL-SCNC: 138 MMOL/L (ref 136–145)
SP GR UR STRIP.AUTO: 1.01 (ref 1–1.03)
SPECIFIC GRAVITY, URINE AUTO (.000) (OHS): 1.01 (ref 1–1.03)
SQUAMOUS #/AREA URNS LPF: ABNORMAL /HPF
UROBILINOGEN UR STRIP-ACNC: NORMAL
WBC # SPEC AUTO: 11.02 X10(3)/MCL (ref 4.5–11.5)
WBC # SPEC AUTO: 14.12 X10(3)/MCL (ref 4.5–11.5)
WBC #/AREA URNS AUTO: ABNORMAL /HPF

## 2024-05-09 PROCEDURE — C1769 GUIDE WIRE: HCPCS | Performed by: REHABILITATION UNIT

## 2024-05-09 PROCEDURE — 27220 TREAT HIP SOCKET FRACTURE: CPT | Mod: 58,51,RT, | Performed by: REHABILITATION UNIT

## 2024-05-09 PROCEDURE — 25000003 PHARM REV CODE 250

## 2024-05-09 PROCEDURE — 63600175 PHARM REV CODE 636 W HCPCS: Performed by: REHABILITATION UNIT

## 2024-05-09 PROCEDURE — 80053 COMPREHEN METABOLIC PANEL: CPT | Performed by: REHABILITATION UNIT

## 2024-05-09 PROCEDURE — 27506 TREATMENT OF THIGH FRACTURE: CPT | Mod: 58,RT,, | Performed by: REHABILITATION UNIT

## 2024-05-09 PROCEDURE — 25000003 PHARM REV CODE 250: Performed by: NURSE ANESTHETIST, CERTIFIED REGISTERED

## 2024-05-09 PROCEDURE — 85025 COMPLETE CBC W/AUTO DIFF WBC: CPT | Performed by: REHABILITATION UNIT

## 2024-05-09 PROCEDURE — 63600175 PHARM REV CODE 636 W HCPCS

## 2024-05-09 PROCEDURE — 63600175 PHARM REV CODE 636 W HCPCS: Performed by: NURSE ANESTHETIST, CERTIFIED REGISTERED

## 2024-05-09 PROCEDURE — 36415 COLL VENOUS BLD VENIPUNCTURE: CPT | Performed by: REHABILITATION UNIT

## 2024-05-09 PROCEDURE — D9220A PRA ANESTHESIA: Mod: CRNA,,,

## 2024-05-09 PROCEDURE — D9220A PRA ANESTHESIA: Mod: AD,ANES,, | Performed by: ANESTHESIOLOGY

## 2024-05-09 PROCEDURE — 86140 C-REACTIVE PROTEIN: CPT | Performed by: REHABILITATION UNIT

## 2024-05-09 PROCEDURE — 27197 CLSD TX PELVIC RING FX: CPT | Mod: 58,51,RT, | Performed by: REHABILITATION UNIT

## 2024-05-09 PROCEDURE — 25609 OPTX DST RD XART FX/EP SEP3+: CPT | Mod: 58,51,RT, | Performed by: REHABILITATION UNIT

## 2024-05-09 PROCEDURE — 84134 ASSAY OF PREALBUMIN: CPT | Performed by: REHABILITATION UNIT

## 2024-05-09 PROCEDURE — 81001 URINALYSIS AUTO W/SCOPE: CPT | Mod: XB | Performed by: REHABILITATION UNIT

## 2024-05-09 PROCEDURE — 11000001 HC ACUTE MED/SURG PRIVATE ROOM

## 2024-05-09 PROCEDURE — 85025 COMPLETE CBC W/AUTO DIFF WBC: CPT | Performed by: NURSE PRACTITIONER

## 2024-05-09 PROCEDURE — 25000003 PHARM REV CODE 250: Performed by: REHABILITATION UNIT

## 2024-05-09 PROCEDURE — 63600175 PHARM REV CODE 636 W HCPCS: Performed by: ANESTHESIOLOGY

## 2024-05-09 PROCEDURE — 83605 ASSAY OF LACTIC ACID: CPT | Performed by: NURSE PRACTITIONER

## 2024-05-09 PROCEDURE — 80307 DRUG TEST PRSMV CHEM ANLYZR: CPT | Performed by: REHABILITATION UNIT

## 2024-05-09 PROCEDURE — C1713 ANCHOR/SCREW BN/BN,TIS/BN: HCPCS | Performed by: REHABILITATION UNIT

## 2024-05-09 PROCEDURE — 63600175 PHARM REV CODE 636 W HCPCS: Performed by: NURSE PRACTITIONER

## 2024-05-09 PROCEDURE — 36000710: Performed by: REHABILITATION UNIT

## 2024-05-09 PROCEDURE — 25000003 PHARM REV CODE 250: Performed by: STUDENT IN AN ORGANIZED HEALTH CARE EDUCATION/TRAINING PROGRAM

## 2024-05-09 PROCEDURE — 82550 ASSAY OF CK (CPK): CPT | Performed by: REHABILITATION UNIT

## 2024-05-09 PROCEDURE — 82550 ASSAY OF CK (CPK): CPT | Performed by: NURSE PRACTITIONER

## 2024-05-09 PROCEDURE — 36415 COLL VENOUS BLD VENIPUNCTURE: CPT | Performed by: NURSE PRACTITIONER

## 2024-05-09 PROCEDURE — 0QS836Z REPOSITION RIGHT FEMORAL SHAFT WITH INTRAMEDULLARY INTERNAL FIXATION DEVICE, PERCUTANEOUS APPROACH: ICD-10-PCS | Performed by: REHABILITATION UNIT

## 2024-05-09 PROCEDURE — 27520 TREAT KNEECAP FRACTURE: CPT | Mod: 58,51,RT, | Performed by: REHABILITATION UNIT

## 2024-05-09 PROCEDURE — 37000009 HC ANESTHESIA EA ADD 15 MINS: Performed by: REHABILITATION UNIT

## 2024-05-09 PROCEDURE — 27201423 OPTIME MED/SURG SUP & DEVICES STERILE SUPPLY: Performed by: REHABILITATION UNIT

## 2024-05-09 PROCEDURE — 0PSH04Z REPOSITION RIGHT RADIUS WITH INTERNAL FIXATION DEVICE, OPEN APPROACH: ICD-10-PCS | Performed by: REHABILITATION UNIT

## 2024-05-09 PROCEDURE — 84100 ASSAY OF PHOSPHORUS: CPT | Performed by: REHABILITATION UNIT

## 2024-05-09 PROCEDURE — 71000033 HC RECOVERY, INTIAL HOUR: Performed by: REHABILITATION UNIT

## 2024-05-09 PROCEDURE — 37000008 HC ANESTHESIA 1ST 15 MINUTES: Performed by: REHABILITATION UNIT

## 2024-05-09 PROCEDURE — 36000711: Performed by: REHABILITATION UNIT

## 2024-05-09 PROCEDURE — 83735 ASSAY OF MAGNESIUM: CPT | Performed by: REHABILITATION UNIT

## 2024-05-09 DEVICE — PLATE DVR CROSSLOCK R 24X51MM: Type: IMPLANTABLE DEVICE | Site: RADIUS | Status: FUNCTIONAL

## 2024-05-09 DEVICE — SCREW ALPS LP N LOK 2.7X16MM: Type: IMPLANTABLE DEVICE | Site: RADIUS | Status: FUNCTIONAL

## 2024-05-09 DEVICE — LOCKING SCREW FOR IM NAIL Ø 5MM/ 80MM/ XL25/ STERILE: Type: IMPLANTABLE DEVICE | Site: FEMUR | Status: FUNCTIONAL

## 2024-05-09 DEVICE — SCREW SQUARE LOK TI 2.7X22MM: Type: IMPLANTABLE DEVICE | Site: RADIUS | Status: FUNCTIONAL

## 2024-05-09 DEVICE — SCREW SQUARE LOK TI 2.7X20MM: Type: IMPLANTABLE DEVICE | Site: RADIUS | Status: FUNCTIONAL

## 2024-05-09 DEVICE — LOCKING SCREW FOR IM NAIL Ø 5MM/ 60MM/ XL25/ STERILE: Type: IMPLANTABLE DEVICE | Site: FEMUR | Status: FUNCTIONAL

## 2024-05-09 DEVICE — LOCKING SCREW FOR IM NAIL Ø 5MM/ 32MM/ XL25/ STERILE: Type: IMPLANTABLE DEVICE | Site: FEMUR | Status: FUNCTIONAL

## 2024-05-09 DEVICE — SCREW SQUARE LOK TI 2.7X18MM: Type: IMPLANTABLE DEVICE | Site: RADIUS | Status: FUNCTIONAL

## 2024-05-09 DEVICE — IMPLANTABLE DEVICE: Type: IMPLANTABLE DEVICE | Site: FEMUR | Status: FUNCTIONAL

## 2024-05-09 RX ORDER — ROCURONIUM BROMIDE 10 MG/ML
INJECTION, SOLUTION INTRAVENOUS
Status: DISCONTINUED | OUTPATIENT
Start: 2024-05-09 | End: 2024-05-09

## 2024-05-09 RX ORDER — DEXAMETHASONE SODIUM PHOSPHATE 4 MG/ML
INJECTION, SOLUTION INTRA-ARTICULAR; INTRALESIONAL; INTRAMUSCULAR; INTRAVENOUS; SOFT TISSUE
Status: DISCONTINUED | OUTPATIENT
Start: 2024-05-09 | End: 2024-05-09

## 2024-05-09 RX ORDER — FENTANYL CITRATE 50 UG/ML
INJECTION, SOLUTION INTRAMUSCULAR; INTRAVENOUS
Status: DISCONTINUED | OUTPATIENT
Start: 2024-05-09 | End: 2024-05-09

## 2024-05-09 RX ORDER — PROPOFOL 10 MG/ML
VIAL (ML) INTRAVENOUS
Status: DISCONTINUED | OUTPATIENT
Start: 2024-05-09 | End: 2024-05-09

## 2024-05-09 RX ORDER — MIDAZOLAM HYDROCHLORIDE 1 MG/ML
INJECTION INTRAMUSCULAR; INTRAVENOUS
Status: DISCONTINUED | OUTPATIENT
Start: 2024-05-09 | End: 2024-05-09

## 2024-05-09 RX ORDER — HYDROMORPHONE HYDROCHLORIDE 2 MG/ML
INJECTION, SOLUTION INTRAMUSCULAR; INTRAVENOUS; SUBCUTANEOUS
Status: DISCONTINUED | OUTPATIENT
Start: 2024-05-09 | End: 2024-05-09

## 2024-05-09 RX ORDER — FAMOTIDINE 20 MG/1
20 TABLET, FILM COATED ORAL 2 TIMES DAILY
Status: DISCONTINUED | OUTPATIENT
Start: 2024-05-09 | End: 2024-05-09

## 2024-05-09 RX ORDER — PHENYLEPHRINE HCL IN 0.9% NACL 1 MG/10 ML
SYRINGE (ML) INTRAVENOUS
Status: DISCONTINUED | OUTPATIENT
Start: 2024-05-09 | End: 2024-05-09

## 2024-05-09 RX ORDER — DEXMEDETOMIDINE HYDROCHLORIDE 100 UG/ML
INJECTION, SOLUTION INTRAVENOUS
Status: DISCONTINUED | OUTPATIENT
Start: 2024-05-09 | End: 2024-05-09

## 2024-05-09 RX ORDER — SODIUM CHLORIDE 0.9 % (FLUSH) 0.9 %
10 SYRINGE (ML) INJECTION
Status: DISCONTINUED | OUTPATIENT
Start: 2024-05-09 | End: 2024-05-09

## 2024-05-09 RX ORDER — ONDANSETRON HYDROCHLORIDE 2 MG/ML
INJECTION, SOLUTION INTRAVENOUS
Status: DISCONTINUED | OUTPATIENT
Start: 2024-05-09 | End: 2024-05-09

## 2024-05-09 RX ORDER — CEFAZOLIN SODIUM 1 G/3ML
INJECTION, POWDER, FOR SOLUTION INTRAMUSCULAR; INTRAVENOUS
Status: DISCONTINUED | OUTPATIENT
Start: 2024-05-09 | End: 2024-05-09

## 2024-05-09 RX ORDER — HYDROMORPHONE HYDROCHLORIDE 2 MG/ML
0.4 INJECTION, SOLUTION INTRAMUSCULAR; INTRAVENOUS; SUBCUTANEOUS EVERY 5 MIN PRN
Status: DISCONTINUED | OUTPATIENT
Start: 2024-05-09 | End: 2024-05-09

## 2024-05-09 RX ORDER — LIDOCAINE HYDROCHLORIDE 20 MG/ML
INJECTION INTRAVENOUS
Status: DISCONTINUED | OUTPATIENT
Start: 2024-05-09 | End: 2024-05-09

## 2024-05-09 RX ADMIN — CEFAZOLIN SODIUM 2 G: 2 SOLUTION INTRAVENOUS at 10:05

## 2024-05-09 RX ADMIN — GABAPENTIN 300 MG: 300 CAPSULE ORAL at 09:05

## 2024-05-09 RX ADMIN — ENOXAPARIN SODIUM 40 MG: 40 INJECTION SUBCUTANEOUS at 09:05

## 2024-05-09 RX ADMIN — METHOCARBAMOL 500 MG: 500 TABLET ORAL at 10:05

## 2024-05-09 RX ADMIN — PROPOFOL 180 MG: 10 INJECTION, EMULSION INTRAVENOUS at 02:05

## 2024-05-09 RX ADMIN — SODIUM CHLORIDE, SODIUM GLUCONATE, SODIUM ACETATE, POTASSIUM CHLORIDE AND MAGNESIUM CHLORIDE: 526; 502; 368; 37; 30 INJECTION, SOLUTION INTRAVENOUS at 04:05

## 2024-05-09 RX ADMIN — ROCURONIUM BROMIDE 60 MG: 10 SOLUTION INTRAVENOUS at 02:05

## 2024-05-09 RX ADMIN — CEFAZOLIN SODIUM 2 G: 2 SOLUTION INTRAVENOUS at 02:05

## 2024-05-09 RX ADMIN — SODIUM CHLORIDE, POTASSIUM CHLORIDE, SODIUM LACTATE AND CALCIUM CHLORIDE: 600; 310; 30; 20 INJECTION, SOLUTION INTRAVENOUS at 09:05

## 2024-05-09 RX ADMIN — FENTANYL CITRATE 100 MCG: 50 INJECTION, SOLUTION INTRAMUSCULAR; INTRAVENOUS at 02:05

## 2024-05-09 RX ADMIN — ACETAMINOPHEN 650 MG: 325 TABLET, FILM COATED ORAL at 02:05

## 2024-05-09 RX ADMIN — GABAPENTIN 300 MG: 300 CAPSULE ORAL at 10:05

## 2024-05-09 RX ADMIN — ENOXAPARIN SODIUM 40 MG: 40 INJECTION SUBCUTANEOUS at 10:05

## 2024-05-09 RX ADMIN — SODIUM CHLORIDE, SODIUM GLUCONATE, SODIUM ACETATE, POTASSIUM CHLORIDE AND MAGNESIUM CHLORIDE: 526; 502; 368; 37; 30 INJECTION, SOLUTION INTRAVENOUS at 01:05

## 2024-05-09 RX ADMIN — SUGAMMADEX 200 MG: 100 INJECTION, SOLUTION INTRAVENOUS at 06:05

## 2024-05-09 RX ADMIN — DEXAMETHASONE SODIUM PHOSPHATE 4 MG: 4 INJECTION, SOLUTION INTRA-ARTICULAR; INTRALESIONAL; INTRAMUSCULAR; INTRAVENOUS; SOFT TISSUE at 02:05

## 2024-05-09 RX ADMIN — Medication 100 MCG: at 02:05

## 2024-05-09 RX ADMIN — CEFAZOLIN 2 G: 330 INJECTION, POWDER, FOR SOLUTION INTRAMUSCULAR; INTRAVENOUS at 02:05

## 2024-05-09 RX ADMIN — Medication 200 MCG: at 02:05

## 2024-05-09 RX ADMIN — HYDROMORPHONE HYDROCHLORIDE 0.4 MG: 2 INJECTION, SOLUTION INTRAMUSCULAR; INTRAVENOUS; SUBCUTANEOUS at 07:05

## 2024-05-09 RX ADMIN — OXYCODONE HYDROCHLORIDE 10 MG: 10 TABLET ORAL at 10:05

## 2024-05-09 RX ADMIN — MUPIROCIN: 20 OINTMENT TOPICAL at 10:05

## 2024-05-09 RX ADMIN — ACETAMINOPHEN 650 MG: 325 TABLET, FILM COATED ORAL at 05:05

## 2024-05-09 RX ADMIN — METHOCARBAMOL 500 MG: 500 TABLET ORAL at 05:05

## 2024-05-09 RX ADMIN — Medication 6 MG: at 10:05

## 2024-05-09 RX ADMIN — OXYCODONE HYDROCHLORIDE 10 MG: 10 TABLET ORAL at 02:05

## 2024-05-09 RX ADMIN — OXYCODONE HYDROCHLORIDE 10 MG: 10 TABLET ORAL at 06:05

## 2024-05-09 RX ADMIN — ROCURONIUM BROMIDE 10 MG: 10 SOLUTION INTRAVENOUS at 02:05

## 2024-05-09 RX ADMIN — ROCURONIUM BROMIDE 10 MG: 10 SOLUTION INTRAVENOUS at 03:05

## 2024-05-09 RX ADMIN — MIDAZOLAM HYDROCHLORIDE 2 MG: 1 INJECTION, SOLUTION INTRAMUSCULAR; INTRAVENOUS at 01:05

## 2024-05-09 RX ADMIN — ONDANSETRON 4 MG: 2 INJECTION INTRAMUSCULAR; INTRAVENOUS at 06:05

## 2024-05-09 RX ADMIN — SODIUM CHLORIDE, POTASSIUM CHLORIDE, SODIUM LACTATE AND CALCIUM CHLORIDE 1000 ML: 600; 310; 30; 20 INJECTION, SOLUTION INTRAVENOUS at 12:05

## 2024-05-09 RX ADMIN — HYDROMORPHONE HYDROCHLORIDE 0.2 MG: 2 INJECTION, SOLUTION INTRAMUSCULAR; INTRAVENOUS; SUBCUTANEOUS at 06:05

## 2024-05-09 RX ADMIN — MUPIROCIN: 20 OINTMENT TOPICAL at 09:05

## 2024-05-09 RX ADMIN — ACETAMINOPHEN 650 MG: 325 TABLET, FILM COATED ORAL at 10:05

## 2024-05-09 RX ADMIN — LIDOCAINE HYDROCHLORIDE 80 MG: 20 INJECTION INTRAVENOUS at 02:05

## 2024-05-09 RX ADMIN — DOCUSATE SODIUM 100 MG: 100 CAPSULE, LIQUID FILLED ORAL at 10:05

## 2024-05-09 RX ADMIN — POLYETHYLENE GLYCOL 3350 17 G: 17 POWDER, FOR SOLUTION ORAL at 10:05

## 2024-05-09 RX ADMIN — DEXMEDETOMIDINE 4 MCG: 200 INJECTION, SOLUTION INTRAVENOUS at 04:05

## 2024-05-09 RX ADMIN — ROCURONIUM BROMIDE 20 MG: 10 SOLUTION INTRAVENOUS at 04:05

## 2024-05-09 RX ADMIN — CEFAZOLIN SODIUM 2 G: 2 SOLUTION INTRAVENOUS at 09:05
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
## 2024-05-09 NOTE — PLAN OF CARE
Problem: Adult Inpatient Plan of Care  Goal: Plan of Care Review  Outcome: Progressing  Goal: Patient-Specific Goal (Individualized)  Outcome: Progressing  Goal: Absence of Hospital-Acquired Illness or Injury  Outcome: Progressing  Goal: Optimal Comfort and Wellbeing  Outcome: Progressing  Goal: Readiness for Transition of Care  Outcome: Progressing     Problem: Acute Kidney Injury/Impairment  Goal: Fluid and Electrolyte Balance  Outcome: Progressing  Goal: Improved Oral Intake  Outcome: Progressing  Goal: Effective Renal Function  Outcome: Progressing     Problem: Wound  Goal: Optimal Coping  Outcome: Progressing  Goal: Optimal Functional Ability  Outcome: Progressing  Goal: Absence of Infection Signs and Symptoms  Outcome: Progressing  Goal: Improved Oral Intake  Outcome: Progressing  Goal: Optimal Pain Control and Function  Outcome: Progressing  Goal: Skin Health and Integrity  Outcome: Progressing  Goal: Optimal Wound Healing  Outcome: Progressing     Problem: Infection  Goal: Absence of Infection Signs and Symptoms  Outcome: Progressing     Problem: Skin Injury Risk Increased  Goal: Skin Health and Integrity  Outcome: Progressing     Problem: Fall Injury Risk  Goal: Absence of Fall and Fall-Related Injury  Outcome: Progressing

## 2024-05-09 NOTE — ANESTHESIA PROCEDURE NOTES
Intubation    Date/Time: 5/9/2024 2:11 PM    Performed by: Zeus Cortes  Authorized by: Sarbjit Joseph MD    Intubation:     Induction:  Intravenous    Intubated:  Postinduction    Mask Ventilation:  Easy mask    Attempts:  1    Attempted By:  Student    Method of Intubation:  Direct    Blade:  Ashley 3    Laryngeal View Grade: Grade IIA - cords partially seen      Difficult Airway Encountered?: No      Complications:  None    Airway Device:  Oral endotracheal tube    Airway Device Size:  7.5    Style/Cuff Inflation:  Cuffed (inflated to minimal occlusive pressure)    Tube secured:  22    Secured at:  The lips    Placement Verified By:  Capnometry    Complicating Factors:  None    Findings Post-Intubation:  BS equal bilateral and atraumatic/condition of teeth unchanged

## 2024-05-09 NOTE — ANESTHESIA POSTPROCEDURE EVALUATION
Anesthesia Post Evaluation    Patient: Venkata Douglas    Procedure(s) Performed: Procedure(s) (LRB):  ORIF, FRACTURE, FEMUR (Right)  ORIF, FRACTURE, RADIUS, DISTAL (Right)    Final Anesthesia Type: general      Patient location during evaluation: PACU  Patient participation: Yes- Able to Participate  Level of consciousness: awake and alert and oriented  Post-procedure vital signs: reviewed and stable  Pain management: adequate  Airway patency: patent    PONV status at discharge: No PONV  Anesthetic complications: no      Cardiovascular status: hemodynamically stable  Respiratory status: unassisted  Hydration status: euvolemic  Follow-up not needed.              Vitals Value Taken Time   /78 05/08/24 2221   Temp 37 °C (98.6 °F) 05/08/24 2210   Pulse 98 05/08/24 2224   Resp 18 05/08/24 2224   SpO2 99 % 05/08/24 2224   Vitals shown include unfiled device data.      No case tracking events are documented in the log.      Pain/Keli Score: Pain Rating Prior to Med Admin: 4 (5/8/2024 11:23 PM)  Pain Rating Post Med Admin: 1 (5/8/2024  2:38 PM)  Keli Score: 9 (5/8/2024 10:27 PM)
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
## 2024-05-09 NOTE — TRANSFER OF CARE
"Anesthesia Transfer of Care Note    Patient: Venkata Douglas    Procedure(s) Performed: Procedure(s) (LRB):  INSERTION, INTRAMEDULLARY CLAUDETTE, FEMUR (Right)  ORIF, FRACTURE, RADIUS, DISTAL (Right)    Patient location: PACU    Anesthesia Type: general    Transport from OR: Transported from OR on room air with adequate spontaneous ventilation    Post pain: adequate analgesia    Post assessment: no apparent anesthetic complications and tolerated procedure well    Post vital signs: stable    Level of consciousness: awake    Nausea/Vomiting: no nausea/vomiting    Complications: none    Transfer of care protocol was followed      Last vitals: Visit Vitals  /67   Pulse (!) 126   Temp 36.8 °C (98.2 °F)   Resp 16   Ht 5' 3" (1.6 m)   Wt 63.5 kg (140 lb)   SpO2 99%   BMI 24.80 kg/m²     "

## 2024-05-09 NOTE — ANESTHESIA POSTPROCEDURE EVALUATION
Anesthesia Post Evaluation    Patient: Venkata Douglas    Procedure(s) Performed: Procedure(s) (LRB):  ORIF, ANKLE (Right)  INCISION AND DRAINAGE, LOWER EXTREMITY (Right)  PINNING, TOE, PERCUTANEOUS (Right)  APPLICATION, SPLINT (Left)  INSERTION, TRACTION PIN, SKELETAL (Right)    Final Anesthesia Type: general      Patient location during evaluation: PACU  Patient participation: Yes- Able to Participate  Level of consciousness: awake and alert and oriented  Post-procedure vital signs: reviewed and stable  Pain management: adequate  Airway patency: patent    PONV status at discharge: No PONV  Anesthetic complications: no      Cardiovascular status: hemodynamically stable  Respiratory status: unassisted  Hydration status: euvolemic  Follow-up not needed.              Vitals Value Taken Time   /78 05/08/24 2221   Temp 37 °C (98.6 °F) 05/08/24 2210   Pulse 98 05/08/24 2224   Resp 18 05/08/24 2224   SpO2 99 % 05/08/24 2224   Vitals shown include unfiled device data.      No case tracking events are documented in the log.      Pain/Keli Score: Pain Rating Prior to Med Admin: 8 (5/8/2024  1:38 PM)  Pain Rating Post Med Admin: 1 (5/8/2024  2:38 PM)  Keli Score: 9 (5/8/2024  9:50 PM)           Quality 226: Preventive Care And Screening: Tobacco Use: Screening And Cessation Intervention: Patient screened for tobacco use and is an ex/non-smoker Quality 130: Documentation Of Current Medications In The Medical Record: Current Medications with Name, Dosage, Frequency, or Route not Documented, Reason not Given Detail Level: Detailed
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
## 2024-05-09 NOTE — BRIEF OP NOTE
[facility] - Periop Services  Brief Operative Note    SUMMARY     Surgery Date: 5/9/2024     Surgeons and Role:     * Phong Reaves MD - Primary    Assisting Surgeon: None    Pre-op Diagnosis:    R femoral shaft fx  R intra-articular DR fx    Post-op Diagnosis:  Same     Procedure(s) (LRB):  INSERTION, INTRAMEDULLARY CLAUDETTE, FEMUR (Right)  ORIF, FRACTURE, RADIUS, DISTAL (Right)    Anesthesia: General    Implants:  Implant Name Type Inv. Item Serial No.  Lot No. LRB No. Used Action   RFNA TI NAIL    SYNTHES [id number] Right 1 Implanted   SCREW BATSHEVA XL25 5X80MM - [id number]  SCREW BATSHEVA XL25 5X80MM  T[employer]fly [id number] Right 1 Implanted   SCREW BATSHEVA XL25 5X52MM - [id number]  SCREW BATSHEVA XL25 5X52MM  [employer] [id number] Right 1 Implanted   SCREW BATSHEVA XL25 5X32MM - [id number]  SCREW BATSHEVA XL25 5X32MM  Ticketfly [id number] Right 1 Implanted   SCREW BATSHEVA XL25 5X32MM - [id number]  SCREW BATSHEVA XL25 5X32MM  [employer] [id number] Right 1 Implanted   SCREW ALPS LP N BATSHEVA 2.7X16MM - [id number]  SCREW ALPS LP N BATSHEVA 2.7X16MM  [employer]  Right 3 Implanted   PLATE DVR CROSSLOCK R [id number] - [id number]  PLATE DVR CROSSLOCK R [id number]  [employer]ENN[employer]INC  Right 1 Implanted   SCREW SQUARE BATSHEVA TI 2.7X22MM - [id number]  SCREW SQUARE BATSHEVA TI 2.7X22MM  [employer]  Right 2 Implanted   MULTI DIRECTIONAL SCREW    [employer]ENN[employer]ER[employer]INC  Right 1 Implanted   SCREW SQUARE BATSHEVA TI 2.7X18MM - [id number]  SCREW SQUARE BATSHEVA TI 2.7X18MM  [employer]  Right 2 Implanted   LOCKING SCREW    [employer]INC  Right 1 Implanted   SCREW SQUARE BATSHEVA TI 2.7X20MM - [id number]  SCREW SQUARE BATSHEVA TI 2.7X20MM  [employer]INC  Right 1 Implanted     Estimated Blood Loss: 150cc    Estimated Blood Loss has been documented.         Specimens:   Specimen (24h ago, onward)      None          IV antibiotics for 23 hours. Mobilize with therapy. NWB RLE. OK to WB trough elbows BUE. Maintain splints clean, dry, and intact. Elevate extremities. Neurovascular checks. Pain control. DVT ppx. Placement likely.

## 2024-05-09 NOTE — BRIEF OP NOTE
[facility] - Periop Services  Brief Operative Note    SUMMARY     Surgery Date: 5/8/2024     Surgeons and Role:     * Phong Reaves MD - Primary    Assisting Surgeon: None    Pre-op Diagnosis:   Right open comminuted distal fibula fracture  Right femoral shaft fracture  Right pcl avulsion fracture  Right nondisplaced vertical patella fracture  Right distal radius fracture    Left Nondisplaced thumb metacarpal fracture  Nondisplaced right superior and inferior pubic rami fractures  Nondisplaced right acetabulum fracture    Post-op Diagnosis:    same    Procedure(s) (LRB):  ORIF, ANKLE (Right)  INCISION AND DRAINAGE, LOWER EXTREMITY (Right)  PINNING, TOE, PERCUTANEOUS (Right)  APPLICATION, SPLINT (Left)  INSERTION, TRACTION PIN, SKELETAL (Right)    Anesthesia: General    Implants:  Implant Name Type Inv. Item Serial No.  Lot No. LRB No. Used Action   PLATE VA-LCP FIB 6H R 2.7MM - [id number]  PLATE VA-LCP FIB 6H R 2.7MM  SYNTHES  Right 1 Implanted   SCREW 2.7MM X 16 LOCKING ANGLE - [id number]  SCREW 2.7MM X 16 LOCKING ANGLE  SYNTHES  Right 3 Implanted   SCREW 2.7MM X 18 LOCKING ANGLE - [id number]  SCREW 2.7MM X 18 LOCKING ANGLE  SYNTHES  Right 2 Implanted   SCREW CORTEX 2.7 X 16. - [id number]  SCREW CORTEX 2.7 X 16.  SYNTHES  Right 2 Implanted   SCREW BONE LOCK 2.7X10MM - [id number]  SCREW BONE LOCK 2.7X10MM  [employer]  Right 1 Implanted   SCREW CORTEX 2.7 X 18 - [id number]  SCREW CORTEX 2.7 X 18  SYNTHES  Right 1 Implanted   KWIRE TRCR SNGL .317U8QH 1.6MM - [id number]  KWIRE TRCR SNGL .[id number] 1.6MM  SHRESTHA PORTEX  Right 2 Implanted   KWIRE TRCR SNGL .791P6SM 1.1MM - [id number]  KWIRE TRCR SNGL .[id number] 1.1MM  SHRESTHA PORTEX  Right 2 Implanted   PIN SMOOTH TRCR SGL 9IN 2.4MM - [id number]  PIN SMOOTH TRCR SGL 9IN 2.4MM  SHRESTHA PORTEX  Right 1 Implanted       Estimated Blood Loss: 20cc    Estimated Blood Loss has been documented.         Specimens:   Specimen (24h ago, onward)      None             [id number]    Continue IV antibiotics for open fracture.  Traction to right lower extremity.  Maintain splint clean, dry, and intact.  Elevate extremity to include the right ankle as well as the bilateral upper extremities.  Neurovascular checks.  Pain control.  Patient will need return to the OR tomorrow for definitive fixation of the right femur and right distal radius.  NPO at midnight.

## 2024-05-09 NOTE — NURSING
Nurses Note -- 4 Eyes      5/9/2024   2:59 AM      Skin assessed during: Admit      [] No Altered Skin Integrity Present    [x]Prevention Measures Documented      [x] Yes- Altered Skin Integrity Present or Discovered   [] LDA Added if Not in Epic (Describe Wound)   [] New Altered Skin Integrity was Present on Admit and Documented in LDA   [x] Wound Image Taken    Wound Care Consulted? No    Attending Nurse:  Riana Garcia RN/Staff Member:  Nimisha Martell LPN

## 2024-05-09 NOTE — TRANSFER OF CARE
"Anesthesia Transfer of Care Note    Patient: Venkata Douglas    Procedure(s) Performed: Procedure(s) (LRB):  ORIF, ANKLE (Right)  INCISION AND DRAINAGE, LOWER EXTREMITY (Right)  PINNING, TOE, PERCUTANEOUS (Right)  APPLICATION, SPLINT (Left)  INSERTION, TRACTION PIN, SKELETAL (Right)    Patient location: PACU    Transport from OR: Transported from OR on room air with adequate spontaneous ventilation    Post pain: adequate analgesia    Post assessment: no apparent anesthetic complications    Post vital signs: stable    Level of consciousness: awake    Nausea/Vomiting: no nausea/vomiting    Complications: none    Transfer of care protocol was followed      Last vitals: Visit Vitals  /87   Pulse 109   Temp 37 °C (98.6 °F) (Oral)   Resp 16   Ht 5' 3" (1.6 m)   Wt 63.5 kg (140 lb)   SpO2 100%   BMI 24.80 kg/m²     "
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
## 2024-05-10 LAB
ABO + RH BLD: NORMAL
ABO + RH BLD: NORMAL
ALBUMIN SERPL-MCNC: 3.2 G/DL (ref 3.5–5)
ALBUMIN/GLOB SERPL: 1.8 RATIO (ref 1.1–2)
ALP SERPL-CCNC: 33 UNIT/L (ref 40–150)
ALT SERPL-CCNC: 30 UNIT/L (ref 0–55)
AST SERPL-CCNC: 62 UNIT/L (ref 5–34)
BASOPHILS # BLD AUTO: 0.01 X10(3)/MCL
BASOPHILS NFR BLD AUTO: 0.1 %
BILIRUB SERPL-MCNC: 0.6 MG/DL
BLD PROD TYP BPU: NORMAL
BLD PROD TYP BPU: NORMAL
BLOOD UNIT EXPIRATION DATE: NORMAL
BLOOD UNIT EXPIRATION DATE: NORMAL
BLOOD UNIT TYPE CODE: 6200
BLOOD UNIT TYPE CODE: 6200
BUN SERPL-MCNC: 8.1 MG/DL (ref 8.9–20.6)
CALCIUM SERPL-MCNC: 7.6 MG/DL (ref 8.4–10.2)
CHLORIDE SERPL-SCNC: 102 MMOL/L (ref 98–107)
CK SERPL-CCNC: 4355 U/L (ref 30–200)
CK SERPL-CCNC: 4764 U/L (ref 30–200)
CK SERPL-CCNC: 5032 U/L (ref 30–200)
CK SERPL-CCNC: 5548 U/L (ref 30–200)
CO2 SERPL-SCNC: 30 MMOL/L (ref 22–29)
CREAT SERPL-MCNC: 0.76 MG/DL (ref 0.73–1.18)
CROSSMATCH INTERPRETATION: NORMAL
CROSSMATCH INTERPRETATION: NORMAL
DISPENSE STATUS: NORMAL
DISPENSE STATUS: NORMAL
EOSINOPHIL # BLD AUTO: 0 X10(3)/MCL (ref 0–0.9)
EOSINOPHIL NFR BLD AUTO: 0 %
ERYTHROCYTE [DISTWIDTH] IN BLOOD BY AUTOMATED COUNT: 12 % (ref 11.5–17)
GFR SERPLBLD CREATININE-BSD FMLA CKD-EPI: >60 ML/MIN/1.73/M2
GLOBULIN SER-MCNC: 1.8 GM/DL (ref 2.4–3.5)
GLUCOSE SERPL-MCNC: 136 MG/DL (ref 74–100)
HCT VFR BLD AUTO: 19.3 % (ref 42–52)
HGB BLD-MCNC: 6.5 G/DL (ref 14–18)
IMM GRANULOCYTES # BLD AUTO: 0.07 X10(3)/MCL (ref 0–0.04)
IMM GRANULOCYTES NFR BLD AUTO: 0.6 %
LYMPHOCYTES # BLD AUTO: 1.05 X10(3)/MCL (ref 0.6–4.6)
LYMPHOCYTES NFR BLD AUTO: 9.7 %
MCH RBC QN AUTO: 30.8 PG (ref 27–31)
MCHC RBC AUTO-ENTMCNC: 33.7 G/DL (ref 33–36)
MCV RBC AUTO: 91.5 FL (ref 80–94)
MONOCYTES # BLD AUTO: 1.03 X10(3)/MCL (ref 0.1–1.3)
MONOCYTES NFR BLD AUTO: 9.5 %
NEUTROPHILS # BLD AUTO: 8.69 X10(3)/MCL (ref 2.1–9.2)
NEUTROPHILS NFR BLD AUTO: 80.1 %
NRBC BLD AUTO-RTO: 0 %
PLATELET # BLD AUTO: 154 X10(3)/MCL (ref 130–400)
PMV BLD AUTO: 9.8 FL (ref 7.4–10.4)
POTASSIUM SERPL-SCNC: 4 MMOL/L (ref 3.5–5.1)
PROT SERPL-MCNC: 5 GM/DL (ref 6.4–8.3)
RBC # BLD AUTO: 2.11 X10(6)/MCL (ref 4.7–6.1)
SODIUM SERPL-SCNC: 135 MMOL/L (ref 136–145)
UNIT NUMBER: NORMAL
UNIT NUMBER: NORMAL
WBC # SPEC AUTO: 10.85 X10(3)/MCL (ref 4.5–11.5)

## 2024-05-10 PROCEDURE — 36415 COLL VENOUS BLD VENIPUNCTURE: CPT | Performed by: NURSE PRACTITIONER

## 2024-05-10 PROCEDURE — 63600175 PHARM REV CODE 636 W HCPCS: Performed by: REHABILITATION UNIT

## 2024-05-10 PROCEDURE — 63600175 PHARM REV CODE 636 W HCPCS

## 2024-05-10 PROCEDURE — 25000003 PHARM REV CODE 250: Performed by: REHABILITATION UNIT

## 2024-05-10 PROCEDURE — 30233N1 TRANSFUSION OF NONAUTOLOGOUS RED BLOOD CELLS INTO PERIPHERAL VEIN, PERCUTANEOUS APPROACH: ICD-10-PCS | Performed by: SURGERY

## 2024-05-10 PROCEDURE — 36415 COLL VENOUS BLD VENIPUNCTURE: CPT | Performed by: REHABILITATION UNIT

## 2024-05-10 PROCEDURE — 80053 COMPREHEN METABOLIC PANEL: CPT | Performed by: REHABILITATION UNIT

## 2024-05-10 PROCEDURE — C1751 CATH, INF, PER/CENT/MIDLINE: HCPCS

## 2024-05-10 PROCEDURE — 86923 COMPATIBILITY TEST ELECTRIC: CPT

## 2024-05-10 PROCEDURE — P9016 RBC LEUKOCYTES REDUCED: HCPCS

## 2024-05-10 PROCEDURE — 36410 VNPNXR 3YR/> PHY/QHP DX/THER: CPT

## 2024-05-10 PROCEDURE — 94799 UNLISTED PULMONARY SVC/PX: CPT

## 2024-05-10 PROCEDURE — 85025 COMPLETE CBC W/AUTO DIFF WBC: CPT | Performed by: REHABILITATION UNIT

## 2024-05-10 PROCEDURE — 25000003 PHARM REV CODE 250

## 2024-05-10 PROCEDURE — 11000001 HC ACUTE MED/SURG PRIVATE ROOM

## 2024-05-10 PROCEDURE — 63600175 PHARM REV CODE 636 W HCPCS: Performed by: NURSE PRACTITIONER

## 2024-05-10 PROCEDURE — 99900031 HC PATIENT EDUCATION (STAT)

## 2024-05-10 PROCEDURE — 36430 TRANSFUSION BLD/BLD COMPNT: CPT

## 2024-05-10 PROCEDURE — 82550 ASSAY OF CK (CPK): CPT | Performed by: NURSE PRACTITIONER

## 2024-05-10 PROCEDURE — 99900035 HC TECH TIME PER 15 MIN (STAT)

## 2024-05-10 RX ORDER — HYDROMORPHONE HYDROCHLORIDE 2 MG/ML
0.2 INJECTION, SOLUTION INTRAMUSCULAR; INTRAVENOUS; SUBCUTANEOUS EVERY 6 HOURS PRN
Status: DISCONTINUED | OUTPATIENT
Start: 2024-05-10 | End: 2024-05-15 | Stop reason: HOSPADM

## 2024-05-10 RX ORDER — ONDANSETRON 4 MG/1
4 TABLET, ORALLY DISINTEGRATING ORAL EVERY 6 HOURS PRN
Status: DISCONTINUED | OUTPATIENT
Start: 2024-05-10 | End: 2024-05-15 | Stop reason: HOSPADM

## 2024-05-10 RX ORDER — KETOROLAC TROMETHAMINE 30 MG/ML
30 INJECTION, SOLUTION INTRAMUSCULAR; INTRAVENOUS EVERY 6 HOURS
Status: COMPLETED | OUTPATIENT
Start: 2024-05-10 | End: 2024-05-13

## 2024-05-10 RX ORDER — HYDROCODONE BITARTRATE AND ACETAMINOPHEN 500; 5 MG/1; MG/1
TABLET ORAL
Status: DISCONTINUED | OUTPATIENT
Start: 2024-05-10 | End: 2024-05-15 | Stop reason: HOSPADM

## 2024-05-10 RX ADMIN — OXYCODONE HYDROCHLORIDE 10 MG: 10 TABLET ORAL at 11:05

## 2024-05-10 RX ADMIN — MUPIROCIN: 20 OINTMENT TOPICAL at 10:05

## 2024-05-10 RX ADMIN — OXYCODONE HYDROCHLORIDE 10 MG: 10 TABLET ORAL at 07:05

## 2024-05-10 RX ADMIN — ACETAMINOPHEN 650 MG: 325 TABLET, FILM COATED ORAL at 10:05

## 2024-05-10 RX ADMIN — KETOROLAC TROMETHAMINE 30 MG: 30 INJECTION, SOLUTION INTRAMUSCULAR at 05:05

## 2024-05-10 RX ADMIN — OXYCODONE HYDROCHLORIDE 10 MG: 10 TABLET ORAL at 03:05

## 2024-05-10 RX ADMIN — DOCUSATE SODIUM 100 MG: 100 CAPSULE, LIQUID FILLED ORAL at 10:05

## 2024-05-10 RX ADMIN — GABAPENTIN 300 MG: 300 CAPSULE ORAL at 10:05

## 2024-05-10 RX ADMIN — GABAPENTIN 300 MG: 300 CAPSULE ORAL at 09:05

## 2024-05-10 RX ADMIN — SODIUM CHLORIDE, POTASSIUM CHLORIDE, SODIUM LACTATE AND CALCIUM CHLORIDE: 600; 310; 30; 20 INJECTION, SOLUTION INTRAVENOUS at 01:05

## 2024-05-10 RX ADMIN — KETOROLAC TROMETHAMINE 30 MG: 30 INJECTION, SOLUTION INTRAMUSCULAR at 11:05

## 2024-05-10 RX ADMIN — HYDROMORPHONE HYDROCHLORIDE 0.2 MG: 2 INJECTION INTRAMUSCULAR; INTRAVENOUS; SUBCUTANEOUS at 09:05

## 2024-05-10 RX ADMIN — SODIUM CHLORIDE, POTASSIUM CHLORIDE, SODIUM LACTATE AND CALCIUM CHLORIDE: 600; 310; 30; 20 INJECTION, SOLUTION INTRAVENOUS at 06:05

## 2024-05-10 RX ADMIN — OXYCODONE HYDROCHLORIDE 10 MG: 10 TABLET ORAL at 02:05

## 2024-05-10 RX ADMIN — CEFAZOLIN SODIUM 2 G: 2 SOLUTION INTRAVENOUS at 06:05

## 2024-05-10 RX ADMIN — ACETAMINOPHEN 650 MG: 325 TABLET, FILM COATED ORAL at 01:05

## 2024-05-10 RX ADMIN — GABAPENTIN 300 MG: 300 CAPSULE ORAL at 03:05

## 2024-05-10 RX ADMIN — KETOROLAC TROMETHAMINE 30 MG: 30 INJECTION, SOLUTION INTRAMUSCULAR at 12:05

## 2024-05-10 RX ADMIN — METHOCARBAMOL 500 MG: 500 TABLET ORAL at 06:05

## 2024-05-10 RX ADMIN — Medication 6 MG: at 10:05

## 2024-05-10 RX ADMIN — CEFAZOLIN SODIUM 2 G: 2 SOLUTION INTRAVENOUS at 10:05

## 2024-05-10 RX ADMIN — CEFAZOLIN SODIUM 2 G: 2 SOLUTION INTRAVENOUS at 03:05

## 2024-05-10 RX ADMIN — ENOXAPARIN SODIUM 40 MG: 40 INJECTION SUBCUTANEOUS at 09:05

## 2024-05-10 RX ADMIN — SODIUM CHLORIDE, POTASSIUM CHLORIDE, SODIUM LACTATE AND CALCIUM CHLORIDE: 600; 310; 30; 20 INJECTION, SOLUTION INTRAVENOUS at 07:05

## 2024-05-10 RX ADMIN — ENOXAPARIN SODIUM 40 MG: 40 INJECTION SUBCUTANEOUS at 10:05

## 2024-05-10 RX ADMIN — METHOCARBAMOL 500 MG: 500 TABLET ORAL at 01:05

## 2024-05-10 RX ADMIN — DOCUSATE SODIUM 100 MG: 100 CAPSULE, LIQUID FILLED ORAL at 09:05

## 2024-05-10 RX ADMIN — ACETAMINOPHEN 650 MG: 325 TABLET, FILM COATED ORAL at 09:05

## 2024-05-10 RX ADMIN — METHOCARBAMOL 500 MG: 500 TABLET ORAL at 10:05

## 2024-05-10 RX ADMIN — ACETAMINOPHEN 650 MG: 325 TABLET, FILM COATED ORAL at 05:05

## 2024-05-10 RX ADMIN — ONDANSETRON 4 MG: 4 TABLET, ORALLY DISINTEGRATING ORAL at 10:05

## 2024-05-10 RX ADMIN — ACETAMINOPHEN 650 MG: 325 TABLET, FILM COATED ORAL at 06:05

## 2024-05-10 NOTE — PT/OT/SLP PROGRESS
Physical Therapy      Patient Name:  Venkata Douglas   MRN:  [id number]    Patient not seen today secondary to low H/H requiring blood transfusion c pt refusal 2/2 pain . Will follow-up as schedule permits.    Clarified with ortho NP: pt does not need bracing for R knee; however, no significant ROM at knee. NWB RLE.
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
## 2024-05-10 NOTE — ANESTHESIA POSTPROCEDURE EVALUATION
Anesthesia Post Evaluation    Patient: Venkata Douglas    Procedure(s) Performed: Procedure(s) (LRB):  INSERTION, INTRAMEDULLARY CLAUDETTE, FEMUR (Right)  ORIF, FRACTURE, RADIUS, DISTAL (Right)    Final Anesthesia Type: general      Patient location during evaluation: PACU  Patient participation: Yes- Able to Participate  Level of consciousness: awake and alert and oriented  Post-procedure vital signs: reviewed and stable  Pain management: adequate  Airway patency: patent    PONV status at discharge: No PONV  Anesthetic complications: no      Cardiovascular status: hemodynamically stable  Respiratory status: unassisted  Hydration status: euvolemic  Follow-up not needed.              Vitals Value Taken Time   /79 05/10/24 0127   Temp 36.6 °C (97.9 °F) 05/10/24 0127   Pulse 104 05/10/24 0127   Resp 18 05/10/24 0253   SpO2 95 % 05/10/24 0127         Event Time   Out of Recovery 05/09/2024 19:35:37         Pain/Keli Score: Pain Rating Prior to Med Admin: 8 (5/10/2024  2:53 AM)  Pain Rating Post Med Admin: 2 (5/10/2024  3:53 AM)  Keli Score: 9 (5/9/2024  6:50 PM)

## 2024-05-10 NOTE — PROGRESS NOTES
"   Trauma Surgery   Progress Note  Admit Date: 5/8/2024  HD#2  POD#1 Day Post-Op    Subjective:   Interval history:  NAEO. Afebrile, pain worse today than yesterday. Added IV dilaudid 0.2 mg q6h for breakthrough pain. Passing flatus, tolerating clear liquid diet. CK decreased from last night with aggressive IV fluids. Voiding spontaneously with good urine output. Denies chest pain, sob, palpitations, headache, nausea, vomting, fever, or chills,    Home Meds: No current outpatient medications   Scheduled Meds:   acetaminophen  650 mg Oral Q4H    ceFAZolin (Ancef) IV (PEDS and ADULTS)  2 g Intravenous Q8H    docusate sodium  100 mg Oral BID    enoxparin  40 mg Subcutaneous Q12H    gabapentin  300 mg Oral TID    methocarbamoL  500 mg Oral Q8H    mupirocin   Nasal BID    polyethylene glycol  17 g Oral BID     Continuous Infusions:   lactated ringers   Intravenous Continuous 200 mL/hr at 05/10/24 0141 New Bag at 05/10/24 0141     PRN Meds:  Current Facility-Administered Medications:     0.9%  NaCl infusion (for blood administration), , Intravenous, Q24H PRN    magnesium hydroxide 400 mg/5 ml, 30 mL, Oral, Daily PRN    melatonin, 6 mg, Oral, Nightly PRN    oxyCODONE, 5 mg, Oral, Q4H PRN    oxyCODONE, 10 mg, Oral, Q4H PRN     Objective:     VITAL SIGNS: 24 HR MIN & MAX LAST   Temp  Min: 97.9 °F (36.6 °C)  Max: 99.5 °F (37.5 °C)  97.9 °F (36.6 °C)   BP  Min: 114/66  Max: 152/81  131/79    Pulse  Min: 88  Max: 126  104    Resp  Min: 16  Max: 18  18    SpO2  Min: 92 %  Max: 99 %  95 %      HT: 5' 3" (160 cm)  WT: 63.5 kg (140 lb)  BMI: 24.8     Intake/output:  Intake/Output - Last 3 Shifts         05/08 0700  05/09 0659 05/09 0700  05/10 0659    I.V. (mL/kg) 2099 (33.1)     IV Piggyback 3150 1400    Total Intake(mL/kg) 5249 (82.7) 1400 (22)    Urine (mL/kg/hr) 2300 3150 (2.1)    Total Output 2300 3150    Net +6439 -4960                  Intake/Output Summary (Last 24 hours) at 5/10/2024 0554  Last data filed at 5/10/2024 " "0137  Gross per 24 hour   Intake 1400 ml   Output 3150 ml   Net -1750 ml         Lines/drains/airway:       Peripheral IV - Single Lumen 05/08/24 1207 20 G Anterior;Distal;Left Upper Arm (Active)   Site Assessment Clean;Dry;Intact 05/10/24 0424   Extremity Assessment Distal to IV No abnormal discoloration 05/09/24 1845   Line Status Infusing 05/10/24 0424   Dressing Status Clean;Dry;Intact 05/10/24 0424   Dressing Intervention Integrity maintained 05/10/24 0424   Number of days: 1            Urethral Catheter 05/08/24 2123 Double-lumen;Non-latex;Silicone 16 Fr. (Active)   Site Assessment Clean;Intact 05/09/24 0800   Collection Container Urimeter 05/09/24 1910   Securement Method secured to top of thigh w/ adhesive device 05/09/24 1910   Catheter Care Performed yes 05/09/24 0800   Reason for Continuing Urinary Catheterization Post operative 05/09/24 1910   CAUTI Prevention Bundle Securement Device in place with 1" slack;Intact seal between catheter & drainage tubing;Drainage bag/urimeter off the floor;Sheeting clip in use;No dependent loops or kinks;Drainage bag/urimeter not overfilled (<2/3 full);Drainage bag/urimeter below bladder 05/09/24 0800   Output (mL) 400 mL 05/09/24 0011   Number of days: 1       Physical examination:  Gen: NAD, AAOx3, answering questions appropriately  HEENT: bruising to right superior eyelid, scattered abrasions to right temple,   CV: RR, right peripheral pulse detectable with doppler.  Resp: NWOB  Abd: S/NT/ND  Msk: moving all extremities spontaneously and purposefully splints to right and left forearm and right lower leg clean dry and intact. Significant swelling to RLE, and bilateral UE. Compartments soft and compressible  Neuro: CN II-XII grossly intact  Skin/wounds: scattered abrasions to limbs    Labs:  Renal:  Recent Labs     05/08/24  1208 05/09/24  0502 05/10/24  0442   BUN 15.2 10.3 8.1*   CREATININE 1.39* 0.82 0.76     Recent Labs     05/08/24  1237 05/08/24  1451 " "05/08/24  1701 05/08/24  2339 05/09/24  0502   LACTIC 4.4* 4.4* 2.7* 4.2* 1.2     FEN/GI:  Recent Labs     05/08/24  1208 05/09/24  0502 05/10/24  0442    138 135*   K 4.7 3.8 4.0   CO2 23 27 30*   CALCIUM 9.5 8.0* 7.6*   MG  --  2.10  --    PHOS  --  3.0  --    ALBUMIN 4.6 4.0 3.2*   BILITOT 0.7 0.9 0.6   * 73* 62*   ALKPHOS 73 35* 33*   * 44 30     Heme:  Recent Labs     05/08/24  1208 05/09/24  0502 05/09/24  2102 05/10/24  0442   HGB 15.9 8.5* 8.3* 6.5*   HCT 45.9 25.1* 27.7* 19.3*    187 190 154   PTT 28.8  --   --   --    INR 1.1  --   --   --      ID:  Recent Labs     05/08/24  1208 05/09/24  0502 05/09/24  2102 05/10/24  0442   WBC 30.36  30.36* 11.02 14.12* 10.85     CBG:  Recent Labs     05/08/24  1208 05/09/24  0502 05/10/24  0442   GLUCOSE 183* 122* 136*      No results for input(s): "POCTGLUCOSE" in the last 72 hours.   Cardiovascular:  No results for input(s): "TROPONINI", "CKTOTAL", "CKMB", "BNP" in the last 168 hours.  I have reviewed all pertinent lab results within the past 24 hours.    Imaging:  SURG FL Surgery Fluoro Usage   Final Result      SURG FL Surgery Fluoro Usage   Final Result      CT 3D RECON WITH INDEPENDENT WS   Final Result      Fractures of the femur, tibia, fibula and patella.         Electronically signed by: Hermila Kidd   Date:    05/08/2024   Time:    18:16      CT 3D RECON WITH INDEPENDENT WS   Final Result      Ankle and foot fractures as described.         Electronically signed by: Hermila Kidd   Date:    05/08/2024   Time:    18:22      X-Ray Hand 2 View Left   Final Result      Nondisplaced fracture of the 1st metacarpal.         Electronically signed by: Hermila Kidd   Date:    05/08/2024   Time:    16:47      X-Ray Wrist 2 View Right   Final Result      There is improved alignment of the distal radial fracture following reduction.         Electronically signed by: Hermila Kidd   Date:    05/08/2024   Time:    16:46      CT 3D " RECON WITHOUT INDEPENDENT WS   Final Result   FINDINGS/   3D reconstructions of the pelvis were created based on source data from accession number [id number].  Please see that report for details.         Electronically signed by: Jak Porter   Date:    05/08/2024   Time:    15:51      CT 3D RECON WITHOUT INDEPENDENT WS   Final Result   FINDINGS/   3D reconstructions of the right knee were created based on source data from accession number [id number]  . Please see that report for details.         Electronically signed by: Jak Porter   Date:    05/08/2024   Time:    15:50      CT 3D RECON WITHOUT INDEPENDENT WS   Final Result   FINDINGS/   3D reconstructions of the right ankle were created based on source data from accession number [id number]  . Please see that report for details.         Electronically signed by: Jak Porter   Date:    05/08/2024   Time:    15:50      X-Ray Foot Complete Right   Final Result      Fractures as above.         Electronically signed by: Rio Nicole   Date:    05/08/2024   Time:    14:54      X-Ray Forearm Right   Final Result      1. Displaced fracture of the distal radius.   2. Ulnar styloid process fracture.         Electronically signed by: Hermila Kidd   Date:    05/08/2024   Time:    15:24      X-Ray Femur 2 View Right   Final Result      Fracture as above with lipohemarthrosis.         Electronically signed by: Rio Nicole   Date:    05/08/2024   Time:    13:59      X-Ray Tibia Fibula 2 View Right   Final Result      As above.         Electronically signed by: Jak Porter   Date:    05/08/2024   Time:    14:49      CTA Runoff ABD PEL Bilat Lower Ext   Final Result      1. The right peroneal artery is poorly opacified at the level of the fibular fracture, I cannot exclude injury to this artery.  Otherwise widely patent right lower extremity arteries.   2. Left lower extremity arteries are widely patent.   3. Fractures of the right femur, patella, medial tibial plateau  and fibula as discussed.         Electronically signed by: Jak Porter   Date:    05/08/2024   Time:    13:39      CT Chest Abdomen Pelvis With IV Contrast (XPD) NO Oral Contrast   Final Result      Nondisplaced fracture of the superior and inferior pubic ramus on the right side at the level of the pubis symphysis.      Nondisplaced fracture of the acetabulum the right side      Mild ground-glass opacity in the superior segment of the right lower lobe medially which could represent a small area of pulmonary contusion      Nondisplaced left 7th rib fracture posteriorly      There are areas of linear hypoattenuation in the right kidney in the mid and lower pole.  There is no associated perinephric stranding.  No inflammatory changes are seen.  No subcapsular or retroperitoneal hematoma is seen.  These areas of linear hypoattenuation may represent junctional cortical defects but given the patient's history renal injury cannot be completely excluded.  Follow-up is recommended.         Electronically signed by: [place]   Date:    05/08/2024   Time:    13:08      CT Cervical Spine Without Contrast   Final Result      No acute fracture or malalignment identified.         Electronically signed by: Addy Zapata   Date:    05/08/2024   Time:    12:49      CT Head Without Contrast   Final Result      No acute intracranial findings identified.         Electronically signed by: Addy Zapata   Date:    05/08/2024   Time:    12:46      X-Ray Pelvis Routine AP   Final Result      As above.         Electronically signed by: Jak Porter   Date:    05/08/2024   Time:    12:57      X-Ray Chest 1 View   Final Result      No acute chest disease is identified.         Electronically signed by: Rio Nicole   Date:    05/08/2024   Time:    12:08         I have reviewed all pertinent imaging results/findings within the past 24 hours.    Micro/Path/Other:  Microbiology Results (last 7 days)       ** No results found for the last  168 hours. **           Pathology Results  (Last 7 days)      None             Problems list:  Active Problem List with Overview Notes    Diagnosis Date Noted    Leukocytosis 05/08/2024    Fracture of right ulna 05/08/2024    Distal radius fracture, right 05/08/2024    Closed fracture of right foot 05/08/2024    Open right ankle fracture 05/08/2024    Closed fracture of right tibial plateau 05/08/2024    Closed fracture of right patella 05/08/2024    Other fracture of right femur, initial encounter for closed fracture 05/08/2024    Unspecified fracture of right acetabulum, initial encounter for closed fracture 05/08/2024    Closed fracture of pubic ramus 05/08/2024    DONAL (acute kidney injury) 05/08/2024    Lactic acidosis 05/08/2024    Pulmonary contusion 05/08/2024    Closed fracture of one rib of left side 05/08/2024    Closed fracture of shaft of right femur 05/08/2024        Assessment & Plan:   Closed fracture of right acetabulum  Closed fracture of posterior 7th  rib of left side  Closed fracture of pubic ramus   Closed fracture of right femur    Closed fracture of right patella   Closed fracture of right tibial plateau   Open right ankle fracture  Closed fracture of right foot  Distal radius fracture, right  Fracture of right ulna  Ancef x 23 hours post op.    Multimodal pain control.    Orthopedics following  S/p IMR of right femur and fixation of right radius 5/9  Does not meet criteria for abdominal binder.  Lovenox BID  Ct recon completed  Clear liquids ADAT  Multimodal pain control.    WB trough elbows BUE   NWB RLE   Elevate extremities  Neurovascular checks         Pulmonary contusion  Out of bed once able.    PT and OT once able.    Aggressive incentive spirometry.    On room air.     Anemia  H/H of 6.5/19.3  2 units pRBC to transfuse today    Lactic acidosis- resolved  1 L IV fluid bolus given.      DONAL (acute kidney injury)-resolved  Aggressive IV fluids.      Leukocytosis- resolved  Likely  reactive to trauma.    continue to monitor with daily labs     Dispo: Pending clinical progress       Luis Muniz DO  [facility], PGY-1

## 2024-05-10 NOTE — PT/OT/SLP PROGRESS
Occupational Therapy      Patient Name:  Venkata Douglas   MRN:  [id number]    Patient not seen today secondary to low H&H and pain not well controlled. Requesting therapy follow up tomorrow. Will follow-up 5/11.    5/10/2024

## 2024-05-11 LAB
ALBUMIN SERPL-MCNC: 2.7 G/DL (ref 3.5–5)
ALBUMIN/GLOB SERPL: 1 RATIO (ref 1.1–2)
ALP SERPL-CCNC: 47 UNIT/L (ref 40–150)
ALT SERPL-CCNC: 42 UNIT/L (ref 0–55)
AST SERPL-CCNC: 69 UNIT/L (ref 5–34)
BASOPHILS # BLD AUTO: 0.04 X10(3)/MCL
BASOPHILS NFR BLD AUTO: 0.6 %
BILIRUB SERPL-MCNC: 0.5 MG/DL
BUN SERPL-MCNC: 5.8 MG/DL (ref 8.9–20.6)
CALCIUM SERPL-MCNC: 7.8 MG/DL (ref 8.4–10.2)
CHLORIDE SERPL-SCNC: 108 MMOL/L (ref 98–107)
CK SERPL-CCNC: 4782 U/L (ref 30–200)
CO2 SERPL-SCNC: 27 MMOL/L (ref 22–29)
CREAT SERPL-MCNC: 0.68 MG/DL (ref 0.73–1.18)
EOSINOPHIL # BLD AUTO: 0.15 X10(3)/MCL (ref 0–0.9)
EOSINOPHIL NFR BLD AUTO: 2.2 %
ERYTHROCYTE [DISTWIDTH] IN BLOOD BY AUTOMATED COUNT: 12.9 % (ref 11.5–17)
GFR SERPLBLD CREATININE-BSD FMLA CKD-EPI: >60 ML/MIN/1.73/M2
GLOBULIN SER-MCNC: 2.6 GM/DL (ref 2.4–3.5)
GLUCOSE SERPL-MCNC: 113 MG/DL (ref 74–100)
HCT VFR BLD AUTO: 25.2 % (ref 42–52)
HGB BLD-MCNC: 8.5 G/DL (ref 14–18)
IMM GRANULOCYTES # BLD AUTO: 0.09 X10(3)/MCL (ref 0–0.04)
IMM GRANULOCYTES NFR BLD AUTO: 1.3 %
LYMPHOCYTES # BLD AUTO: 1.66 X10(3)/MCL (ref 0.6–4.6)
LYMPHOCYTES NFR BLD AUTO: 24.1 %
MCH RBC QN AUTO: 30.2 PG (ref 27–31)
MCHC RBC AUTO-ENTMCNC: 33.7 G/DL (ref 33–36)
MCV RBC AUTO: 89.7 FL (ref 80–94)
MONOCYTES # BLD AUTO: 0.58 X10(3)/MCL (ref 0.1–1.3)
MONOCYTES NFR BLD AUTO: 8.4 %
NEUTROPHILS # BLD AUTO: 4.36 X10(3)/MCL (ref 2.1–9.2)
NEUTROPHILS NFR BLD AUTO: 63.4 %
NRBC BLD AUTO-RTO: 0.3 %
PLATELET # BLD AUTO: 128 X10(3)/MCL (ref 130–400)
PMV BLD AUTO: 9.8 FL (ref 7.4–10.4)
POTASSIUM SERPL-SCNC: 3.6 MMOL/L (ref 3.5–5.1)
PROT SERPL-MCNC: 5.3 GM/DL (ref 6.4–8.3)
RBC # BLD AUTO: 2.81 X10(6)/MCL (ref 4.7–6.1)
SODIUM SERPL-SCNC: 142 MMOL/L (ref 136–145)
WBC # SPEC AUTO: 6.88 X10(3)/MCL (ref 4.5–11.5)

## 2024-05-11 PROCEDURE — 82550 ASSAY OF CK (CPK): CPT | Performed by: NURSE PRACTITIONER

## 2024-05-11 PROCEDURE — 63600175 PHARM REV CODE 636 W HCPCS: Performed by: NURSE PRACTITIONER

## 2024-05-11 PROCEDURE — 97162 PT EVAL MOD COMPLEX 30 MIN: CPT

## 2024-05-11 PROCEDURE — 97167 OT EVAL HIGH COMPLEX 60 MIN: CPT

## 2024-05-11 PROCEDURE — 63600175 PHARM REV CODE 636 W HCPCS: Performed by: REHABILITATION UNIT

## 2024-05-11 PROCEDURE — 85025 COMPLETE CBC W/AUTO DIFF WBC: CPT | Performed by: REHABILITATION UNIT

## 2024-05-11 PROCEDURE — 11000001 HC ACUTE MED/SURG PRIVATE ROOM

## 2024-05-11 PROCEDURE — 36415 COLL VENOUS BLD VENIPUNCTURE: CPT | Performed by: NURSE PRACTITIONER

## 2024-05-11 PROCEDURE — 25000003 PHARM REV CODE 250: Performed by: REHABILITATION UNIT

## 2024-05-11 PROCEDURE — 36415 COLL VENOUS BLD VENIPUNCTURE: CPT | Performed by: REHABILITATION UNIT

## 2024-05-11 PROCEDURE — 80053 COMPREHEN METABOLIC PANEL: CPT | Performed by: REHABILITATION UNIT

## 2024-05-11 PROCEDURE — 25000003 PHARM REV CODE 250: Performed by: NURSE PRACTITIONER

## 2024-05-11 RX ORDER — AMOXICILLIN 250 MG
2 CAPSULE ORAL 2 TIMES DAILY
Status: DISCONTINUED | OUTPATIENT
Start: 2024-05-11 | End: 2024-05-15 | Stop reason: HOSPADM

## 2024-05-11 RX ADMIN — KETOROLAC TROMETHAMINE 30 MG: 30 INJECTION, SOLUTION INTRAMUSCULAR at 06:05

## 2024-05-11 RX ADMIN — GABAPENTIN 300 MG: 300 CAPSULE ORAL at 09:05

## 2024-05-11 RX ADMIN — OXYCODONE HYDROCHLORIDE 10 MG: 10 TABLET ORAL at 10:05

## 2024-05-11 RX ADMIN — SODIUM CHLORIDE, POTASSIUM CHLORIDE, SODIUM LACTATE AND CALCIUM CHLORIDE: 600; 310; 30; 20 INJECTION, SOLUTION INTRAVENOUS at 12:05

## 2024-05-11 RX ADMIN — ACETAMINOPHEN 650 MG: 325 TABLET, FILM COATED ORAL at 02:05

## 2024-05-11 RX ADMIN — CEFAZOLIN SODIUM 2 G: 2 SOLUTION INTRAVENOUS at 02:05

## 2024-05-11 RX ADMIN — ACETAMINOPHEN 650 MG: 325 TABLET, FILM COATED ORAL at 09:05

## 2024-05-11 RX ADMIN — KETOROLAC TROMETHAMINE 30 MG: 30 INJECTION, SOLUTION INTRAMUSCULAR at 05:05

## 2024-05-11 RX ADMIN — METHOCARBAMOL 500 MG: 500 TABLET ORAL at 06:05

## 2024-05-11 RX ADMIN — MUPIROCIN: 20 OINTMENT TOPICAL at 09:05

## 2024-05-11 RX ADMIN — OXYCODONE HYDROCHLORIDE 10 MG: 10 TABLET ORAL at 11:05

## 2024-05-11 RX ADMIN — CEFAZOLIN SODIUM 2 G: 2 SOLUTION INTRAVENOUS at 06:05

## 2024-05-11 RX ADMIN — ENOXAPARIN SODIUM 40 MG: 40 INJECTION SUBCUTANEOUS at 09:05

## 2024-05-11 RX ADMIN — OXYCODONE HYDROCHLORIDE 10 MG: 10 TABLET ORAL at 07:05

## 2024-05-11 RX ADMIN — CEFAZOLIN SODIUM 2 G: 2 SOLUTION INTRAVENOUS at 10:05

## 2024-05-11 RX ADMIN — ACETAMINOPHEN 650 MG: 325 TABLET, FILM COATED ORAL at 05:05

## 2024-05-11 RX ADMIN — GABAPENTIN 300 MG: 300 CAPSULE ORAL at 02:05

## 2024-05-11 RX ADMIN — SODIUM CHLORIDE, POTASSIUM CHLORIDE, SODIUM LACTATE AND CALCIUM CHLORIDE: 600; 310; 30; 20 INJECTION, SOLUTION INTRAVENOUS at 02:05

## 2024-05-11 RX ADMIN — SENNOSIDES AND DOCUSATE SODIUM 2 TABLET: 8.6; 5 TABLET ORAL at 09:05

## 2024-05-11 RX ADMIN — SODIUM CHLORIDE, POTASSIUM CHLORIDE, SODIUM LACTATE AND CALCIUM CHLORIDE: 600; 310; 30; 20 INJECTION, SOLUTION INTRAVENOUS at 07:05

## 2024-05-11 RX ADMIN — KETOROLAC TROMETHAMINE 30 MG: 30 INJECTION, SOLUTION INTRAMUSCULAR at 11:05

## 2024-05-11 RX ADMIN — OXYCODONE HYDROCHLORIDE 10 MG: 10 TABLET ORAL at 03:05

## 2024-05-11 RX ADMIN — POLYETHYLENE GLYCOL 3350 17 G: 17 POWDER, FOR SOLUTION ORAL at 09:05

## 2024-05-11 RX ADMIN — METHOCARBAMOL 500 MG: 500 TABLET ORAL at 09:05

## 2024-05-11 RX ADMIN — METHOCARBAMOL 500 MG: 500 TABLET ORAL at 02:05

## 2024-05-11 RX ADMIN — OXYCODONE HYDROCHLORIDE 5 MG: 5 TABLET ORAL at 02:05

## 2024-05-11 RX ADMIN — SODIUM CHLORIDE, POTASSIUM CHLORIDE, SODIUM LACTATE AND CALCIUM CHLORIDE: 600; 310; 30; 20 INJECTION, SOLUTION INTRAVENOUS at 09:05
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
## 2024-05-11 NOTE — PROGRESS NOTES
"   Trauma Surgery   Progress Note  Admit Date: 5/8/2024  HD#3  POD#2 Days Post-Op    Subjective:   Interval history:  AFVSS. No BM. Pain improving . S/p 2u PRBC     Home Meds: No current outpatient medications   Scheduled Meds:   acetaminophen  650 mg Oral Q4H    ceFAZolin (Ancef) IV (PEDS and ADULTS)  2 g Intravenous Q8H    docusate sodium  100 mg Oral BID    enoxparin  40 mg Subcutaneous Q12H    gabapentin  300 mg Oral TID    ketorolac  30 mg Intravenous Q6H    methocarbamoL  500 mg Oral Q8H    mupirocin   Nasal BID    polyethylene glycol  17 g Oral BID     Continuous Infusions:   lactated ringers   Intravenous Continuous 200 mL/hr at 05/11/24 0717 New Bag at 05/11/24 0717     PRN Meds:  Current Facility-Administered Medications:     0.9%  NaCl infusion (for blood administration), , Intravenous, Q24H PRN    HYDROmorphone, 0.2 mg, Intravenous, Q6H PRN    magnesium hydroxide 400 mg/5 ml, 30 mL, Oral, Daily PRN    melatonin, 6 mg, Oral, Nightly PRN    ondansetron, 4 mg, Oral, Q6H PRN    oxyCODONE, 5 mg, Oral, Q4H PRN    oxyCODONE, 10 mg, Oral, Q4H PRN     Objective:     VITAL SIGNS: 24 HR MIN & MAX LAST   Temp  Min: 98 °F (36.7 °C)  Max: 99.7 °F (37.6 °C)  99.5 °F (37.5 °C)   BP  Min: 125/67  Max: 160/73  136/69    Pulse  Min: 95  Max: 120  95    Resp  Min: 18  Max: 18  18    SpO2  Min: 91 %  Max: 98 %  95 %      HT: 5' 3" (160 cm)  WT: 63.5 kg (140 lb)  BMI: 24.8     Intake/output:  Intake/Output - Last 3 Shifts         05/09 0700  05/10 0659 05/10 0700  05/11 0659 05/11 0700  05/12 0659    I.V. (mL/kg)       Blood  937.5     IV Piggyback 1400      Total Intake(mL/kg) 1400 (22) 937.5 (14.8)     Urine (mL/kg/hr) 3150 (2.1) 4250 (2.8)     Total Output 3150 4250     Net -1750 -3312.5                    Intake/Output Summary (Last 24 hours) at 5/11/2024 0825  Last data filed at 5/11/2024 0550  Gross per 24 hour   Intake 937.5 ml   Output 3550 ml   Net -2612.5 ml         Lines/drains/airway:       Peripheral IV - Single " "Lumen 05/08/24 1207 20 G Anterior;Distal;Left Upper Arm (Active)   Site Assessment Clean;Dry;Intact 05/10/24 0424   Extremity Assessment Distal to IV No abnormal discoloration 05/09/24 1845   Line Status Infusing 05/10/24 0424   Dressing Status Clean;Dry;Intact 05/10/24 0424   Dressing Intervention Integrity maintained 05/10/24 0424   Number of days: 1            Urethral Catheter 05/08/24 2123 Double-lumen;Non-latex;Silicone 16 Fr. (Active)   Site Assessment Clean;Intact 05/09/24 0800   Collection Container Urimeter 05/09/24 1910   Securement Method secured to top of thigh w/ adhesive device 05/09/24 1910   Catheter Care Performed yes 05/09/24 0800   Reason for Continuing Urinary Catheterization Post operative 05/09/24 1910   CAUTI Prevention Bundle Securement Device in place with 1" slack;Intact seal between catheter & drainage tubing;Drainage bag/urimeter off the floor;Sheeting clip in use;No dependent loops or kinks;Drainage bag/urimeter not overfilled (<2/3 full);Drainage bag/urimeter below bladder 05/09/24 0800   Output (mL) 400 mL 05/09/24 0011   Number of days: 1       Physical examination:  Gen: NAD, AAOx3, answering questions appropriately  HEENT: bruising to right superior eyelid, scattered abrasions to right temple,   CV: RR, right peripheral pulse detectable with doppler.  Resp: NWOB  Abd: S/NT/ND  Msk: moving all extremities spontaneously and purposefully splints to right and left forearm and right lower leg clean dry and intact. Significant swelling to RLE, and bilateral UE. Compartments soft and compressible  Neuro: CN II-XII grossly intact  Skin/wounds: scattered abrasions to limbs    Labs:  Renal:  Recent Labs     05/08/24  1208 05/09/24  0502 05/10/24  0442 05/11/24  0534   BUN 15.2 10.3 8.1* 5.8*   CREATININE 1.39* 0.82 0.76 0.68*     Recent Labs     05/08/24  1237 05/08/24  1451 05/08/24  1701 05/08/24  2339 05/09/24  0502   LACTIC 4.4* 4.4* 2.7* 4.2* 1.2     FEN/GI:  Recent Labs     " "05/08/24  1208 05/09/24  0502 05/10/24  0442 05/11/24  0534    138 135* 142   K 4.7 3.8 4.0 3.6   CO2 23 27 30* 27   CALCIUM 9.5 8.0* 7.6* 7.8*   MG  --  2.10  --   --    PHOS  --  3.0  --   --    ALBUMIN 4.6 4.0 3.2* 2.7*   BILITOT 0.7 0.9 0.6 0.5   * 73* 62* 69*   ALKPHOS 73 35* 33* 47   * 44 30 42     Heme:  Recent Labs     05/08/24  1208 05/09/24  0502 05/09/24  2102 05/10/24  0442 05/11/24  0534   HGB 15.9 8.5* 8.3* 6.5* 8.5*   HCT 45.9 25.1* 27.7* 19.3* 25.2*    187 190 154 128*   PTT 28.8  --   --   --   --    INR 1.1  --   --   --   --      ID:  Recent Labs     05/09/24  0502 05/09/24  2102 05/10/24  0442 05/11/24  0534   WBC 11.02 14.12* 10.85 6.88     CBG:  Recent Labs     05/08/24  1208 05/09/24  0502 05/10/24  0442 05/11/24  0534   GLUCOSE 183* 122* 136* 113*      No results for input(s): "POCTGLUCOSE" in the last 72 hours.   Cardiovascular:  No results for input(s): "TROPONINI", "CKTOTAL", "CKMB", "BNP" in the last 168 hours.  I have reviewed all pertinent lab results within the past 24 hours.    Imaging:  SURG FL Surgery Fluoro Usage   Final Result      SURG FL Surgery Fluoro Usage   Final Result      CT 3D RECON WITH INDEPENDENT WS   Final Result      Fractures of the femur, tibia, fibula and patella.         Electronically signed by: Hermila Kidd   Date:    05/08/2024   Time:    18:16      CT 3D RECON WITH INDEPENDENT WS   Final Result      Ankle and foot fractures as described.         Electronically signed by: Hermila Kidd   Date:    05/08/2024   Time:    18:22      X-Ray Hand 2 View Left   Final Result      Nondisplaced fracture of the 1st metacarpal.         Electronically signed by: Hermila Kidd   Date:    05/08/2024   Time:    16:47      X-Ray Wrist 2 View Right   Final Result      There is improved alignment of the distal radial fracture following reduction.         Electronically signed by: Hermila Kidd   Date:    05/08/2024   Time:    16:46      CT " 3D RECON WITHOUT INDEPENDENT WS   Final Result   FINDINGS/   3D reconstructions of the pelvis were created based on source data from accession number [id number].  Please see that report for details.         Electronically signed by: Jak Porter   Date:    05/08/2024   Time:    15:51      CT 3D RECON WITHOUT INDEPENDENT WS   Final Result   FINDINGS/   3D reconstructions of the right knee were created based on source data from accession number [id number]  . Please see that report for details.         Electronically signed by: Jak Porter   Date:    05/08/2024   Time:    15:50      CT 3D RECON WITHOUT INDEPENDENT WS   Final Result   FINDINGS/   3D reconstructions of the right ankle were created based on source data from accession number [id number]  . Please see that report for details.         Electronically signed by: Jka Porter   Date:    05/08/2024   Time:    15:50      X-Ray Foot Complete Right   Final Result      Fractures as above.         Electronically signed by: Rio Nicole   Date:    05/08/2024   Time:    14:54      X-Ray Forearm Right   Final Result      1. Displaced fracture of the distal radius.   2. Ulnar styloid process fracture.         Electronically signed by: Hermila Kidd   Date:    05/08/2024   Time:    15:24      X-Ray Femur 2 View Right   Final Result      Fracture as above with lipohemarthrosis.         Electronically signed by: Rio Nicole   Date:    05/08/2024   Time:    13:59      X-Ray Tibia Fibula 2 View Right   Final Result      As above.         Electronically signed by: Jak Porter   Date:    05/08/2024   Time:    14:49      CTA Runoff ABD PEL Bilat Lower Ext   Final Result      1. The right peroneal artery is poorly opacified at the level of the fibular fracture, I cannot exclude injury to this artery.  Otherwise widely patent right lower extremity arteries.   2. Left lower extremity arteries are widely patent.   3. Fractures of the right femur, patella, medial tibial  plateau and fibula as discussed.         Electronically signed by: Jak Porter   Date:    05/08/2024   Time:    13:39      CT Chest Abdomen Pelvis With IV Contrast (XPD) NO Oral Contrast   Final Result      Nondisplaced fracture of the superior and inferior pubic ramus on the right side at the level of the pubis symphysis.      Nondisplaced fracture of the acetabulum the right side      Mild ground-glass opacity in the superior segment of the right lower lobe medially which could represent a small area of pulmonary contusion      Nondisplaced left 7th rib fracture posteriorly      There are areas of linear hypoattenuation in the right kidney in the mid and lower pole.  There is no associated perinephric stranding.  No inflammatory changes are seen.  No subcapsular or retroperitoneal hematoma is seen.  These areas of linear hypoattenuation may represent junctional cortical defects but given the patient's history renal injury cannot be completely excluded.  Follow-up is recommended.         Electronically signed by: [place]   Date:    05/08/2024   Time:    13:08      CT Cervical Spine Without Contrast   Final Result      No acute fracture or malalignment identified.         Electronically signed by: Addy Zapata   Date:    05/08/2024   Time:    12:49      CT Head Without Contrast   Final Result      No acute intracranial findings identified.         Electronically signed by: Addy Zapata   Date:    05/08/2024   Time:    12:46      X-Ray Pelvis Routine AP   Final Result      As above.         Electronically signed by: Jak Porter   Date:    05/08/2024   Time:    12:57      X-Ray Chest 1 View   Final Result      No acute chest disease is identified.         Electronically signed by: Rio Nicole   Date:    05/08/2024   Time:    12:08         I have reviewed all pertinent imaging results/findings within the past 24 hours.    Micro/Path/Other:  Microbiology Results (last 7 days)       ** No results found for  the last 168 hours. **           Pathology Results  (Last 7 days)      None             Problems list:  Active Problem List with Overview Notes    Diagnosis Date Noted    Leukocytosis 05/08/2024    Fracture of right ulna 05/08/2024    Distal radius fracture, right 05/08/2024    Closed fracture of right foot 05/08/2024    Open right ankle fracture 05/08/2024    Closed fracture of right tibial plateau 05/08/2024    Closed fracture of right patella 05/08/2024    Other fracture of right femur, initial encounter for closed fracture 05/08/2024    Unspecified fracture of right acetabulum, initial encounter for closed fracture 05/08/2024    Closed fracture of pubic ramus 05/08/2024    DONAL (acute kidney injury) 05/08/2024    Lactic acidosis 05/08/2024    Pulmonary contusion 05/08/2024    Closed fracture of one rib of left side 05/08/2024    Closed fracture of shaft of right femur 05/08/2024        Assessment & Plan:   Closed fracture of right acetabulum  Closed fracture of posterior 7th  rib of left side  Closed fracture of pubic ramus   Closed fracture of right femur    Closed fracture of right patella   Closed fracture of right tibial plateau   Open right ankle fracture  Closed fracture of right foot  Distal radius fracture, right  Fracture of right ulna    S/p ancef   Multimodal pain control.    Orthopedics following  S/p IMR of right femur and fixation of right radius 5/9  Lovenox BID  Multimodal pain control.    WB trough elbows BUE   NWB RLE   Elevate extremities  Neurovascular checks   Trend CK q12   Optimize bowel regimen   DC ordaz         Pulmonary contusion  PT and OT once able.    Aggressive incentive spirometry.    On room air.     Anemia  S/p 2u 5/10 w/ appropriate response   Trend CBC     Lactic acidosis- resolved  1 L IV fluid bolus given.      DONAL (acute kidney injury)-resolved  Aggressive IV fluids.   Trend Bun/ CR      Leukocytosis- resolved  Likely reactive to trauma.    continue to monitor with daily  labs     Dispo: Pending clinical progress/ PT / OT        Tequila Mckinley [facility]   Trauma/Acute Care Surgery  [facility]  C: [phone]

## 2024-05-11 NOTE — PLAN OF CARE
Problem: Physical Therapy  Goal: Physical Therapy Goal  Description: Goals to be met by: 24     Patient will increase functional independence with mobility by performin. Supine to sit with SBA  2. Sit to supine with SBA  3. Bed to chair transfer Min A    Outcome: Progressing
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
## 2024-05-11 NOTE — PLAN OF CARE
Problem: Occupational Therapy  Goal: Occupational Therapy Goal  Description: Goals to be met by: 6/11/24     Patient will increase functional independence with ADLs by performing:    UE Dressing with Set-up Assistance.  LE Dressing with Minimal Assistance.  Grooming while seated at sink with Modified [place].  Toileting from bedside commode with Stand-by Assistance for hygiene and clothing management.   Toilet transfer to bedside commode with Stand-by Assistance using PF RW while adhering to pxns.    Outcome: Progressing

## 2024-05-12 LAB
ALBUMIN SERPL-MCNC: 2.9 G/DL (ref 3.5–5)
ALBUMIN/GLOB SERPL: 1.2 RATIO (ref 1.1–2)
ALP SERPL-CCNC: 80 UNIT/L (ref 40–150)
ALT SERPL-CCNC: 88 UNIT/L (ref 0–55)
AST SERPL-CCNC: 99 UNIT/L (ref 5–34)
BASOPHILS # BLD AUTO: 0.04 X10(3)/MCL
BASOPHILS NFR BLD AUTO: 0.6 %
BILIRUB SERPL-MCNC: 0.9 MG/DL
BUN SERPL-MCNC: 4.4 MG/DL (ref 8.9–20.6)
CALCIUM SERPL-MCNC: 8.1 MG/DL (ref 8.4–10.2)
CHLORIDE SERPL-SCNC: 106 MMOL/L (ref 98–107)
CK SERPL-CCNC: 3681 U/L (ref 30–200)
CK SERPL-CCNC: 4919 U/L (ref 30–200)
CO2 SERPL-SCNC: 27 MMOL/L (ref 22–29)
CREAT SERPL-MCNC: 0.63 MG/DL (ref 0.73–1.18)
EOSINOPHIL # BLD AUTO: 0.31 X10(3)/MCL (ref 0–0.9)
EOSINOPHIL NFR BLD AUTO: 4.9 %
ERYTHROCYTE [DISTWIDTH] IN BLOOD BY AUTOMATED COUNT: 12.8 % (ref 11.5–17)
GFR SERPLBLD CREATININE-BSD FMLA CKD-EPI: >60 ML/MIN/1.73/M2
GLOBULIN SER-MCNC: 2.4 GM/DL (ref 2.4–3.5)
GLUCOSE SERPL-MCNC: 128 MG/DL (ref 74–100)
HCT VFR BLD AUTO: 25.3 % (ref 42–52)
HGB BLD-MCNC: 8.5 G/DL (ref 14–18)
IMM GRANULOCYTES # BLD AUTO: 0.25 X10(3)/MCL (ref 0–0.04)
IMM GRANULOCYTES NFR BLD AUTO: 4 %
LYMPHOCYTES # BLD AUTO: 1.26 X10(3)/MCL (ref 0.6–4.6)
LYMPHOCYTES NFR BLD AUTO: 20 %
MCH RBC QN AUTO: 30.8 PG (ref 27–31)
MCHC RBC AUTO-ENTMCNC: 33.6 G/DL (ref 33–36)
MCV RBC AUTO: 91.7 FL (ref 80–94)
MONOCYTES # BLD AUTO: 0.46 X10(3)/MCL (ref 0.1–1.3)
MONOCYTES NFR BLD AUTO: 7.3 %
NEUTROPHILS # BLD AUTO: 3.98 X10(3)/MCL (ref 2.1–9.2)
NEUTROPHILS NFR BLD AUTO: 63.2 %
NRBC BLD AUTO-RTO: 0.8 %
PLATELET # BLD AUTO: 155 X10(3)/MCL (ref 130–400)
PMV BLD AUTO: 9.7 FL (ref 7.4–10.4)
POTASSIUM SERPL-SCNC: 3.5 MMOL/L (ref 3.5–5.1)
PROT SERPL-MCNC: 5.3 GM/DL (ref 6.4–8.3)
RBC # BLD AUTO: 2.76 X10(6)/MCL (ref 4.7–6.1)
SODIUM SERPL-SCNC: 141 MMOL/L (ref 136–145)
WBC # SPEC AUTO: 6.3 X10(3)/MCL (ref 4.5–11.5)

## 2024-05-12 PROCEDURE — 11000001 HC ACUTE MED/SURG PRIVATE ROOM

## 2024-05-12 PROCEDURE — 63600175 PHARM REV CODE 636 W HCPCS: Performed by: NURSE PRACTITIONER

## 2024-05-12 PROCEDURE — 25000003 PHARM REV CODE 250: Performed by: REHABILITATION UNIT

## 2024-05-12 PROCEDURE — 63600175 PHARM REV CODE 636 W HCPCS: Performed by: REHABILITATION UNIT

## 2024-05-12 PROCEDURE — 82550 ASSAY OF CK (CPK): CPT | Performed by: NURSE PRACTITIONER

## 2024-05-12 PROCEDURE — 80053 COMPREHEN METABOLIC PANEL: CPT | Performed by: REHABILITATION UNIT

## 2024-05-12 PROCEDURE — 99900035 HC TECH TIME PER 15 MIN (STAT)

## 2024-05-12 PROCEDURE — 99900031 HC PATIENT EDUCATION (STAT)

## 2024-05-12 PROCEDURE — 36415 COLL VENOUS BLD VENIPUNCTURE: CPT | Performed by: REHABILITATION UNIT

## 2024-05-12 PROCEDURE — 36415 COLL VENOUS BLD VENIPUNCTURE: CPT | Performed by: NURSE PRACTITIONER

## 2024-05-12 PROCEDURE — 94799 UNLISTED PULMONARY SVC/PX: CPT

## 2024-05-12 PROCEDURE — 85025 COMPLETE CBC W/AUTO DIFF WBC: CPT | Performed by: REHABILITATION UNIT

## 2024-05-12 RX ORDER — HYDROCORTISONE 1 %
CREAM (GRAM) TOPICAL 2 TIMES DAILY
Status: DISCONTINUED | OUTPATIENT
Start: 2024-05-12 | End: 2024-05-15 | Stop reason: HOSPADM

## 2024-05-12 RX ADMIN — ENOXAPARIN SODIUM 40 MG: 40 INJECTION SUBCUTANEOUS at 08:05

## 2024-05-12 RX ADMIN — MUPIROCIN: 20 OINTMENT TOPICAL at 08:05

## 2024-05-12 RX ADMIN — OXYCODONE HYDROCHLORIDE 10 MG: 10 TABLET ORAL at 10:05

## 2024-05-12 RX ADMIN — OXYCODONE HYDROCHLORIDE 10 MG: 10 TABLET ORAL at 02:05

## 2024-05-12 RX ADMIN — GABAPENTIN 300 MG: 300 CAPSULE ORAL at 02:05

## 2024-05-12 RX ADMIN — KETOROLAC TROMETHAMINE 30 MG: 30 INJECTION, SOLUTION INTRAMUSCULAR at 12:05

## 2024-05-12 RX ADMIN — ACETAMINOPHEN 650 MG: 325 TABLET, FILM COATED ORAL at 10:05

## 2024-05-12 RX ADMIN — ACETAMINOPHEN 650 MG: 325 TABLET, FILM COATED ORAL at 09:05

## 2024-05-12 RX ADMIN — CEFAZOLIN SODIUM 2 G: 2 SOLUTION INTRAVENOUS at 06:05

## 2024-05-12 RX ADMIN — OXYCODONE HYDROCHLORIDE 10 MG: 10 TABLET ORAL at 04:05

## 2024-05-12 RX ADMIN — METHOCARBAMOL 500 MG: 500 TABLET ORAL at 10:05

## 2024-05-12 RX ADMIN — ACETAMINOPHEN 650 MG: 325 TABLET, FILM COATED ORAL at 02:05

## 2024-05-12 RX ADMIN — KETOROLAC TROMETHAMINE 30 MG: 30 INJECTION, SOLUTION INTRAMUSCULAR at 05:05

## 2024-05-12 RX ADMIN — METHOCARBAMOL 500 MG: 500 TABLET ORAL at 05:05

## 2024-05-12 RX ADMIN — SODIUM CHLORIDE, POTASSIUM CHLORIDE, SODIUM LACTATE AND CALCIUM CHLORIDE: 600; 310; 30; 20 INJECTION, SOLUTION INTRAVENOUS at 07:05

## 2024-05-12 RX ADMIN — METHOCARBAMOL 500 MG: 500 TABLET ORAL at 02:05

## 2024-05-12 RX ADMIN — GABAPENTIN 300 MG: 300 CAPSULE ORAL at 08:05

## 2024-05-12 RX ADMIN — ACETAMINOPHEN 650 MG: 325 TABLET, FILM COATED ORAL at 05:05

## 2024-05-12 RX ADMIN — SODIUM CHLORIDE, POTASSIUM CHLORIDE, SODIUM LACTATE AND CALCIUM CHLORIDE: 600; 310; 30; 20 INJECTION, SOLUTION INTRAVENOUS at 04:05

## 2024-05-12 RX ADMIN — OXYCODONE HYDROCHLORIDE 10 MG: 10 TABLET ORAL at 08:05

## 2024-05-12 RX ADMIN — OXYCODONE HYDROCHLORIDE 10 MG: 10 TABLET ORAL at 12:05

## 2024-05-12 NOTE — PROGRESS NOTES
"   Trauma Surgery   Progress Note  Admit Date: 5/8/2024  HD#4  POD#3 Days Post-Op    Subjective:   Interval history:  AFVSS. No BM. Pain improving . CK remains elevated.    Home Meds: No current outpatient medications   Scheduled Meds:   acetaminophen  650 mg Oral Q4H    enoxparin  40 mg Subcutaneous Q12H    gabapentin  300 mg Oral TID    ketorolac  30 mg Intravenous Q6H    methocarbamoL  500 mg Oral Q8H    mupirocin   Nasal BID    polyethylene glycol  17 g Oral BID    senna-docusate 8.6-50 mg  2 tablet Oral BID     Continuous Infusions:   lactated ringers   Intravenous Continuous 150 mL/hr at 05/12/24 0402 New Bag at 05/12/24 0402     PRN Meds:  Current Facility-Administered Medications:     0.9%  NaCl infusion (for blood administration), , Intravenous, Q24H PRN    HYDROmorphone, 0.2 mg, Intravenous, Q6H PRN    magnesium hydroxide 400 mg/5 ml, 30 mL, Oral, Daily PRN    melatonin, 6 mg, Oral, Nightly PRN    ondansetron, 4 mg, Oral, Q6H PRN    oxyCODONE, 5 mg, Oral, Q4H PRN    oxyCODONE, 10 mg, Oral, Q4H PRN     Objective:     VITAL SIGNS: 24 HR MIN & MAX LAST   Temp  Min: 98.2 °F (36.8 °C)  Max: 98.8 °F (37.1 °C)  98.7 °F (37.1 °C)   BP  Min: 118/63  Max: 160/77  (!) 142/92    Pulse  Min: 87  Max: 105  89    Resp  Min: 18  Max: 20  20    SpO2  Min: 93 %  Max: 100 %  97 %      HT: 5' 3" (160 cm)  WT: 63.5 kg (140 lb)  BMI: 24.8     Intake/output:  Intake/Output - Last 3 Shifts         05/10 0700 05/11 0659 05/11 0700 05/12 0659 05/12 0700 05/13 0659    P.O.  960     I.V. (mL/kg) 7000 (110.2)      Blood 937.5      IV Piggyback 284      Total Intake(mL/kg) 8221.5 (129.5) 960 (15.1)     Urine (mL/kg/hr) 4250 (2.8) 2900 (1.9)     Stool  0     Total Output 4250 2900     Net +3971.5 -1940            Stool Occurrence  3 x             Intake/Output Summary (Last 24 hours) at 5/12/2024 1111  Last data filed at 5/12/2024 0411  Gross per 24 hour   Intake 960 ml   Output 2900 ml   Net -1940 ml         Lines/drains/airway:     " "  Peripheral IV - Single Lumen 05/08/24 1207 20 G Anterior;Distal;Left Upper Arm (Active)   Site Assessment Clean;Dry;Intact 05/10/24 0424   Extremity Assessment Distal to IV No abnormal discoloration 05/09/24 1845   Line Status Infusing 05/10/24 0424   Dressing Status Clean;Dry;Intact 05/10/24 0424   Dressing Intervention Integrity maintained 05/10/24 0424   Number of days: 1            Urethral Catheter 05/08/24 2123 Double-lumen;Non-latex;Silicone 16 Fr. (Active)   Site Assessment Clean;Intact 05/09/24 0800   Collection Container Urimeter 05/09/24 1910   Securement Method secured to top of thigh w/ adhesive device 05/09/24 1910   Catheter Care Performed yes 05/09/24 0800   Reason for Continuing Urinary Catheterization Post operative 05/09/24 1910   CAUTI Prevention Bundle Securement Device in place with 1" slack;Intact seal between catheter & drainage tubing;Drainage bag/urimeter off the floor;Sheeting clip in use;No dependent loops or kinks;Drainage bag/urimeter not overfilled (<2/3 full);Drainage bag/urimeter below bladder 05/09/24 0800   Output (mL) 400 mL 05/09/24 0011   Number of days: 1       Physical examination:  Gen: NAD, AAOx3, answering questions appropriately  HEENT: bruising to right superior eyelid, scattered abrasions to right temple,   CV: RR, right peripheral pulse detectable with doppler.  Resp: NWOB  Abd: S/NT/ND  Msk: moving all extremities spontaneously and purposefully splints to right and left forearm and right lower leg clean dry and intact. Significant swelling to RLE, and bilateral UE. Compartments soft and compressible  Neuro: CN II-XII grossly intact  Skin/wounds: scattered abrasions to limbs    Labs:  Renal:  Recent Labs     05/10/24  0442 05/11/24  0534 05/12/24 0422   BUN 8.1* 5.8* 4.4*   CREATININE 0.76 0.68* 0.63*     No results for input(s): "LACTIC" in the last 72 hours.    FEN/GI:  Recent Labs     05/10/24  0442 05/11/24  0534 05/12/24 0422   * 142 141   K 4.0 3.6 3.5 " "  CO2 30* 27 27   CALCIUM 7.6* 7.8* 8.1*   ALBUMIN 3.2* 2.7* 2.9*   BILITOT 0.6 0.5 0.9   AST 62* 69* 99*   ALKPHOS 33* 47 80   ALT 30 42 88*     Heme:  Recent Labs     05/09/24  2102 05/10/24  0442 05/11/24  0534 05/12/24  0422   HGB 8.3* 6.5* 8.5* 8.5*   HCT 27.7* 19.3* 25.2* 25.3*    154 128* 155     ID:  Recent Labs     05/09/24  2102 05/10/24  0442 05/11/24  0534 05/12/24  0422   WBC 14.12* 10.85 6.88 6.30     CBG:  Recent Labs     05/10/24  0442 05/11/24  0534 05/12/24  0422   GLUCOSE 136* 113* 128*      No results for input(s): "POCTGLUCOSE" in the last 72 hours.   Cardiovascular:  No results for input(s): "TROPONINI", "CKTOTAL", "CKMB", "BNP" in the last 168 hours.  I have reviewed all pertinent lab results within the past 24 hours.    Imaging:     I have reviewed all pertinent imaging results/findings within the past 24 hours.    Micro/Path/Other:  Microbiology Results (last 7 days)       ** No results found for the last 168 hours. **           Pathology Results  (Last 7 days)      None             Problems list:  Active Problem List with Overview Notes    Diagnosis Date Noted    Leukocytosis 05/08/2024    Fracture of right ulna 05/08/2024    Distal radius fracture, right 05/08/2024    Closed fracture of right foot 05/08/2024    Open right ankle fracture 05/08/2024    Closed fracture of right tibial plateau 05/08/2024    Closed fracture of right patella 05/08/2024    Other fracture of right femur, initial encounter for closed fracture 05/08/2024    Unspecified fracture of right acetabulum, initial encounter for closed fracture 05/08/2024    Closed fracture of pubic ramus 05/08/2024    DONAL (acute kidney injury) 05/08/2024    Lactic acidosis 05/08/2024    Pulmonary contusion 05/08/2024    Closed fracture of one rib of left side 05/08/2024    Closed fracture of shaft of right femur 05/08/2024        Assessment & Plan:   Closed fracture of right acetabulum  Closed fracture of posterior 7th  rib of left " side  Closed fracture of pubic ramus   Closed fracture of right femur    Closed fracture of right patella   Closed fracture of right tibial plateau   Open right ankle fracture  Closed fracture of right foot  Distal radius fracture, right  Fracture of right ulna    S/p ancef   Multimodal pain control.    Orthopedics following  S/p IMR of right femur and fixation of right radius 5/9  Lovenox BID  Multimodal pain control.    WB trough elbows BUE   NWB RLE   Elevate extremities  Neurovascular checks   Trend CK q12   Optimize bowel regimen   DC ordaz         Pulmonary contusion  PT and OT once able.    Aggressive incentive spirometry.    On room air.     Anemia  S/p 2u 5/10 w/ appropriate response   Trend CBC     Lactic acidosis- resolved  1 L IV fluid bolus given.      DONAL (acute kidney injury)-resolved  Aggressive IV fluids.   Trend Bun/ CR      Leukocytosis- resolved  Likely reactive to trauma.    continue to monitor with daily labs    CK elevated   CTM, trend CK  Continue IVF     Dispo: Rehab/ PT / OT
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
## 2024-05-13 LAB
ALBUMIN SERPL-MCNC: 3 G/DL (ref 3.5–5)
ALBUMIN/GLOB SERPL: 1.2 RATIO (ref 1.1–2)
ALP SERPL-CCNC: 87 UNIT/L (ref 40–150)
ALT SERPL-CCNC: 102 UNIT/L (ref 0–55)
AST SERPL-CCNC: 77 UNIT/L (ref 5–34)
BASOPHILS # BLD AUTO: 0.05 X10(3)/MCL
BASOPHILS NFR BLD AUTO: 0.6 %
BILIRUB SERPL-MCNC: 1.2 MG/DL
BUN SERPL-MCNC: 8 MG/DL (ref 8.9–20.6)
CALCIUM SERPL-MCNC: 8.3 MG/DL (ref 8.4–10.2)
CHLORIDE SERPL-SCNC: 107 MMOL/L (ref 98–107)
CK SERPL-CCNC: 2270 U/L (ref 30–200)
CK SERPL-CCNC: 3029 U/L (ref 30–200)
CO2 SERPL-SCNC: 26 MMOL/L (ref 22–29)
CREAT SERPL-MCNC: 0.65 MG/DL (ref 0.73–1.18)
CRP SERPL-MCNC: 27.4 MG/L
EOSINOPHIL # BLD AUTO: 0.4 X10(3)/MCL (ref 0–0.9)
EOSINOPHIL NFR BLD AUTO: 4.6 %
ERYTHROCYTE [DISTWIDTH] IN BLOOD BY AUTOMATED COUNT: 12.5 % (ref 11.5–17)
GFR SERPLBLD CREATININE-BSD FMLA CKD-EPI: >60 ML/MIN/1.73/M2
GLOBULIN SER-MCNC: 2.5 GM/DL (ref 2.4–3.5)
GLUCOSE SERPL-MCNC: 104 MG/DL (ref 74–100)
HCT VFR BLD AUTO: 26 % (ref 42–52)
HGB BLD-MCNC: 8.4 G/DL (ref 14–18)
IMM GRANULOCYTES # BLD AUTO: 0.4 X10(3)/MCL (ref 0–0.04)
IMM GRANULOCYTES NFR BLD AUTO: 4.6 %
LYMPHOCYTES # BLD AUTO: 1.25 X10(3)/MCL (ref 0.6–4.6)
LYMPHOCYTES NFR BLD AUTO: 14.3 %
MCH RBC QN AUTO: 29.9 PG (ref 27–31)
MCHC RBC AUTO-ENTMCNC: 32.3 G/DL (ref 33–36)
MCV RBC AUTO: 92.5 FL (ref 80–94)
MONOCYTES # BLD AUTO: 0.71 X10(3)/MCL (ref 0.1–1.3)
MONOCYTES NFR BLD AUTO: 8.1 %
NEUTROPHILS # BLD AUTO: 5.93 X10(3)/MCL (ref 2.1–9.2)
NEUTROPHILS NFR BLD AUTO: 67.8 %
NRBC BLD AUTO-RTO: 0.7 %
PLATELET # BLD AUTO: 187 X10(3)/MCL (ref 130–400)
PMV BLD AUTO: 9.3 FL (ref 7.4–10.4)
POTASSIUM SERPL-SCNC: 4.1 MMOL/L (ref 3.5–5.1)
PREALB SERPL-MCNC: 16.4 MG/DL (ref 18–45)
PROT SERPL-MCNC: 5.5 GM/DL (ref 6.4–8.3)
RBC # BLD AUTO: 2.81 X10(6)/MCL (ref 4.7–6.1)
SODIUM SERPL-SCNC: 139 MMOL/L (ref 136–145)
WBC # SPEC AUTO: 8.74 X10(3)/MCL (ref 4.5–11.5)

## 2024-05-13 PROCEDURE — 99232 SBSQ HOSP IP/OBS MODERATE 35: CPT | Mod: ,,, | Performed by: SURGERY

## 2024-05-13 PROCEDURE — 82550 ASSAY OF CK (CPK): CPT | Performed by: NURSE PRACTITIONER

## 2024-05-13 PROCEDURE — 85025 COMPLETE CBC W/AUTO DIFF WBC: CPT | Performed by: NURSE PRACTITIONER

## 2024-05-13 PROCEDURE — 80053 COMPREHEN METABOLIC PANEL: CPT | Performed by: NURSE PRACTITIONER

## 2024-05-13 PROCEDURE — 94799 UNLISTED PULMONARY SVC/PX: CPT

## 2024-05-13 PROCEDURE — 25000003 PHARM REV CODE 250: Performed by: REHABILITATION UNIT

## 2024-05-13 PROCEDURE — 11000001 HC ACUTE MED/SURG PRIVATE ROOM

## 2024-05-13 PROCEDURE — 25000003 PHARM REV CODE 250: Performed by: NURSE PRACTITIONER

## 2024-05-13 PROCEDURE — 84134 ASSAY OF PREALBUMIN: CPT | Performed by: NURSE PRACTITIONER

## 2024-05-13 PROCEDURE — 97535 SELF CARE MNGMENT TRAINING: CPT | Mod: CO

## 2024-05-13 PROCEDURE — 86140 C-REACTIVE PROTEIN: CPT | Performed by: NURSE PRACTITIONER

## 2024-05-13 PROCEDURE — 63600175 PHARM REV CODE 636 W HCPCS: Performed by: NURSE PRACTITIONER

## 2024-05-13 PROCEDURE — 99900035 HC TECH TIME PER 15 MIN (STAT)

## 2024-05-13 PROCEDURE — 99900031 HC PATIENT EDUCATION (STAT)

## 2024-05-13 PROCEDURE — 63600175 PHARM REV CODE 636 W HCPCS: Performed by: REHABILITATION UNIT

## 2024-05-13 PROCEDURE — 94760 N-INVAS EAR/PLS OXIMETRY 1: CPT

## 2024-05-13 PROCEDURE — 36415 COLL VENOUS BLD VENIPUNCTURE: CPT | Performed by: NURSE PRACTITIONER

## 2024-05-13 PROCEDURE — 97530 THERAPEUTIC ACTIVITIES: CPT | Mod: CQ

## 2024-05-13 PROCEDURE — 25000003 PHARM REV CODE 250

## 2024-05-13 PROCEDURE — 97530 THERAPEUTIC ACTIVITIES: CPT | Mod: CO

## 2024-05-13 RX ADMIN — HYDROCORTISONE: 1 CREAM TOPICAL at 08:05

## 2024-05-13 RX ADMIN — METHOCARBAMOL 500 MG: 500 TABLET ORAL at 09:05

## 2024-05-13 RX ADMIN — ENOXAPARIN SODIUM 40 MG: 40 INJECTION SUBCUTANEOUS at 08:05

## 2024-05-13 RX ADMIN — ENOXAPARIN SODIUM 40 MG: 40 INJECTION SUBCUTANEOUS at 09:05

## 2024-05-13 RX ADMIN — METHOCARBAMOL 500 MG: 500 TABLET ORAL at 02:05

## 2024-05-13 RX ADMIN — GABAPENTIN 300 MG: 300 CAPSULE ORAL at 08:05

## 2024-05-13 RX ADMIN — OXYCODONE HYDROCHLORIDE 10 MG: 10 TABLET ORAL at 09:05

## 2024-05-13 RX ADMIN — SENNOSIDES AND DOCUSATE SODIUM 2 TABLET: 8.6; 5 TABLET ORAL at 08:05

## 2024-05-13 RX ADMIN — HYDROCORTISONE: 1 CREAM TOPICAL at 09:05

## 2024-05-13 RX ADMIN — SODIUM CHLORIDE, POTASSIUM CHLORIDE, SODIUM LACTATE AND CALCIUM CHLORIDE: 600; 310; 30; 20 INJECTION, SOLUTION INTRAVENOUS at 10:05

## 2024-05-13 RX ADMIN — OXYCODONE HYDROCHLORIDE 10 MG: 10 TABLET ORAL at 08:05

## 2024-05-13 RX ADMIN — KETOROLAC TROMETHAMINE 30 MG: 30 INJECTION, SOLUTION INTRAMUSCULAR at 05:05

## 2024-05-13 RX ADMIN — KETOROLAC TROMETHAMINE 30 MG: 30 INJECTION, SOLUTION INTRAMUSCULAR at 12:05

## 2024-05-13 RX ADMIN — ACETAMINOPHEN 325 MG: 325 TABLET, FILM COATED ORAL at 05:05

## 2024-05-13 RX ADMIN — GABAPENTIN 300 MG: 300 CAPSULE ORAL at 02:05

## 2024-05-13 RX ADMIN — POLYETHYLENE GLYCOL 3350 17 G: 17 POWDER, FOR SOLUTION ORAL at 08:05

## 2024-05-13 RX ADMIN — MUPIROCIN: 20 OINTMENT TOPICAL at 08:05

## 2024-05-13 RX ADMIN — METHOCARBAMOL 500 MG: 500 TABLET ORAL at 05:05

## 2024-05-13 RX ADMIN — OXYCODONE HYDROCHLORIDE 10 MG: 10 TABLET ORAL at 04:05

## 2024-05-13 RX ADMIN — GABAPENTIN 300 MG: 300 CAPSULE ORAL at 09:05

## 2024-05-13 RX ADMIN — OXYCODONE HYDROCHLORIDE 10 MG: 10 TABLET ORAL at 12:05

## 2024-05-13 NOTE — PLAN OF CARE
Met with pt's mother to discuss rehab placement and provided list of facilities. Requested referral be sent to [facility] and [facility] (preference in that order). Referrals sent to both facilities via [facility].     Neva Kramer LCSW    [id number] Received update from Svtea at [facility] stating they have clinically accepted pt and they will submit for auth.
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
## 2024-05-13 NOTE — PROGRESS NOTES
"   Trauma Surgery   Progress Note  Admit Date: 5/8/2024  HD#5  POD#4 Days Post-Op    Subjective:   Interval history:  AFVSS. BM this morning. Pain improving . CK remains elevated, down trending. Denies fever, chills, shortness of breath, or chest pain. Currently unable to feed himself. To work with OT later today.    Home Meds: No current outpatient medications   Scheduled Meds:   acetaminophen  650 mg Oral Q4H    enoxparin  40 mg Subcutaneous Q12H    gabapentin  300 mg Oral TID    hydrocortisone   Topical (Top) BID    methocarbamoL  500 mg Oral Q8H    mupirocin   Nasal BID    polyethylene glycol  17 g Oral BID    senna-docusate 8.6-50 mg  2 tablet Oral BID     Continuous Infusions:   lactated ringers   Intravenous Continuous 150 mL/hr at 05/12/24 1900 New Bag at 05/12/24 1900     PRN Meds:  Current Facility-Administered Medications:     0.9%  NaCl infusion (for blood administration), , Intravenous, Q24H PRN    HYDROmorphone, 0.2 mg, Intravenous, Q6H PRN    magnesium hydroxide 400 mg/5 ml, 30 mL, Oral, Daily PRN    melatonin, 6 mg, Oral, Nightly PRN    ondansetron, 4 mg, Oral, Q6H PRN    oxyCODONE, 5 mg, Oral, Q4H PRN    oxyCODONE, 10 mg, Oral, Q4H PRN     Objective:     VITAL SIGNS: 24 HR MIN & MAX LAST   Temp  Min: 98.5 °F (36.9 °C)  Max: 99.4 °F (37.4 °C)  99.4 °F (37.4 °C)   BP  Min: 118/66  Max: 142/92  125/78    Pulse  Min: 78  Max: 93  90    Resp  Min: 17  Max: 20  18    SpO2  Min: 91 %  Max: 98 %  98 %      HT: 5' 3" (160 cm)  WT: 63.5 kg (140 lb)  BMI: 24.8     Intake/output:  Intake/Output - Last 3 Shifts         05/11 0700  05/12 0659 05/12 0700 05/13 0659 05/13 0700  05/14 0659    P.O. 960 900     I.V. (mL/kg)       Blood       IV Piggyback       Total Intake(mL/kg) 960 (15.1) 900 (14.2)     Urine (mL/kg/hr) 2900 (1.9) 4075 (2.7)     Stool 0 0     Total Output 2900 4075     Net -1940 -3175            Stool Occurrence 3 x 6 x             Intake/Output Summary (Last 24 hours) at 5/13/2024 0721  Last data " "filed at 5/13/2024 0619  Gross per 24 hour   Intake 900 ml   Output 4075 ml   Net -3175 ml         Lines/drains/airway:       Peripheral IV - Single Lumen 05/08/24 1207 20 G Anterior;Distal;Left Upper Arm (Active)   Site Assessment Clean;Dry;Intact 05/10/24 0424   Extremity Assessment Distal to IV No abnormal discoloration 05/09/24 1845   Line Status Infusing 05/10/24 0424   Dressing Status Clean;Dry;Intact 05/10/24 0424   Dressing Intervention Integrity maintained 05/10/24 0424   Number of days: 1            Urethral Catheter 05/08/24 2123 Double-lumen;Non-latex;Silicone 16 Fr. (Active)   Site Assessment Clean;Intact 05/09/24 0800   Collection Container Urimeter 05/09/24 1910   Securement Method secured to top of thigh w/ adhesive device 05/09/24 1910   Catheter Care Performed yes 05/09/24 0800   Reason for Continuing Urinary Catheterization Post operative 05/09/24 1910   CAUTI Prevention Bundle Securement Device in place with 1" slack;Intact seal between catheter & drainage tubing;Drainage bag/urimeter off the floor;Sheeting clip in use;No dependent loops or kinks;Drainage bag/urimeter not overfilled (<2/3 full);Drainage bag/urimeter below bladder 05/09/24 0800   Output (mL) 400 mL 05/09/24 0011   Number of days: 1       Physical examination:  Gen: NAD, AAOx3, answering questions appropriately  HEENT: bruising to right superior eyelid, scattered abrasions to right temple,   CV: RR, right peripheral pulse detectable with doppler.  Resp: NWOB  Abd: S/NT/ND  Msk: moving all extremities spontaneously and purposefully splints to right and left forearm and right lower leg clean dry and intact. Significant swelling to RLE, and bilateral UE. Compartments soft and compressible  Neuro: CN II-XII grossly intact  Skin/wounds: scattered abrasions to limbs    Labs:  Renal:  Recent Labs     05/11/24  0534 05/12/24  0422 05/13/24  0437   BUN 5.8* 4.4* 8.0*   CREATININE 0.68* 0.63* 0.65*     No results for input(s): "LACTIC" in " "the last 72 hours.    FEN/GI:  Recent Labs     05/11/24  0534 05/12/24 0422 05/13/24 0437    141 139   K 3.6 3.5 4.1   CO2 27 27 26   CALCIUM 7.8* 8.1* 8.3*   ALBUMIN 2.7* 2.9* 3.0*   BILITOT 0.5 0.9 1.2   AST 69* 99* 77*   ALKPHOS 47 80 87   ALT 42 88* 102*     Heme:  Recent Labs     05/11/24  0534 05/12/24 0422 05/13/24 0437   HGB 8.5* 8.5* 8.4*   HCT 25.2* 25.3* 26.0*   * 155 187     ID:  Recent Labs     05/11/24 0534 05/12/24 0422 05/13/24 0437   WBC 6.88 6.30 8.74     CBG:  Recent Labs     05/11/24 0534 05/12/24 0422 05/13/24 0437   GLUCOSE 113* 128* 104*      No results for input(s): "POCTGLUCOSE" in the last 72 hours.   Cardiovascular:  No results for input(s): "TROPONINI", "CKTOTAL", "CKMB", "BNP" in the last 168 hours.  I have reviewed all pertinent lab results within the past 24 hours.    Imaging:     I have reviewed all pertinent imaging results/findings within the past 24 hours.    Micro/Path/Other:  Microbiology Results (last 7 days)       ** No results found for the last 168 hours. **           Pathology Results  (Last 7 days)      None             Problems list:  Active Problem List with Overview Notes    Diagnosis Date Noted    Leukocytosis 05/08/2024    Fracture of right ulna 05/08/2024    Distal radius fracture, right 05/08/2024    Closed fracture of right foot 05/08/2024    Open right ankle fracture 05/08/2024    Closed fracture of right tibial plateau 05/08/2024    Closed fracture of right patella 05/08/2024    Other fracture of right femur, initial encounter for closed fracture 05/08/2024    Unspecified fracture of right acetabulum, initial encounter for closed fracture 05/08/2024    Closed fracture of pubic ramus 05/08/2024    DONAL (acute kidney injury) 05/08/2024    Lactic acidosis 05/08/2024    Pulmonary contusion 05/08/2024    Closed fracture of one rib of left side 05/08/2024    Closed fracture of shaft of right femur 05/08/2024        Assessment & Plan:   Closed " fracture of right acetabulum  Closed fracture of posterior 7th  rib of left side  Closed fracture of pubic ramus   Closed fracture of right femur    Closed fracture of right patella   Closed fracture of right tibial plateau   Open right ankle fracture  Closed fracture of right foot  Distal radius fracture, right  Fracture of right ulna    S/p ancef   Multimodal pain control.    Orthopedics following  S/p IMR of right femur and fixation of right radius 5/9  Lovenox BID  WB trough elbows BUE   NWB RLE   Elevate extremities  Neurovascular checks   Optimize bowel regimen          Pulmonary contusion  PT and OT once able.    Aggressive incentive spirometry.    On room air.     Anemia  S/p 2u 5/10 w/ appropriate response   Trend CBC     Lactic acidosis- resolved  1 L IV fluid bolus given.      DONAL (acute kidney injury)-resolved  Aggressive IV fluids.   Trend Bun/ CR      Leukocytosis- resolved  Likely reactive to trauma.    continue to monitor with daily labs    CK elevated- improving  CTM, trend CK q12h  Continue IVF     Dispo: Rehab/ PT / OT
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
## 2024-05-14 LAB — CK SERPL-CCNC: 1646 U/L (ref 30–200)

## 2024-05-14 PROCEDURE — 99900035 HC TECH TIME PER 15 MIN (STAT)

## 2024-05-14 PROCEDURE — 63600175 PHARM REV CODE 636 W HCPCS: Performed by: NURSE PRACTITIONER

## 2024-05-14 PROCEDURE — 99900031 HC PATIENT EDUCATION (STAT)

## 2024-05-14 PROCEDURE — 94760 N-INVAS EAR/PLS OXIMETRY 1: CPT

## 2024-05-14 PROCEDURE — 82550 ASSAY OF CK (CPK): CPT | Performed by: NURSE PRACTITIONER

## 2024-05-14 PROCEDURE — 97530 THERAPEUTIC ACTIVITIES: CPT | Mod: CO

## 2024-05-14 PROCEDURE — 97530 THERAPEUTIC ACTIVITIES: CPT | Mod: CQ

## 2024-05-14 PROCEDURE — 36415 COLL VENOUS BLD VENIPUNCTURE: CPT | Performed by: NURSE PRACTITIONER

## 2024-05-14 PROCEDURE — 11000001 HC ACUTE MED/SURG PRIVATE ROOM

## 2024-05-14 PROCEDURE — 25000003 PHARM REV CODE 250: Performed by: REHABILITATION UNIT

## 2024-05-14 PROCEDURE — 97116 GAIT TRAINING THERAPY: CPT | Mod: CQ

## 2024-05-14 PROCEDURE — 94799 UNLISTED PULMONARY SVC/PX: CPT

## 2024-05-14 PROCEDURE — 63600175 PHARM REV CODE 636 W HCPCS: Performed by: REHABILITATION UNIT

## 2024-05-14 PROCEDURE — 97535 SELF CARE MNGMENT TRAINING: CPT | Mod: CO

## 2024-05-14 PROCEDURE — 25000003 PHARM REV CODE 250: Performed by: NURSE PRACTITIONER

## 2024-05-14 RX ADMIN — OXYCODONE HYDROCHLORIDE 10 MG: 10 TABLET ORAL at 05:05

## 2024-05-14 RX ADMIN — GABAPENTIN 300 MG: 300 CAPSULE ORAL at 08:05

## 2024-05-14 RX ADMIN — OXYCODONE HYDROCHLORIDE 10 MG: 10 TABLET ORAL at 10:05

## 2024-05-14 RX ADMIN — OXYCODONE HYDROCHLORIDE 10 MG: 10 TABLET ORAL at 01:05

## 2024-05-14 RX ADMIN — SENNOSIDES AND DOCUSATE SODIUM 2 TABLET: 8.6; 5 TABLET ORAL at 08:05

## 2024-05-14 RX ADMIN — OXYCODONE HYDROCHLORIDE 10 MG: 10 TABLET ORAL at 02:05

## 2024-05-14 RX ADMIN — ENOXAPARIN SODIUM 40 MG: 40 INJECTION SUBCUTANEOUS at 08:05

## 2024-05-14 RX ADMIN — METHOCARBAMOL 500 MG: 500 TABLET ORAL at 08:05

## 2024-05-14 RX ADMIN — SODIUM CHLORIDE, POTASSIUM CHLORIDE, SODIUM LACTATE AND CALCIUM CHLORIDE: 600; 310; 30; 20 INJECTION, SOLUTION INTRAVENOUS at 04:05

## 2024-05-14 RX ADMIN — METHOCARBAMOL 500 MG: 500 TABLET ORAL at 05:05

## 2024-05-14 RX ADMIN — OXYCODONE HYDROCHLORIDE 10 MG: 10 TABLET ORAL at 06:05

## 2024-05-14 RX ADMIN — HYDROCORTISONE: 1 CREAM TOPICAL at 08:05

## 2024-05-14 RX ADMIN — METHOCARBAMOL 500 MG: 500 TABLET ORAL at 02:05

## 2024-05-14 RX ADMIN — POLYETHYLENE GLYCOL 3350 17 G: 17 POWDER, FOR SOLUTION ORAL at 08:05

## 2024-05-14 RX ADMIN — OXYCODONE HYDROCHLORIDE 10 MG: 10 TABLET ORAL at 09:05

## 2024-05-14 RX ADMIN — GABAPENTIN 300 MG: 300 CAPSULE ORAL at 02:05
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
## 2024-05-14 NOTE — PROGRESS NOTES
Inpatient Nutrition Evaluation    Admit Date: 5/8/2024   Total duration of encounter: 6 days   Patient Age: 24 y.o.    Nutrition Recommendation/Prescription     Continue regular diet as tolerated  Trial Boost BID to provide 240 kcal and 10 g protein per serving  Assist with feeds as needed  Monitor labs, intake and weight    Nutrition Assessment     Chart Review    Reason Seen: length of stay    Malnutrition Screening Tool Results   Have you recently lost weight without trying?: No  Have you been eating poorly because of a decreased appetite?: Yes   MST Score: 1   Diagnosis:  s/p IMR of right femur and fixation of right radius 5/9   Closed fracture of right acetabulum  Closed fracture of posterior 7th  rib of left side  Closed fracture of pubic ramus   Closed fracture of right femur    Closed fracture of right patella   Closed fracture of right tibial plateau   Open right ankle fracture  Closed fracture of right foot  Distal radius fracture, right  Fracture of right ulna  Pulmonary contusion  Anemia   CK elevated- improving    Relevant Medical History: n/a    Scheduled Medications:  enoxparin, 40 mg, Q12H  gabapentin, 300 mg, TID  hydrocortisone, , BID  methocarbamoL, 500 mg, Q8H  polyethylene glycol, 17 g, BID  senna-docusate 8.6-50 mg, 2 tablet, BID    Continuous Infusions:  lactated ringers, Last Rate: 150 mL/hr at 05/14/24 0632    PRN Medications:   Current Facility-Administered Medications:     0.9%  NaCl infusion (for blood administration), , Intravenous, Q24H PRN    HYDROmorphone, 0.2 mg, Intravenous, Q6H PRN    magnesium hydroxide 400 mg/5 ml, 30 mL, Oral, Daily PRN    melatonin, 6 mg, Oral, Nightly PRN    ondansetron, 4 mg, Oral, Q6H PRN    oxyCODONE, 5 mg, Oral, Q4H PRN    oxyCODONE, 10 mg, Oral, Q4H PRN    Recent Labs   Lab 05/08/24  1208 05/09/24  0502 05/09/24  2102 05/10/24  0442 05/11/24  0534 05/12/24  0422 05/13/24  0436 05/13/24  0437    138  --  135* 142 141  --  139   K 4.7 3.8  --  4.0 3.6  "3.5  --  4.1   CALCIUM 9.5 8.0*  --  7.6* 7.8* 8.1*  --  8.3*   PHOS  --  3.0  --   --   --   --   --   --    MG  --  2.10  --   --   --   --   --   --    CHLORIDE 102 107  --  102 108* 106  --  107   CO2 23 27  --  30* 27 27  --  26   BUN 15.2 10.3  --  8.1* 5.8* 4.4*  --  8.0*   CREATININE 1.39* 0.82  --  0.76 0.68* 0.63*  --  0.65*   EGFRNORACEVR >60 >60  --  >60 >60 >60  --  >60   GLUCOSE 183* 122*  --  136* 113* 128*  --  104*   BILITOT 0.7 0.9  --  0.6 0.5 0.9  --  1.2   ALKPHOS 73 35*  --  33* 47 80  --  87   * 44  --  30 42 88*  --  102*   * 73*  --  62* 69* 99*  --  77*   ALBUMIN 4.6 4.0  --  3.2* 2.7* 2.9*  --  3.0*   PREALB  --  19.6  --   --   --   --  16.4*  --    CRP  --  89.40*  --   --   --   --   --  27.40*   WBC 30.36  30.36* 11.02 14.12* 10.85 6.88 6.30  --  8.74   HGB 15.9 8.5* 8.3* 6.5* 8.5* 8.5*  --  8.4*   HCT 45.9 25.1* 27.7* 19.3* 25.2* 25.3*  --  26.0*     Nutrition Orders:  Diet Adult Regular      Appetite/Oral Intake: fair/50-75% of meals  Factors Affecting Nutritional Intake: inability to feed self  Food/Congregation/Cultural Preferences: unable to obtain  Food Allergies: no known food allergies  Last Bowel Movement: 05/13/24  Wound(s):      Comments    5/14: Pt working with PT at time of visit. No reports of GI complaints. Per notes, pt with inability to feed self, assist with feeds. Per MST, pt with decreased appetite PTA, denies unintentional weight loss. Will trial ONS BID.    Anthropometrics    Height: 5' 3" (160 cm), Height Method: Stated  Last Weight: 63.5 kg (140 lb) (05/09/24 0412), Weight Method: Bed Scale  BMI (Calculated): 24.8  BMI Classification: normal (BMI 18.5-24.9)        Ideal Body Weight (IBW), Male: 124 lb     % Ideal Body Weight, Male (lb): 112.9 %                          Usual Weight Provided By: unable to obtain usual weight    Wt Readings from Last 5 Encounters:   05/09/24 63.5 kg (140 lb)     Weight Change(s) Since Admission:   Wt Readings from Last " 1 Encounters:   05/09/24 0412 63.5 kg (140 lb)   05/08/24 1151 63.5 kg (140 lb)   Admit Weight: 63.5 kg (140 lb) (05/08/24 1151), Weight Method: Stated    Patient Education     Not applicable.    Nutrition Goals & Monitoring     Dietitian will monitor: food and beverage intake, weight, electrolyte/renal panel, glucose/endocrine profile, and gastrointestinal profile    Nutrition Risk/Follow-Up: low (follow-up in 5-7 days)  Patients assigned 'low nutrition risk' status do not qualify for a full nutritional assessment but will be monitored and re-evaluated in a 5-7 day time period. Please consult if re-evaluation needed sooner.
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
## 2024-05-14 NOTE — PROGRESS NOTES
"   Trauma Surgery   Progress Note  Admit Date: 5/8/2024  HD#6  POD#5 Days Post-Op    Subjective:   Interval history:  NAEON. AFVSS. Having BM. Pain improving .   CK remains elevated, but down trending.   Denies fever, chills, chest pain or shortness of breath.   Currently unable to feed himself. Tolerating PT.    Home Meds: No current outpatient medications   Scheduled Meds:   enoxparin  40 mg Subcutaneous Q12H    gabapentin  300 mg Oral TID    hydrocortisone   Topical (Top) BID    methocarbamoL  500 mg Oral Q8H    polyethylene glycol  17 g Oral BID    senna-docusate 8.6-50 mg  2 tablet Oral BID     Continuous Infusions:   lactated ringers   Intravenous Continuous 150 mL/hr at 05/14/24 0444 New Bag at 05/14/24 0444     PRN Meds:  Current Facility-Administered Medications:     0.9%  NaCl infusion (for blood administration), , Intravenous, Q24H PRN    HYDROmorphone, 0.2 mg, Intravenous, Q6H PRN    magnesium hydroxide 400 mg/5 ml, 30 mL, Oral, Daily PRN    melatonin, 6 mg, Oral, Nightly PRN    ondansetron, 4 mg, Oral, Q6H PRN    oxyCODONE, 5 mg, Oral, Q4H PRN    oxyCODONE, 10 mg, Oral, Q4H PRN     Objective:     VITAL SIGNS: 24 HR MIN & MAX LAST   Temp  Min: 98.6 °F (37 °C)  Max: 100.8 °F (38.2 °C)  99.5 °F (37.5 °C)   BP  Min: 125/78  Max: 150/86  137/78    Pulse  Min: 83  Max: 112  106    Resp  Min: 16  Max: 18  18    SpO2  Min: 96 %  Max: 100 %  97 %      HT: 5' 3" (160 cm)  WT: 63.5 kg (140 lb)  BMI: 24.8     Intake/output:  Intake/Output - Last 3 Shifts         05/12 0700  05/13 0659 05/13 0700  05/14 0659    P.O. 900 240    Total Intake(mL/kg) 900 (14.2) 240 (3.8)    Urine (mL/kg/hr) 4075 (2.7) 400 (0.3)    Stool 0     Total Output 4075 400    Net -3175 -160          Urine Occurrence  1400 x    Stool Occurrence 6 x 1 x            Intake/Output Summary (Last 24 hours) at 5/14/2024 0552  Last data filed at 5/13/2024 2356  Gross per 24 hour   Intake 440 ml   Output 400 ml   Net 40 ml         Lines/drains/airway:     " "  Peripheral IV - Single Lumen 05/08/24 1207 20 G Anterior;Distal;Left Upper Arm (Active)   Site Assessment Clean;Dry;Intact 05/10/24 0424   Extremity Assessment Distal to IV No abnormal discoloration 05/09/24 1845   Line Status Infusing 05/10/24 0424   Dressing Status Clean;Dry;Intact 05/10/24 0424   Dressing Intervention Integrity maintained 05/10/24 0424   Number of days: 1            Urethral Catheter 05/08/24 2123 Double-lumen;Non-latex;Silicone 16 Fr. (Active)   Site Assessment Clean;Intact 05/09/24 0800   Collection Container Urimeter 05/09/24 1910   Securement Method secured to top of thigh w/ adhesive device 05/09/24 1910   Catheter Care Performed yes 05/09/24 0800   Reason for Continuing Urinary Catheterization Post operative 05/09/24 1910   CAUTI Prevention Bundle Securement Device in place with 1" slack;Intact seal between catheter & drainage tubing;Drainage bag/urimeter off the floor;Sheeting clip in use;No dependent loops or kinks;Drainage bag/urimeter not overfilled (<2/3 full);Drainage bag/urimeter below bladder 05/09/24 0800   Output (mL) 400 mL 05/09/24 0011   Number of days: 1       Physical examination:  Gen: NAD, AAOx3, answering questions appropriately  HEENT: bruising to right superior eyelid, scattered abrasions to right temple,   CV: RR, right peripheral pulse detectable with doppler.  Resp: NWOB  Abd: S/NT/ND  Msk: moving all extremities spontaneously and purposefully splints to right and left forearm and right lower leg clean dry and intact. Significant swelling to RLE, and bilateral UE. Compartments soft and compressible  Neuro: CN II-XII grossly intact  Skin/wounds: scattered abrasions to limbs    Labs:  Renal:  Recent Labs     05/12/24  0422 05/13/24 0437   BUN 4.4* 8.0*   CREATININE 0.63* 0.65*     No results for input(s): "LACTIC" in the last 72 hours.    FEN/GI:  Recent Labs     05/12/24 0422 05/13/24 0437    139   K 3.5 4.1   CO2 27 26   CALCIUM 8.1* 8.3*   ALBUMIN 2.9* " "3.0*   BILITOT 0.9 1.2   AST 99* 77*   ALKPHOS 80 87   ALT 88* 102*     Heme:  Recent Labs     05/12/24 0422 05/13/24 0437   HGB 8.5* 8.4*   HCT 25.3* 26.0*    187     ID:  Recent Labs     05/12/24 0422 05/13/24 0437   WBC 6.30 8.74     CBG:  Recent Labs     05/12/24 0422 05/13/24 0437   GLUCOSE 128* 104*      No results for input(s): "POCTGLUCOSE" in the last 72 hours.   Cardiovascular:  No results for input(s): "TROPONINI", "CKTOTAL", "CKMB", "BNP" in the last 168 hours.  I have reviewed all pertinent lab results within the past 24 hours.    Imaging:     I have reviewed all pertinent imaging results/findings within the past 24 hours.    Micro/Path/Other:  Microbiology Results (last 7 days)       ** No results found for the last 168 hours. **           Pathology Results  (Last 7 days)      None             Problems list:  Active Problem List with Overview Notes    Diagnosis Date Noted    Leukocytosis 05/08/2024    Fracture of right ulna 05/08/2024    Distal radius fracture, right 05/08/2024    Closed fracture of right foot 05/08/2024    Open right ankle fracture 05/08/2024    Closed fracture of right tibial plateau 05/08/2024    Closed fracture of right patella 05/08/2024    Other fracture of right femur, initial encounter for closed fracture 05/08/2024    Unspecified fracture of right acetabulum, initial encounter for closed fracture 05/08/2024    Closed fracture of pubic ramus 05/08/2024    DONAL (acute kidney injury) 05/08/2024    Lactic acidosis 05/08/2024    Pulmonary contusion 05/08/2024    Closed fracture of one rib of left side 05/08/2024    Closed fracture of shaft of right femur 05/08/2024        Assessment & Plan:   Closed fracture of right acetabulum  Closed fracture of posterior 7th  rib of left side  Closed fracture of pubic ramus   Closed fracture of right femur    Closed fracture of right patella   Closed fracture of right tibial plateau   Open right ankle fracture  Closed fracture of " right foot  Distal radius fracture, right  Fracture of right ulna    S/p ancef   Multimodal pain control.    Orthopedics following  S/p IMR of right femur and fixation of right radius 5/9  Lovenox BID  WB trough elbows BUE   NWB RLE   Elevate extremities  Neurovascular checks   Optimize bowel regimen          Pulmonary contusion  PT and OT once able.    Aggressive incentive spirometry.    On room air.     Anemia  S/p 2u 5/10 w/ appropriate response   Trend CBC     Lactic acidosis- resolved  1 L IV fluid bolus given.      DONAL (acute kidney injury)-resolved  Aggressive IV fluids.   Trend Bun/ CR      Leukocytosis- resolved  Likely reactive to trauma.    continue to monitor with daily labs    CK elevated- improving  CTM, trend CK q12h  Continue IVF     Dispo: Rehab/ PT / OT        Dudley Blackwood MD, MBS  [facility] Resident, [facility]
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
## 2024-05-15 ENCOUNTER — HOSPITAL ENCOUNTER (INPATIENT)
Facility: HOSPITAL | Age: 24
LOS: 8 days | Discharge: HOME HEALTH | DRG: 560 | End: 2024-05-23
Attending: INTERNAL MEDICINE | Admitting: INTERNAL MEDICINE
Payer: COMMERCIAL

## 2024-05-15 VITALS
SYSTOLIC BLOOD PRESSURE: 120 MMHG | DIASTOLIC BLOOD PRESSURE: 74 MMHG | HEART RATE: 102 BPM | WEIGHT: 140 LBS | RESPIRATION RATE: 18 BRPM | BODY MASS INDEX: 24.8 KG/M2 | TEMPERATURE: 98 F | OXYGEN SATURATION: 97 % | HEIGHT: 63 IN

## 2024-05-15 DIAGNOSIS — T07.XXXA CRITICAL POLYTRAUMA: Primary | ICD-10-CM

## 2024-05-15 LAB — CK SERPL-CCNC: 820 U/L (ref 30–200)

## 2024-05-15 PROCEDURE — 97530 THERAPEUTIC ACTIVITIES: CPT | Mod: CQ

## 2024-05-15 PROCEDURE — 99900031 HC PATIENT EDUCATION (STAT)

## 2024-05-15 PROCEDURE — 97530 THERAPEUTIC ACTIVITIES: CPT | Mod: CO

## 2024-05-15 PROCEDURE — 94799 UNLISTED PULMONARY SVC/PX: CPT | Mod: XB

## 2024-05-15 PROCEDURE — 25000003 PHARM REV CODE 250: Performed by: NURSE PRACTITIONER

## 2024-05-15 PROCEDURE — 97116 GAIT TRAINING THERAPY: CPT | Mod: CQ

## 2024-05-15 PROCEDURE — 94761 N-INVAS EAR/PLS OXIMETRY MLT: CPT

## 2024-05-15 PROCEDURE — 99223 1ST HOSP IP/OBS HIGH 75: CPT | Mod: ,,, | Performed by: NURSE PRACTITIONER

## 2024-05-15 PROCEDURE — 63600175 PHARM REV CODE 636 W HCPCS: Performed by: REHABILITATION UNIT

## 2024-05-15 PROCEDURE — 11800000 HC REHAB PRIVATE ROOM

## 2024-05-15 PROCEDURE — 25000003 PHARM REV CODE 250: Performed by: REHABILITATION UNIT

## 2024-05-15 PROCEDURE — 94799 UNLISTED PULMONARY SVC/PX: CPT

## 2024-05-15 PROCEDURE — 99900035 HC TECH TIME PER 15 MIN (STAT)

## 2024-05-15 PROCEDURE — 36415 COLL VENOUS BLD VENIPUNCTURE: CPT | Performed by: NURSE PRACTITIONER

## 2024-05-15 PROCEDURE — 63600175 PHARM REV CODE 636 W HCPCS: Performed by: NURSE PRACTITIONER

## 2024-05-15 PROCEDURE — 82550 ASSAY OF CK (CPK): CPT | Performed by: NURSE PRACTITIONER

## 2024-05-15 RX ORDER — POLYETHYLENE GLYCOL 3350 17 G/17G
17 POWDER, FOR SOLUTION ORAL 2 TIMES DAILY
Status: DISCONTINUED | OUTPATIENT
Start: 2024-05-15 | End: 2024-05-23 | Stop reason: HOSPADM

## 2024-05-15 RX ORDER — ENOXAPARIN SODIUM 100 MG/ML
40 INJECTION SUBCUTANEOUS EVERY 12 HOURS
Status: DISCONTINUED | OUTPATIENT
Start: 2024-05-15 | End: 2024-05-23 | Stop reason: HOSPADM

## 2024-05-15 RX ORDER — ACETAMINOPHEN 325 MG/1
650 TABLET ORAL
Status: DISCONTINUED | OUTPATIENT
Start: 2024-05-15 | End: 2024-05-17

## 2024-05-15 RX ORDER — ACETAMINOPHEN 325 MG/1
650 TABLET ORAL EVERY 4 HOURS
Status: DISCONTINUED | OUTPATIENT
Start: 2024-05-15 | End: 2024-05-15

## 2024-05-15 RX ORDER — HYDROCORTISONE 1 %
CREAM (GRAM) TOPICAL 2 TIMES DAILY
Status: ON HOLD
Start: 2024-05-15 | End: 2024-05-23 | Stop reason: HOSPADM

## 2024-05-15 RX ORDER — BENZONATATE 100 MG/1
100 CAPSULE ORAL 3 TIMES DAILY PRN
Status: DISCONTINUED | OUTPATIENT
Start: 2024-05-15 | End: 2024-05-23 | Stop reason: HOSPADM

## 2024-05-15 RX ORDER — HYDRALAZINE HYDROCHLORIDE 20 MG/ML
10 INJECTION INTRAMUSCULAR; INTRAVENOUS EVERY 4 HOURS PRN
Status: DISCONTINUED | OUTPATIENT
Start: 2024-05-15 | End: 2024-05-23 | Stop reason: HOSPADM

## 2024-05-15 RX ORDER — OXYCODONE HYDROCHLORIDE 10 MG/1
10 TABLET ORAL EVERY 6 HOURS PRN
Status: ON HOLD
Start: 2024-05-15 | End: 2024-05-23 | Stop reason: HOSPADM

## 2024-05-15 RX ORDER — METHOCARBAMOL 500 MG/1
500 TABLET, FILM COATED ORAL EVERY 8 HOURS
Status: ON HOLD
Start: 2024-05-15 | End: 2024-05-23 | Stop reason: HOSPADM

## 2024-05-15 RX ORDER — AMOXICILLIN 250 MG
2 CAPSULE ORAL 2 TIMES DAILY
Status: DISCONTINUED | OUTPATIENT
Start: 2024-05-15 | End: 2024-05-16

## 2024-05-15 RX ORDER — AMOXICILLIN 250 MG
2 CAPSULE ORAL 2 TIMES DAILY
Status: ON HOLD
Start: 2024-05-15 | End: 2024-05-23

## 2024-05-15 RX ORDER — POLYETHYLENE GLYCOL 3350 17 G/17G
17 POWDER, FOR SOLUTION ORAL 2 TIMES DAILY
Status: ON HOLD
Start: 2024-05-15 | End: 2024-05-23 | Stop reason: HOSPADM

## 2024-05-15 RX ORDER — LABETALOL HCL 20 MG/4 ML
10 SYRINGE (ML) INTRAVENOUS EVERY 4 HOURS PRN
Status: DISCONTINUED | OUTPATIENT
Start: 2024-05-15 | End: 2024-05-23 | Stop reason: HOSPADM

## 2024-05-15 RX ORDER — ALPRAZOLAM 0.25 MG/1
0.25 TABLET ORAL 3 TIMES DAILY PRN
Status: DISCONTINUED | OUTPATIENT
Start: 2024-05-15 | End: 2024-05-23 | Stop reason: HOSPADM

## 2024-05-15 RX ORDER — PROMETHAZINE HYDROCHLORIDE 25 MG/1
25 TABLET ORAL EVERY 6 HOURS PRN
Status: DISCONTINUED | OUTPATIENT
Start: 2024-05-15 | End: 2024-05-23 | Stop reason: HOSPADM

## 2024-05-15 RX ORDER — OXYCODONE HYDROCHLORIDE 5 MG/1
5 TABLET ORAL EVERY 6 HOURS PRN
Status: ON HOLD
Start: 2024-05-15 | End: 2024-05-23

## 2024-05-15 RX ORDER — BISACODYL 10 MG/1
10 SUPPOSITORY RECTAL DAILY PRN
Status: DISCONTINUED | OUTPATIENT
Start: 2024-05-15 | End: 2024-05-23 | Stop reason: HOSPADM

## 2024-05-15 RX ORDER — OXYCODONE HYDROCHLORIDE 5 MG/1
10 TABLET ORAL EVERY 6 HOURS PRN
Status: DISCONTINUED | OUTPATIENT
Start: 2024-05-15 | End: 2024-05-17

## 2024-05-15 RX ORDER — ACETAMINOPHEN 325 MG/1
650 TABLET ORAL EVERY 4 HOURS PRN
Status: DISCONTINUED | OUTPATIENT
Start: 2024-05-15 | End: 2024-05-17

## 2024-05-15 RX ORDER — METHOCARBAMOL 500 MG/1
500 TABLET, FILM COATED ORAL 4 TIMES DAILY
Status: DISCONTINUED | OUTPATIENT
Start: 2024-05-15 | End: 2024-05-17

## 2024-05-15 RX ORDER — HYDROXYZINE PAMOATE 50 MG/1
50 CAPSULE ORAL NIGHTLY PRN
Status: DISCONTINUED | OUTPATIENT
Start: 2024-05-15 | End: 2024-05-16

## 2024-05-15 RX ORDER — DOCUSATE SODIUM 100 MG/1
100 CAPSULE, LIQUID FILLED ORAL 2 TIMES DAILY
Status: DISCONTINUED | OUTPATIENT
Start: 2024-05-15 | End: 2024-05-23 | Stop reason: HOSPADM

## 2024-05-15 RX ORDER — ENOXAPARIN SODIUM 100 MG/ML
40 INJECTION SUBCUTANEOUS EVERY 12 HOURS
Status: ON HOLD
Start: 2024-05-15 | End: 2024-05-23

## 2024-05-15 RX ORDER — ONDANSETRON 4 MG/1
4 TABLET, ORALLY DISINTEGRATING ORAL EVERY 6 HOURS PRN
Status: DISCONTINUED | OUTPATIENT
Start: 2024-05-15 | End: 2024-05-23 | Stop reason: HOSPADM

## 2024-05-15 RX ORDER — POLYETHYLENE GLYCOL 3350 17 G/17G
17 POWDER, FOR SOLUTION ORAL 2 TIMES DAILY
Status: DISCONTINUED | OUTPATIENT
Start: 2024-05-15 | End: 2024-05-15

## 2024-05-15 RX ORDER — METOPROLOL TARTRATE 1 MG/ML
10 INJECTION, SOLUTION INTRAVENOUS
Status: DISCONTINUED | OUTPATIENT
Start: 2024-05-15 | End: 2024-05-23 | Stop reason: HOSPADM

## 2024-05-15 RX ORDER — GABAPENTIN 300 MG/1
300 CAPSULE ORAL 3 TIMES DAILY
Status: ON HOLD
Start: 2024-05-15 | End: 2024-05-23 | Stop reason: HOSPADM

## 2024-05-15 RX ORDER — NITROGLYCERIN 0.4 MG/1
0.4 TABLET SUBLINGUAL EVERY 5 MIN PRN
Status: DISCONTINUED | OUTPATIENT
Start: 2024-05-15 | End: 2024-05-23 | Stop reason: HOSPADM

## 2024-05-15 RX ORDER — OXYCODONE HYDROCHLORIDE 5 MG/1
5 TABLET ORAL EVERY 6 HOURS PRN
Status: DISCONTINUED | OUTPATIENT
Start: 2024-05-15 | End: 2024-05-17

## 2024-05-15 RX ORDER — GABAPENTIN 300 MG/1
300 CAPSULE ORAL 3 TIMES DAILY
Status: DISCONTINUED | OUTPATIENT
Start: 2024-05-15 | End: 2024-05-17

## 2024-05-15 RX ADMIN — HYDROCORTISONE: 1 CREAM TOPICAL at 08:05

## 2024-05-15 RX ADMIN — GABAPENTIN 300 MG: 300 CAPSULE ORAL at 08:05

## 2024-05-15 RX ADMIN — OXYCODONE HYDROCHLORIDE 5 MG: 5 TABLET ORAL at 02:05

## 2024-05-15 RX ADMIN — METHOCARBAMOL 500 MG: 500 TABLET ORAL at 06:05

## 2024-05-15 RX ADMIN — SENNOSIDES AND DOCUSATE SODIUM 2 TABLET: 8.6; 5 TABLET ORAL at 08:05

## 2024-05-15 RX ADMIN — OXYCODONE HYDROCHLORIDE 10 MG: 10 TABLET ORAL at 10:05

## 2024-05-15 RX ADMIN — METHOCARBAMOL 500 MG: 500 TABLET ORAL at 11:05

## 2024-05-15 RX ADMIN — METHOCARBAMOL 500 MG: 500 TABLET ORAL at 02:05

## 2024-05-15 RX ADMIN — GABAPENTIN 300 MG: 300 CAPSULE ORAL at 02:05

## 2024-05-15 RX ADMIN — OXYCODONE HYDROCHLORIDE 10 MG: 10 TABLET ORAL at 02:05

## 2024-05-15 RX ADMIN — OXYCODONE 10 MG: 5 TABLET ORAL at 07:05

## 2024-05-15 RX ADMIN — ACETAMINOPHEN 325MG 650 MG: 325 TABLET ORAL at 07:05

## 2024-05-15 RX ADMIN — DOCUSATE SODIUM 100 MG: 100 CAPSULE, LIQUID FILLED ORAL at 07:05

## 2024-05-15 RX ADMIN — SENNOSIDES AND DOCUSATE SODIUM 2 TABLET: 8.6; 5 TABLET ORAL at 07:05

## 2024-05-15 RX ADMIN — ENOXAPARIN SODIUM 40 MG: 40 INJECTION SUBCUTANEOUS at 08:05

## 2024-05-15 RX ADMIN — GABAPENTIN 300 MG: 300 CAPSULE ORAL at 11:05

## 2024-05-15 RX ADMIN — SODIUM CHLORIDE, POTASSIUM CHLORIDE, SODIUM LACTATE AND CALCIUM CHLORIDE: 600; 310; 30; 20 INJECTION, SOLUTION INTRAVENOUS at 02:05

## 2024-05-15 RX ADMIN — ACETAMINOPHEN 325MG 650 MG: 325 TABLET ORAL at 11:05

## 2024-05-15 RX ADMIN — OXYCODONE HYDROCHLORIDE 5 MG: 5 TABLET ORAL at 06:05

## 2024-05-15 RX ADMIN — ENOXAPARIN SODIUM 40 MG: 40 INJECTION SUBCUTANEOUS at 07:05

## 2024-05-15 RX ADMIN — POLYETHYLENE GLYCOL 3350 17 G: 17 POWDER, FOR SOLUTION ORAL at 08:05
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
## 2024-05-15 NOTE — PLAN OF CARE
F/u with Sveta at  Ortho requesting update on auth status. Stated pt is still pending auth at this time.     Neva Kramer LCSW

## 2024-05-15 NOTE — PROGRESS NOTES
"   Trauma Surgery   Progress Note  Admit Date: 5/8/2024  HD#7  POD#6 Days Post-Op    Subjective:   Interval history:  NAEON. AFVSS. Having BM. Pain controlled and improving.  Tolerating regular feeds without nausea/vomiting. Currently unable to feed himself  CK remains elevated, but down trending.   Denies fever, chills, chest pain or shortness of breath.   Tolerating PT. Reports stable mood. Denies SI/HI.    Home Meds: No current outpatient medications   Scheduled Meds:   enoxparin  40 mg Subcutaneous Q12H    gabapentin  300 mg Oral TID    hydrocortisone   Topical (Top) BID    methocarbamoL  500 mg Oral Q8H    polyethylene glycol  17 g Oral BID    senna-docusate 8.6-50 mg  2 tablet Oral BID     Continuous Infusions:   lactated ringers   Intravenous Continuous 150 mL/hr at 05/15/24 0219 New Bag at 05/15/24 0219     PRN Meds:  Current Facility-Administered Medications:     0.9%  NaCl infusion (for blood administration), , Intravenous, Q24H PRN    HYDROmorphone, 0.2 mg, Intravenous, Q6H PRN    magnesium hydroxide 400 mg/5 ml, 30 mL, Oral, Daily PRN    melatonin, 6 mg, Oral, Nightly PRN    ondansetron, 4 mg, Oral, Q6H PRN    oxyCODONE, 5 mg, Oral, Q4H PRN    oxyCODONE, 10 mg, Oral, Q4H PRN     Objective:     VITAL SIGNS: 24 HR MIN & MAX LAST   Temp  Min: 98 °F (36.7 °C)  Max: 100.3 °F (37.9 °C)  99.4 °F (37.4 °C)   BP  Min: 117/68  Max: 132/78  132/78    Pulse  Min: 88  Max: 123  105    Resp  Min: 16  Max: 18  16    SpO2  Min: 96 %  Max: 100 %  96 %      HT: 5' 3" (160 cm)  WT: 63.5 kg (140 lb)  BMI: 24.8     Intake/output:  Intake/Output - Last 3 Shifts         05/13 0700  05/14 0659 05/14 0700  05/15 0659    P.O. 240     I.V. (mL/kg) 7000 (110.2)     Total Intake(mL/kg) 7240 (114)     Urine (mL/kg/hr) 2450 (1.6) 3450 (2.3)    Total Output 2450 3450    Net +0440 -3450          Stool Occurrence 1 x             Intake/Output Summary (Last 24 hours) at 5/15/2024 0521  Last data filed at 5/15/2024 0447  Gross per 24 hour " "  Intake 7000 ml   Output 3450 ml   Net 3550 ml         Lines/drains/airway:       Peripheral IV - Single Lumen 05/08/24 1207 20 G Anterior;Distal;Left Upper Arm (Active)   Site Assessment Clean;Dry;Intact 05/10/24 0424   Extremity Assessment Distal to IV No abnormal discoloration 05/09/24 1845   Line Status Infusing 05/10/24 0424   Dressing Status Clean;Dry;Intact 05/10/24 0424   Dressing Intervention Integrity maintained 05/10/24 0424   Number of days: 1            Urethral Catheter 05/08/24 2123 Double-lumen;Non-latex;Silicone 16 Fr. (Active)   Site Assessment Clean;Intact 05/09/24 0800   Collection Container Urimeter 05/09/24 1910   Securement Method secured to top of thigh w/ adhesive device 05/09/24 1910   Catheter Care Performed yes 05/09/24 0800   Reason for Continuing Urinary Catheterization Post operative 05/09/24 1910   CAUTI Prevention Bundle Securement Device in place with 1" slack;Intact seal between catheter & drainage tubing;Drainage bag/urimeter off the floor;Sheeting clip in use;No dependent loops or kinks;Drainage bag/urimeter not overfilled (<2/3 full);Drainage bag/urimeter below bladder 05/09/24 0800   Output (mL) 400 mL 05/09/24 0011   Number of days: 1       Physical examination:  Gen: NAD, AAOx3, answering questions appropriately  HEENT: bruising to right superior eyelid, scattered abrasions to right temple,   CV: RR, right peripheral pulse detectable with doppler.  Resp: NWOB  Abd: S/NT/ND  Msk: moving all extremities spontaneously and purposefully splints to right and left forearm and right lower leg clean dry and intact. Significant swelling to RLE, and bilateral UE. Compartments soft and compressible  Neuro: CN II-XII grossly intact  Skin/wounds: scattered abrasions to limbs    Labs:  Renal:  Recent Labs     05/13/24 0437   BUN 8.0*   CREATININE 0.65*     No results for input(s): "LACTIC" in the last 72 hours.    FEN/GI:  Recent Labs     05/13/24 0437      K 4.1   CO2 26   CALCIUM " "8.3*   ALBUMIN 3.0*   BILITOT 1.2   AST 77*   ALKPHOS 87   *     Heme:  Recent Labs     05/13/24 0437   HGB 8.4*   HCT 26.0*        ID:  Recent Labs     05/13/24 0437   WBC 8.74     CBG:  Recent Labs     05/13/24 0437   GLUCOSE 104*      No results for input(s): "POCTGLUCOSE" in the last 72 hours.   Cardiovascular:  No results for input(s): "TROPONINI", "CKTOTAL", "CKMB", "BNP" in the last 168 hours.  I have reviewed all pertinent lab results within the past 24 hours.    Imaging:     I have reviewed all pertinent imaging results/findings within the past 24 hours.    Micro/Path/Other:  Microbiology Results (last 7 days)       ** No results found for the last 168 hours. **           Pathology Results  (Last 7 days)      None             Problems list:  Active Problem List with Overview Notes    Diagnosis Date Noted    Leukocytosis 05/08/2024    Fracture of right ulna 05/08/2024    Distal radius fracture, right 05/08/2024    Closed fracture of right foot 05/08/2024    Open right ankle fracture 05/08/2024    Closed fracture of right tibial plateau 05/08/2024    Closed fracture of right patella 05/08/2024    Other fracture of right femur, initial encounter for closed fracture 05/08/2024    Unspecified fracture of right acetabulum, initial encounter for closed fracture 05/08/2024    Closed fracture of pubic ramus 05/08/2024    DONAL (acute kidney injury) 05/08/2024    Lactic acidosis 05/08/2024    Pulmonary contusion 05/08/2024    Closed fracture of one rib of left side 05/08/2024    Closed fracture of shaft of right femur 05/08/2024        Assessment & Plan:   Closed fracture of right acetabulum  Closed fracture of posterior 7th  rib of left side  Closed fracture of pubic ramus   Closed fracture of right femur    Closed fracture of right patella   Closed fracture of right tibial plateau   Open right ankle fracture  Closed fracture of right foot  Distal radius fracture, right  Fracture of right ulna    S/p " ancef   Multimodal pain control.    Orthopedics following  S/p IMR of right femur and fixation of right radius 5/9  Lovenox BID  WB trough elbows BUE   NWB RLE   Elevate extremities  Neurovascular checks   Optimize bowel regimen          Pulmonary contusion  PT and OT as tolerated.    Aggressive incentive spirometry.    On room air.     Anemia, stable  S/p 2u 5/10 w/ appropriate response  H/H 8.4/26.0, asymptomatic, stable   Trend CBC     Lactic acidosis- resolved  1 L IV fluid bolus given.      DONAL (acute kidney injury)-resolved  Aggressive IV fluids.   Trend Bun/ CR      Leukocytosis- resolved  Likely reactive to trauma.    continue to monitor with daily labs    CK elevated- improving (more than halved over the past 24 hours - @ 820)  CTM, trend CK q12h  Continue IVF     Dispo: Rehab/ PT / OT        Dudley Blackwood MD, MBS  [facility] Resident, -I
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
## 2024-05-15 NOTE — NURSING
Report given to nurse at [facility].All questions answered. Discharge Instructions reviewed. Patient left with peripheral IV and Midline. Discharged via [facility].

## 2024-05-15 NOTE — PLAN OF CARE
05/15/24 1457   Final Note   Assessment Type Final Discharge Note   Anticipated Discharge Disposition Rehab   Hospital Resources/Appts/Education Provided Post-Acute resouces added to AVS   Post-Acute Status   Post-Acute Authorization Placement   Post-Acute Placement Status Set-up Complete/Auth obtained   Discharge Delays None known at this time     Pt discharging to  [facility]. Transport with [facility] at 4pm. Pt and mother updated on POC.     Neva Kramer LCSW

## 2024-05-15 NOTE — PLAN OF CARE
Problem: Rehabilitation (IRF) Plan of Care  Goal: Plan of Care Review  Outcome: Progressing  Goal: Patient-Specific Goal (Individualized)  Outcome: Progressing  Goal: Absence of New-Onset Illness or Injury  Outcome: Progressing  Goal: Optimal Comfort and Wellbeing  Outcome: Progressing  Goal: Home and Community Transition Plan Established  Outcome: Progressing
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
## 2024-05-16 LAB
ABS NEUT CALC (OHS): 10.56 X10(3)/MCL (ref 2.1–9.2)
ALBUMIN SERPL-MCNC: 3.2 G/DL (ref 3.5–5)
ALBUMIN/GLOB SERPL: 0.8 RATIO (ref 1.1–2)
ALP SERPL-CCNC: 145 UNIT/L (ref 40–150)
ALT SERPL-CCNC: 131 UNIT/L (ref 0–55)
ANISOCYTOSIS BLD QL SMEAR: SLIGHT
AST SERPL-CCNC: 42 UNIT/L (ref 5–34)
BILIRUB SERPL-MCNC: 1.6 MG/DL
BILIRUB UR QL STRIP.AUTO: NEGATIVE
BUN SERPL-MCNC: 12.3 MG/DL (ref 8.9–20.6)
CALCIUM SERPL-MCNC: 9.2 MG/DL (ref 8.4–10.2)
CHLORIDE SERPL-SCNC: 100 MMOL/L (ref 98–107)
CLARITY UR: CLEAR
CO2 SERPL-SCNC: 30 MMOL/L (ref 22–29)
COLOR UR AUTO: YELLOW
CREAT SERPL-MCNC: 0.73 MG/DL (ref 0.73–1.18)
EOSINOPHIL NFR BLD MANUAL: 0.41 X10(3)/MCL (ref 0–0.9)
EOSINOPHIL NFR BLD MANUAL: 3 % (ref 0–8)
ERYTHROCYTE [DISTWIDTH] IN BLOOD BY AUTOMATED COUNT: 13.6 % (ref 11.5–17)
FERRITIN SERPL-MCNC: 380.3 NG/ML (ref 21.81–274.66)
GFR SERPLBLD CREATININE-BSD FMLA CKD-EPI: >60 ML/MIN/1.73/M2
GLOBULIN SER-MCNC: 4 GM/DL (ref 2.4–3.5)
GLUCOSE SERPL-MCNC: 108 MG/DL (ref 74–100)
GLUCOSE UR QL STRIP: NEGATIVE
HCT VFR BLD AUTO: 30.1 % (ref 42–52)
HGB BLD-MCNC: 9.6 G/DL (ref 14–18)
HGB UR QL STRIP: NEGATIVE
HYPOCHROMIA BLD QL SMEAR: ABNORMAL
IRON SATN MFR SERPL: 20 % (ref 20–50)
IRON SERPL-MCNC: 67 UG/DL (ref 65–175)
KETONES UR QL STRIP: NEGATIVE
LEUKOCYTE ESTERASE UR QL STRIP: NEGATIVE
LYMPHOCYTES NFR BLD MANUAL: 1.1 X10(3)/MCL
LYMPHOCYTES NFR BLD MANUAL: 8 % (ref 13–40)
MACROCYTES BLD QL SMEAR: SLIGHT
MAGNESIUM SERPL-MCNC: 2.3 MG/DL (ref 1.6–2.6)
MCH RBC QN AUTO: 30.2 PG (ref 27–31)
MCHC RBC AUTO-ENTMCNC: 31.9 G/DL (ref 33–36)
MCV RBC AUTO: 94.7 FL (ref 80–94)
METAMYELOCYTES NFR BLD MANUAL: 1 %
MICROCYTES BLD QL SMEAR: ABNORMAL
MONOCYTES NFR BLD MANUAL: 0.96 X10(3)/MCL (ref 0.1–1.3)
MONOCYTES NFR BLD MANUAL: 7 % (ref 2–11)
NEUTROPHILS NFR BLD MANUAL: 72 % (ref 47–80)
NEUTS BAND NFR BLD MANUAL: 5 % (ref 0–11)
NITRITE UR QL STRIP: NEGATIVE
NRBC BLD AUTO-RTO: 0.6 %
PH UR STRIP: 7 [PH]
PHOSPHATE SERPL-MCNC: 4.2 MG/DL (ref 2.3–4.7)
PLATELET # BLD AUTO: 361 X10(3)/MCL (ref 130–400)
PLATELET # BLD EST: NORMAL 10*3/UL
PMV BLD AUTO: 8.7 FL (ref 7.4–10.4)
POLYCHROMASIA BLD QL SMEAR: ABNORMAL
POTASSIUM SERPL-SCNC: 4.2 MMOL/L (ref 3.5–5.1)
PREALB SERPL-MCNC: 21.5 MG/DL (ref 18–45)
PROMYELOCYTES # BLD MANUAL: 4 %
PROT SERPL-MCNC: 7.2 GM/DL (ref 6.4–8.3)
PROT UR QL STRIP: NEGATIVE
RBC # BLD AUTO: 3.18 X10(6)/MCL (ref 4.7–6.1)
RBC MORPH BLD: ABNORMAL
SODIUM SERPL-SCNC: 137 MMOL/L (ref 136–145)
SP GR UR STRIP.AUTO: 1.01 (ref 1–1.03)
TARGETS BLD QL SMEAR: SLIGHT
TIBC SERPL-MCNC: 273 UG/DL (ref 69–240)
TIBC SERPL-MCNC: 340 UG/DL (ref 250–450)
TRANSFERRIN SERPL-MCNC: 309 MG/DL (ref 174–364)
UROBILINOGEN UR STRIP-ACNC: 1
WBC # SPEC AUTO: 13.72 X10(3)/MCL (ref 4.5–11.5)

## 2024-05-16 PROCEDURE — 11800000 HC REHAB PRIVATE ROOM

## 2024-05-16 PROCEDURE — 84100 ASSAY OF PHOSPHORUS: CPT | Performed by: NURSE PRACTITIONER

## 2024-05-16 PROCEDURE — 36415 COLL VENOUS BLD VENIPUNCTURE: CPT | Performed by: NURSE PRACTITIONER

## 2024-05-16 PROCEDURE — 82728 ASSAY OF FERRITIN: CPT | Performed by: NURSE PRACTITIONER

## 2024-05-16 PROCEDURE — 97530 THERAPEUTIC ACTIVITIES: CPT

## 2024-05-16 PROCEDURE — 97535 SELF CARE MNGMENT TRAINING: CPT

## 2024-05-16 PROCEDURE — 25000003 PHARM REV CODE 250: Performed by: NURSE PRACTITIONER

## 2024-05-16 PROCEDURE — 92523 SPEECH SOUND LANG COMPREHEN: CPT

## 2024-05-16 PROCEDURE — 94799 UNLISTED PULMONARY SVC/PX: CPT

## 2024-05-16 PROCEDURE — 80053 COMPREHEN METABOLIC PANEL: CPT | Performed by: NURSE PRACTITIONER

## 2024-05-16 PROCEDURE — 85027 COMPLETE CBC AUTOMATED: CPT | Performed by: NURSE PRACTITIONER

## 2024-05-16 PROCEDURE — 83735 ASSAY OF MAGNESIUM: CPT | Performed by: NURSE PRACTITIONER

## 2024-05-16 PROCEDURE — 97110 THERAPEUTIC EXERCISES: CPT

## 2024-05-16 PROCEDURE — 83540 ASSAY OF IRON: CPT | Performed by: NURSE PRACTITIONER

## 2024-05-16 PROCEDURE — 99900031 HC PATIENT EDUCATION (STAT)

## 2024-05-16 PROCEDURE — 97162 PT EVAL MOD COMPLEX 30 MIN: CPT

## 2024-05-16 PROCEDURE — 63600175 PHARM REV CODE 636 W HCPCS: Performed by: NURSE PRACTITIONER

## 2024-05-16 PROCEDURE — 99900035 HC TECH TIME PER 15 MIN (STAT)

## 2024-05-16 PROCEDURE — 94761 N-INVAS EAR/PLS OXIMETRY MLT: CPT

## 2024-05-16 PROCEDURE — 97166 OT EVAL MOD COMPLEX 45 MIN: CPT

## 2024-05-16 PROCEDURE — 84134 ASSAY OF PREALBUMIN: CPT | Performed by: NURSE PRACTITIONER

## 2024-05-16 PROCEDURE — 81003 URINALYSIS AUTO W/O SCOPE: CPT | Performed by: NURSE PRACTITIONER

## 2024-05-16 RX ORDER — HYDROXYZINE PAMOATE 50 MG/1
50 CAPSULE ORAL NIGHTLY
Status: DISCONTINUED | OUTPATIENT
Start: 2024-05-16 | End: 2024-05-23 | Stop reason: HOSPADM

## 2024-05-16 RX ADMIN — GABAPENTIN 300 MG: 300 CAPSULE ORAL at 05:05

## 2024-05-16 RX ADMIN — GABAPENTIN 300 MG: 300 CAPSULE ORAL at 01:05

## 2024-05-16 RX ADMIN — METHOCARBAMOL 500 MG: 500 TABLET ORAL at 11:05

## 2024-05-16 RX ADMIN — OXYCODONE 10 MG: 5 TABLET ORAL at 09:05

## 2024-05-16 RX ADMIN — POLYETHYLENE GLYCOL 3350 17 G: 17 POWDER, FOR SOLUTION ORAL at 08:05

## 2024-05-16 RX ADMIN — ACETAMINOPHEN 325MG 650 MG: 325 TABLET ORAL at 05:05

## 2024-05-16 RX ADMIN — OXYCODONE 10 MG: 5 TABLET ORAL at 01:05

## 2024-05-16 RX ADMIN — ENOXAPARIN SODIUM 40 MG: 40 INJECTION SUBCUTANEOUS at 09:05

## 2024-05-16 RX ADMIN — ACETAMINOPHEN 325MG 650 MG: 325 TABLET ORAL at 09:05

## 2024-05-16 RX ADMIN — NALOXEGOL OXALATE 25 MG: 25 TABLET, FILM COATED ORAL at 08:05

## 2024-05-16 RX ADMIN — HYDROXYZINE PAMOATE 50 MG: 50 CAPSULE ORAL at 09:05

## 2024-05-16 RX ADMIN — ACETAMINOPHEN 325MG 650 MG: 325 TABLET ORAL at 11:05

## 2024-05-16 RX ADMIN — OXYCODONE 10 MG: 5 TABLET ORAL at 03:05

## 2024-05-16 RX ADMIN — OXYCODONE 10 MG: 5 TABLET ORAL at 08:05

## 2024-05-16 RX ADMIN — GABAPENTIN 300 MG: 300 CAPSULE ORAL at 09:05

## 2024-05-16 RX ADMIN — ENOXAPARIN SODIUM 40 MG: 40 INJECTION SUBCUTANEOUS at 08:05

## 2024-05-16 RX ADMIN — SENNOSIDES AND DOCUSATE SODIUM 2 TABLET: 8.6; 5 TABLET ORAL at 08:05

## 2024-05-16 RX ADMIN — DOCUSATE SODIUM 100 MG: 100 CAPSULE, LIQUID FILLED ORAL at 08:05

## 2024-05-16 RX ADMIN — METHOCARBAMOL 500 MG: 500 TABLET ORAL at 05:05

## 2024-05-16 RX ADMIN — DOCUSATE SODIUM 100 MG: 100 CAPSULE, LIQUID FILLED ORAL at 09:05

## 2024-05-16 RX ADMIN — ACETAMINOPHEN 325MG 650 MG: 325 TABLET ORAL at 08:05
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
## 2024-05-16 NOTE — PLAN OF CARE
Problem: Infection  Goal: Absence of Infection Signs and Symptoms  Outcome: Progressing     Problem: Wound  Goal: Optimal Coping  Outcome: Progressing     Problem: Skin Injury Risk Increased  Goal: Skin Health and Integrity  Outcome: Progressing     Problem: Pain Acute  Goal: Optimal Pain Control and Function  Outcome: Progressing
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
## 2024-05-16 NOTE — PLAN OF CARE
Problem: Acute Kidney Injury/Impairment  Goal: Fluid and Electrolyte Balance  Outcome: Progressing  Goal: Improved Oral Intake  Outcome: Progressing  Goal: Effective Renal Function  Outcome: Progressing

## 2024-05-16 NOTE — PLAN OF CARE
Problem: Physical Therapy  Goal: Physical Therapy Goal  Description: Bed Mobility:  Roll left and right with setup/clean-up assist.   Sit to supine transfer with setup/clean-up assist.   Supine to sit transfer with supervision/touching assist.     Transfers:  Sit to stand transfer with supervision/touching assist using RW.   Bed to chair transfer with supervision/touching assist Stand Pivot  using [place]ell Platform RW.   Car transfer with partial/moderate assist using [place]ell Platform RW.    an object from the ground in standing position with partial/moderate assist using Martell Platform RW and reacher.     Mobility:  Ambulate 100 feet with partial/moderate assist using Martell Platform RW.  Ambulate 10 feet on uneven surfaces/ramps with partial/moderate assist using Martell Platform RW.   Pt ascended/descended a 4 inch curb with partial/moderate assist using Martell Platform RW, ascending backward, descending forward.   Manual wheelchair 50 feet with supervision/touching assist using Left lower extremity.     Outcome: Initial
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
## 2024-05-16 NOTE — PLAN OF CARE
Problem: Occupational Therapy  Goal: Occupational Therapy Goal  Description:     ADLs:  Pt to perform grooming and oral hygiene tasks with independence at wheelchair level  Pt to perform feeding tasks with independence  Pt to perform UB dressing with setup assistance sitting unsupported   Pt to perform LB dressing with max A using AE as needed   Pt to perform putting on/off footwear task with mod A using AE as needed  Pt to perform toileting with max A  Pt to perform bathing with min A using AE as needed    Functional Transfers:  Pt to perform toilet transfers with min A and BSC over toilet   Pt to perform a tub transfer with mod A with TTB and GBs   Pt to perform a walk-in shower transfer with least restrictive DME (SC vs TTB) and min A    IADLs:  Pt to perform simple meal prep and light housekeeping with independence at wheelchair level   Outcome: Progressing
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
## 2024-05-16 NOTE — ACP (ADVANCE CARE PLANNING)
Advance Directives:   Living Will: No        Oral Declaration: Yes  LaPOST: No    Do Not Resuscitate Status: No    Medical Power of : No        Oral Declaration: Yes    Decision Making:  Patient answered questions  Goals of Care: The patient endorses that what is most important right now is to focus on extending life as long as possible, even it it means sacrificing quality    Accordingly, we have decided that the best plan to meet the patient's goals includes continuing with treatment    Witnesses present:  Patient and provider  ACP 2/2 new patient   ACP discussed voluntarily    Total time spent: 16 minutes
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
## 2024-05-16 NOTE — PLAN OF CARE
"Admission PHQ completed (score=0 negative).    Pt's only stated concern is knowing if/when he will ever be able to exercise/"work out" again.  Dr. Hastings will visit with him to discuss this concern.      Relayed my role in CM and discharge planning.  "
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
## 2024-05-16 NOTE — PLAN OF CARE
Problem: Acute Kidney Injury/Impairment  Goal: Fluid and Electrolyte Balance  5/16/2024 0418 by Tomasa Aguilera LPN  Outcome: Progressing  5/16/2024 0116 by Tomasa Aguilera LPN  Outcome: Progressing  Goal: Improved Oral Intake  5/16/2024 0418 by Tomasa Aguilera LPN  Outcome: Progressing  5/16/2024 0116 by Tomasa Aguilera LPN  Outcome: Progressing  Goal: Effective Renal Function  5/16/2024 0418 by Tomasa Aguilera LPN  Outcome: Progressing  5/16/2024 0116 by Tomasa Aguilera LPN  Outcome: Progressing

## 2024-05-17 PROCEDURE — 97110 THERAPEUTIC EXERCISES: CPT

## 2024-05-17 PROCEDURE — 25000003 PHARM REV CODE 250: Performed by: NURSE PRACTITIONER

## 2024-05-17 PROCEDURE — 63600175 PHARM REV CODE 636 W HCPCS: Performed by: NURSE PRACTITIONER

## 2024-05-17 PROCEDURE — 94799 UNLISTED PULMONARY SVC/PX: CPT

## 2024-05-17 PROCEDURE — 94761 N-INVAS EAR/PLS OXIMETRY MLT: CPT

## 2024-05-17 PROCEDURE — 94799 UNLISTED PULMONARY SVC/PX: CPT | Mod: XB

## 2024-05-17 PROCEDURE — 97110 THERAPEUTIC EXERCISES: CPT | Mod: CQ

## 2024-05-17 PROCEDURE — 97535 SELF CARE MNGMENT TRAINING: CPT

## 2024-05-17 PROCEDURE — 96125 COGNITIVE TEST BY HC PRO: CPT

## 2024-05-17 PROCEDURE — 99233 SBSQ HOSP IP/OBS HIGH 50: CPT | Mod: ,,, | Performed by: NURSE PRACTITIONER

## 2024-05-17 PROCEDURE — 97530 THERAPEUTIC ACTIVITIES: CPT

## 2024-05-17 PROCEDURE — 11800000 HC REHAB PRIVATE ROOM

## 2024-05-17 PROCEDURE — 99900031 HC PATIENT EDUCATION (STAT)

## 2024-05-17 PROCEDURE — 97530 THERAPEUTIC ACTIVITIES: CPT | Mod: CQ

## 2024-05-17 RX ORDER — OXYCODONE HYDROCHLORIDE 5 MG/1
10 TABLET ORAL EVERY 4 HOURS PRN
Status: DISCONTINUED | OUTPATIENT
Start: 2024-05-17 | End: 2024-05-23 | Stop reason: HOSPADM

## 2024-05-17 RX ORDER — DULOXETIN HYDROCHLORIDE 30 MG/1
30 CAPSULE, DELAYED RELEASE ORAL DAILY
Status: DISCONTINUED | OUTPATIENT
Start: 2024-05-18 | End: 2024-05-23 | Stop reason: HOSPADM

## 2024-05-17 RX ORDER — OXYCODONE HYDROCHLORIDE 5 MG/1
5 TABLET ORAL EVERY 4 HOURS PRN
Status: DISCONTINUED | OUTPATIENT
Start: 2024-05-17 | End: 2024-05-23 | Stop reason: HOSPADM

## 2024-05-17 RX ORDER — TRAMADOL HYDROCHLORIDE 50 MG/1
50 TABLET ORAL EVERY 4 HOURS PRN
Status: DISCONTINUED | OUTPATIENT
Start: 2024-05-17 | End: 2024-05-23 | Stop reason: HOSPADM

## 2024-05-17 RX ORDER — GABAPENTIN 300 MG/1
300 CAPSULE ORAL
Status: DISCONTINUED | OUTPATIENT
Start: 2024-05-17 | End: 2024-05-18

## 2024-05-17 RX ORDER — ACETAMINOPHEN 325 MG/1
650 TABLET ORAL
Status: DISCONTINUED | OUTPATIENT
Start: 2024-05-17 | End: 2024-05-18

## 2024-05-17 RX ORDER — GABAPENTIN 300 MG/1
300 CAPSULE ORAL NIGHTLY
Status: DISCONTINUED | OUTPATIENT
Start: 2024-05-17 | End: 2024-05-18

## 2024-05-17 RX ORDER — METHOCARBAMOL 750 MG/1
750 TABLET, FILM COATED ORAL NIGHTLY
Status: DISCONTINUED | OUTPATIENT
Start: 2024-05-17 | End: 2024-05-18

## 2024-05-17 RX ORDER — ACETAMINOPHEN 325 MG/1
650 TABLET ORAL NIGHTLY
Status: DISCONTINUED | OUTPATIENT
Start: 2024-05-17 | End: 2024-05-18

## 2024-05-17 RX ORDER — METHOCARBAMOL 500 MG/1
500 TABLET, FILM COATED ORAL 3 TIMES DAILY PRN
Status: DISCONTINUED | OUTPATIENT
Start: 2024-05-17 | End: 2024-05-23 | Stop reason: HOSPADM

## 2024-05-17 RX ORDER — METHOCARBAMOL 750 MG/1
750 TABLET, FILM COATED ORAL
Status: DISCONTINUED | OUTPATIENT
Start: 2024-05-17 | End: 2024-05-18

## 2024-05-17 RX ADMIN — METHOCARBAMOL 500 MG: 500 TABLET ORAL at 12:05

## 2024-05-17 RX ADMIN — ACETAMINOPHEN 650 MG: 325 TABLET ORAL at 04:05

## 2024-05-17 RX ADMIN — OXYCODONE 5 MG: 5 TABLET ORAL at 08:05

## 2024-05-17 RX ADMIN — ACETAMINOPHEN 325MG 650 MG: 325 TABLET ORAL at 12:05

## 2024-05-17 RX ADMIN — METHOCARBAMOL TABLETS 750 MG: 750 TABLET, COATED ORAL at 04:05

## 2024-05-17 RX ADMIN — DOCUSATE SODIUM 100 MG: 100 CAPSULE, LIQUID FILLED ORAL at 08:05

## 2024-05-17 RX ADMIN — METHOCARBAMOL 500 MG: 500 TABLET ORAL at 05:05

## 2024-05-17 RX ADMIN — OXYCODONE 10 MG: 5 TABLET ORAL at 05:05

## 2024-05-17 RX ADMIN — ACETAMINOPHEN 325MG 650 MG: 325 TABLET ORAL at 08:05

## 2024-05-17 RX ADMIN — ENOXAPARIN SODIUM 40 MG: 40 INJECTION SUBCUTANEOUS at 09:05

## 2024-05-17 RX ADMIN — GABAPENTIN 300 MG: 300 CAPSULE ORAL at 05:05

## 2024-05-17 RX ADMIN — HYDROXYZINE PAMOATE 50 MG: 50 CAPSULE ORAL at 08:05

## 2024-05-17 RX ADMIN — GABAPENTIN 300 MG: 300 CAPSULE ORAL at 08:05

## 2024-05-17 RX ADMIN — METHOCARBAMOL TABLETS 750 MG: 750 TABLET, COATED ORAL at 08:05

## 2024-05-17 RX ADMIN — NALOXEGOL OXALATE 25 MG: 25 TABLET, FILM COATED ORAL at 08:05

## 2024-05-17 RX ADMIN — ENOXAPARIN SODIUM 40 MG: 40 INJECTION SUBCUTANEOUS at 08:05

## 2024-05-17 RX ADMIN — POLYETHYLENE GLYCOL 3350 17 G: 17 POWDER, FOR SOLUTION ORAL at 08:05

## 2024-05-17 RX ADMIN — OXYCODONE 5 MG: 5 TABLET ORAL at 02:05
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
## 2024-05-17 NOTE — PROGRESS NOTES
05/17/24 1400   Rec Therapy Time Calculation   Date of Treatment 05/17/24   Rec Start Time 1400   Rec Stop Time 1430   Rec Total Time (min) 30 min   Amount of Missed Time 30 Minutes   Missed Treatment Reason Patient unwilling to participate
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
## 2024-05-17 NOTE — PT/OT/SLP EVAL
"[facility] - Rehabilitation Unit  Speech Language Pathology Department  Standardized Cognitive Testing    Patient Name:  Venkata Douglas   MRN:  [id number]    Recommendations     General recommendations:  SLP intervention not indicated  Communication strategies:  none     Discharge therapy intensity:  not indicated  Barriers to safe discharge:  none    Subjective     Patient awake and alert.    Patient goals: "I don't want to do this"   Spiritual/Cultural/Jewish Beliefs/Practices that affect care:  no    Pain/Comfort:  0/10  Respiratory Status: room air    Results     Standardized cognitive testing was completed using the Cognitive Linguistic Quick Test (CLQT).  Results are as follows:    Domain Score Severity   Attention 187 WNL   Memory 164 WNL   Executive Functions 28 WNL   Language 31 WNL   Visuospatial Skills 92 WNL   Clock Drawing 12 WNL   Composite Score 4.0 WNL     Assessment     Pt presents with cognitive linguistic skills that are within normal limits. Skilled SLP intervention not warranted at this time. SLP to sign off. Please reconsult as warranted.     Patient Education     Patient provided with verbal education regarding SLP POC.  Understanding was verbalized.    Plan     SLP Follow-Up:   no    Plan of Care reviewed with:   patient     Recommendations     SLP Treatment Date:   05/17/24  Speech Start Time:  1100  Speech Stop Time:  1130     Speech Total Time (min):  30 min    Billable minutes:   Standardized Cognitive Performance Testing, 30 minutes     05/17/2024         "

## 2024-05-17 NOTE — PLAN OF CARE
"Was informed by therapy/NP staff that pt wants to go home today and was reportedly calling his mother to come to pick him up.      I met with pt and asked what has changed since we met yesterday, after he agreed to come to rehab.  He said, "I just hate [facility]."  I asked why he agreed to come to rehab, and he said, "Cause I wanted to heal."  He could not explain what has changed to make him wish to leave, other than the above comment.  Suspect that pt having undergone a cognitive eval may have irritated him.  He did pass the cog eval quickly and well.  He is aware that he will no longer need to be seen by SLP.  Dr. Hastings had already met with pt during that session to explain rationale of the cog eval.      I explained to pt that he is not yet ready for discharge (we have not arranged DME or HH, he has steps at his grandparents' home, etc.).  Also explained that if he chooses to leave AMA, he would leave with no scripts for meds, no HH, no DME, no follow-up appointments, etc.      I asked if he would like more time to consider his options, and he said yes.    Mother then called nurse Nikki at the desk and discussed.  She is NOT in agreement with pt leaving and said that she will no longer answer his calls for the time being.  She indicated that the pt's grandparents are around 80 years old and are taking care of his dogs currently and cannot also take care of him until he is stronger.  She herself has 4 other children to care for and says she cannot take care of him in his current condition.      Again met with patient and asked his decision, he said, "I want to leave but don't have a ride."  I asked if he would agree to stay through next week and continue doing therapy to get stronger before then, and he said, "Yes."  (He soon after refused OT with Mary.)  I also asked pt to f/u with his grandfather about installation/build of the ramp for the 2 steps, and he agreed.    "

## 2024-05-17 NOTE — PT/OT/SLP PROGRESS
"Occupational Therapy      Patient Name:  Venkata Douglas   MRN:  [id number]    Patient not seen today secondary to Patient unwilling to participate. Pt supine in bed upon OT arrival. Pt reported, "I don't want to do therapy." When OT questioned pt's reasoning as to why, he reported, "I just don't feel like it." OT attempted at getting pt to participate in 30 minutes of OT treatment; however, pt refusing to get out of bed at this time. OT notified TATE Patricia immediately following. All needs left within reach.    Amount of therapy minutes missed:  30 Minutes.    5/17/2024  "

## 2024-05-17 NOTE — PT/OT/SLP PROGRESS
"Pt was scheduled from [id number] but refused to participate stating he was "too tired to get out of bed."    Therapy Dog Musa was brought to his room for a Room Visit at 1430, but he refused to sit at edge of bed to pet Musa. He petted Musa while laying in bed.  "

## 2024-05-18 PROCEDURE — 94799 UNLISTED PULMONARY SVC/PX: CPT

## 2024-05-18 PROCEDURE — 97110 THERAPEUTIC EXERCISES: CPT | Mod: CQ

## 2024-05-18 PROCEDURE — S4991 NICOTINE PATCH NONLEGEND: HCPCS | Performed by: NURSE PRACTITIONER

## 2024-05-18 PROCEDURE — 97530 THERAPEUTIC ACTIVITIES: CPT

## 2024-05-18 PROCEDURE — 25000003 PHARM REV CODE 250: Performed by: NURSE PRACTITIONER

## 2024-05-18 PROCEDURE — 11800000 HC REHAB PRIVATE ROOM

## 2024-05-18 PROCEDURE — 63600175 PHARM REV CODE 636 W HCPCS: Performed by: NURSE PRACTITIONER

## 2024-05-18 PROCEDURE — 99233 SBSQ HOSP IP/OBS HIGH 50: CPT | Mod: ,,, | Performed by: NURSE PRACTITIONER

## 2024-05-18 PROCEDURE — 97116 GAIT TRAINING THERAPY: CPT

## 2024-05-18 PROCEDURE — 99900031 HC PATIENT EDUCATION (STAT)

## 2024-05-18 PROCEDURE — 94761 N-INVAS EAR/PLS OXIMETRY MLT: CPT

## 2024-05-18 RX ORDER — ACETAMINOPHEN 325 MG/1
650 TABLET ORAL EVERY 6 HOURS
Status: DISCONTINUED | OUTPATIENT
Start: 2024-05-18 | End: 2024-05-23 | Stop reason: HOSPADM

## 2024-05-18 RX ORDER — GABAPENTIN 400 MG/1
400 CAPSULE ORAL
Status: DISCONTINUED | OUTPATIENT
Start: 2024-05-18 | End: 2024-05-23 | Stop reason: HOSPADM

## 2024-05-18 RX ORDER — METHOCARBAMOL 500 MG/1
1000 TABLET, FILM COATED ORAL EVERY 6 HOURS
Status: DISCONTINUED | OUTPATIENT
Start: 2024-05-18 | End: 2024-05-23 | Stop reason: HOSPADM

## 2024-05-18 RX ORDER — IBUPROFEN 200 MG
1 TABLET ORAL DAILY
Status: DISCONTINUED | OUTPATIENT
Start: 2024-05-18 | End: 2024-05-23 | Stop reason: HOSPADM

## 2024-05-18 RX ADMIN — NALOXEGOL OXALATE 25 MG: 25 TABLET, FILM COATED ORAL at 08:05

## 2024-05-18 RX ADMIN — OXYCODONE 10 MG: 5 TABLET ORAL at 05:05

## 2024-05-18 RX ADMIN — ACETAMINOPHEN 325MG 650 MG: 325 TABLET ORAL at 12:05

## 2024-05-18 RX ADMIN — POLYETHYLENE GLYCOL 3350 17 G: 17 POWDER, FOR SOLUTION ORAL at 08:05

## 2024-05-18 RX ADMIN — DOCUSATE SODIUM 100 MG: 100 CAPSULE, LIQUID FILLED ORAL at 09:05

## 2024-05-18 RX ADMIN — HYDROXYZINE PAMOATE 50 MG: 50 CAPSULE ORAL at 09:05

## 2024-05-18 RX ADMIN — METHOCARBAMOL 500 MG: 500 TABLET ORAL at 09:05

## 2024-05-18 RX ADMIN — OXYCODONE 10 MG: 5 TABLET ORAL at 09:05

## 2024-05-18 RX ADMIN — OXYCODONE 10 MG: 5 TABLET ORAL at 04:05

## 2024-05-18 RX ADMIN — DOCUSATE SODIUM 100 MG: 100 CAPSULE, LIQUID FILLED ORAL at 08:05

## 2024-05-18 RX ADMIN — OXYCODONE 5 MG: 5 TABLET ORAL at 08:05

## 2024-05-18 RX ADMIN — METHOCARBAMOL 1000 MG: 500 TABLET ORAL at 04:05

## 2024-05-18 RX ADMIN — GABAPENTIN 400 MG: 400 CAPSULE ORAL at 04:05

## 2024-05-18 RX ADMIN — DULOXETINE HYDROCHLORIDE 30 MG: 30 CAPSULE, DELAYED RELEASE ORAL at 08:05

## 2024-05-18 RX ADMIN — GABAPENTIN 300 MG: 300 CAPSULE ORAL at 06:05

## 2024-05-18 RX ADMIN — ENOXAPARIN SODIUM 40 MG: 40 INJECTION SUBCUTANEOUS at 09:05

## 2024-05-18 RX ADMIN — POLYETHYLENE GLYCOL 3350 17 G: 17 POWDER, FOR SOLUTION ORAL at 09:05

## 2024-05-18 RX ADMIN — ACETAMINOPHEN 325MG 650 MG: 325 TABLET ORAL at 04:05

## 2024-05-18 RX ADMIN — GABAPENTIN 400 MG: 400 CAPSULE ORAL at 12:05

## 2024-05-18 RX ADMIN — ACETAMINOPHEN 650 MG: 325 TABLET ORAL at 06:05

## 2024-05-18 RX ADMIN — METHOCARBAMOL TABLETS 750 MG: 750 TABLET, COATED ORAL at 06:05

## 2024-05-18 RX ADMIN — ENOXAPARIN SODIUM 40 MG: 40 INJECTION SUBCUTANEOUS at 08:05

## 2024-05-18 RX ADMIN — NICOTINE 1 PATCH: 21 PATCH, EXTENDED RELEASE TRANSDERMAL at 08:05

## 2024-05-18 RX ADMIN — METHOCARBAMOL 1000 MG: 500 TABLET ORAL at 12:05
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
## 2024-05-18 NOTE — PLAN OF CARE
Problem: Acute Kidney Injury/Impairment  Goal: Fluid and Electrolyte Balance  Outcome: Progressing     Problem: Rehabilitation (IRF) Plan of Care  Goal: Absence of New-Onset Illness or Injury  Outcome: Progressing     Problem: Fall Injury Risk  Goal: Absence of Fall and Fall-Related Injury  Outcome: Progressing     Problem: Wound  Goal: Improved Oral Intake  Outcome: Progressing

## 2024-05-19 PROCEDURE — 97535 SELF CARE MNGMENT TRAINING: CPT

## 2024-05-19 PROCEDURE — 99233 SBSQ HOSP IP/OBS HIGH 50: CPT | Mod: ,,, | Performed by: NURSE PRACTITIONER

## 2024-05-19 PROCEDURE — 11800000 HC REHAB PRIVATE ROOM

## 2024-05-19 PROCEDURE — 25000003 PHARM REV CODE 250: Performed by: NURSE PRACTITIONER

## 2024-05-19 PROCEDURE — 63600175 PHARM REV CODE 636 W HCPCS: Performed by: NURSE PRACTITIONER

## 2024-05-19 PROCEDURE — S4991 NICOTINE PATCH NONLEGEND: HCPCS | Performed by: NURSE PRACTITIONER

## 2024-05-19 PROCEDURE — 97542 WHEELCHAIR MNGMENT TRAINING: CPT

## 2024-05-19 PROCEDURE — 99900031 HC PATIENT EDUCATION (STAT)

## 2024-05-19 PROCEDURE — 97530 THERAPEUTIC ACTIVITIES: CPT

## 2024-05-19 PROCEDURE — 97116 GAIT TRAINING THERAPY: CPT

## 2024-05-19 PROCEDURE — 94799 UNLISTED PULMONARY SVC/PX: CPT | Mod: XB

## 2024-05-19 PROCEDURE — 94761 N-INVAS EAR/PLS OXIMETRY MLT: CPT

## 2024-05-19 RX ADMIN — NICOTINE 1 PATCH: 21 PATCH, EXTENDED RELEASE TRANSDERMAL at 08:05

## 2024-05-19 RX ADMIN — ENOXAPARIN SODIUM 40 MG: 40 INJECTION SUBCUTANEOUS at 09:05

## 2024-05-19 RX ADMIN — GABAPENTIN 400 MG: 400 CAPSULE ORAL at 11:05

## 2024-05-19 RX ADMIN — POLYETHYLENE GLYCOL 3350 17 G: 17 POWDER, FOR SOLUTION ORAL at 07:05

## 2024-05-19 RX ADMIN — DOCUSATE SODIUM 100 MG: 100 CAPSULE, LIQUID FILLED ORAL at 09:05

## 2024-05-19 RX ADMIN — OXYCODONE 10 MG: 5 TABLET ORAL at 09:05

## 2024-05-19 RX ADMIN — OXYCODONE 10 MG: 5 TABLET ORAL at 04:05

## 2024-05-19 RX ADMIN — METHOCARBAMOL 1000 MG: 500 TABLET ORAL at 11:05

## 2024-05-19 RX ADMIN — DOCUSATE SODIUM 100 MG: 100 CAPSULE, LIQUID FILLED ORAL at 07:05

## 2024-05-19 RX ADMIN — OXYCODONE 10 MG: 5 TABLET ORAL at 05:05

## 2024-05-19 RX ADMIN — DULOXETINE HYDROCHLORIDE 30 MG: 30 CAPSULE, DELAYED RELEASE ORAL at 07:05

## 2024-05-19 RX ADMIN — GABAPENTIN 400 MG: 400 CAPSULE ORAL at 04:05

## 2024-05-19 RX ADMIN — ACETAMINOPHEN 325MG 650 MG: 325 TABLET ORAL at 11:05

## 2024-05-19 RX ADMIN — GABAPENTIN 400 MG: 400 CAPSULE ORAL at 05:05

## 2024-05-19 RX ADMIN — METHOCARBAMOL 1000 MG: 500 TABLET ORAL at 05:05

## 2024-05-19 RX ADMIN — ENOXAPARIN SODIUM 40 MG: 40 INJECTION SUBCUTANEOUS at 07:05

## 2024-05-19 RX ADMIN — METHOCARBAMOL 1000 MG: 500 TABLET ORAL at 04:05

## 2024-05-19 RX ADMIN — METHOCARBAMOL 1000 MG: 500 TABLET ORAL at 12:05

## 2024-05-19 RX ADMIN — ACETAMINOPHEN 325MG 650 MG: 325 TABLET ORAL at 04:05

## 2024-05-19 RX ADMIN — OXYCODONE 10 MG: 5 TABLET ORAL at 01:05

## 2024-05-19 RX ADMIN — ACETAMINOPHEN 325MG 650 MG: 325 TABLET ORAL at 12:05

## 2024-05-19 RX ADMIN — HYDROXYZINE PAMOATE 50 MG: 50 CAPSULE ORAL at 09:05

## 2024-05-19 RX ADMIN — NALOXEGOL OXALATE 25 MG: 25 TABLET, FILM COATED ORAL at 07:05

## 2024-05-19 NOTE — PT/OT/SLP PROGRESS
"Physical Therapy      Patient Name:  Venkata Douglas   MRN:  [id number]    Patient not seen today secondary to patient unwilling to participate .   Attempted to see Pt at 10:45, pt was asleep and agreed to participate in 1 hour. Returned to pt room  at 11:45, receiving meds from [facility]. Pt stated "why are you back?". Max effort to get patient to participate in therapy today, and he continuously stated that he would rather sleep in bed. Reiterated to pt that staying in bed would not help him get better, to which he stated, "I think I'm doing pretty good anyway." Pt left in bed with all needs in reach.    Amount of therapy minutes missed:  60 Minutes.      5/19/2024  "
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
## 2024-05-19 NOTE — PLAN OF CARE
Problem: Acute Kidney Injury/Impairment  Goal: Fluid and Electrolyte Balance  Outcome: Progressing  Goal: Improved Oral Intake  Outcome: Progressing  Goal: Effective Renal Function  Outcome: Progressing     Problem: Rehabilitation (IRF) Plan of Care  Goal: Plan of Care Review  Outcome: Progressing  Goal: Patient-Specific Goal (Individualized)  Outcome: Progressing  Goal: Absence of New-Onset Illness or Injury  Outcome: Progressing  Goal: Optimal Comfort and Wellbeing  Outcome: Progressing  Goal: Home and Community Transition Plan Established  Outcome: Progressing     Problem: Infection  Goal: Absence of Infection Signs and Symptoms  Outcome: Progressing     Problem: Wound  Goal: Optimal Coping  Outcome: Progressing  Goal: Optimal Functional Ability  Outcome: Progressing  Goal: Absence of Infection Signs and Symptoms  Outcome: Progressing  Goal: Improved Oral Intake  Outcome: Progressing  Goal: Optimal Pain Control and Function  Outcome: Progressing  Goal: Skin Health and Integrity  Outcome: Progressing  Goal: Optimal Wound Healing  Outcome: Progressing     Problem: Fall Injury Risk  Goal: Absence of Fall and Fall-Related Injury  Outcome: Progressing     Problem: Skin Injury Risk Increased  Goal: Skin Health and Integrity  Outcome: Progressing     Problem: Pain Acute  Goal: Optimal Pain Control and Function  Outcome: Progressing
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
## 2024-05-20 LAB
ALBUMIN SERPL-MCNC: 3.4 G/DL (ref 3.5–5)
ALBUMIN/GLOB SERPL: 0.9 RATIO (ref 1.1–2)
ALP SERPL-CCNC: 201 UNIT/L (ref 40–150)
ALT SERPL-CCNC: 74 UNIT/L (ref 0–55)
ANION GAP SERPL CALC-SCNC: 10 MEQ/L
AST SERPL-CCNC: 21 UNIT/L (ref 5–34)
BASOPHILS # BLD AUTO: 0.08 X10(3)/MCL
BASOPHILS NFR BLD AUTO: 0.7 %
BILIRUB SERPL-MCNC: 0.8 MG/DL
BUN SERPL-MCNC: 16.4 MG/DL (ref 8.9–20.6)
CALCIUM SERPL-MCNC: 9.3 MG/DL (ref 8.4–10.2)
CHLORIDE SERPL-SCNC: 103 MMOL/L (ref 98–107)
CO2 SERPL-SCNC: 24 MMOL/L (ref 22–29)
CREAT SERPL-MCNC: 0.62 MG/DL (ref 0.73–1.18)
CREAT/UREA NIT SERPL: 26
EOSINOPHIL # BLD AUTO: 0.35 X10(3)/MCL (ref 0–0.9)
EOSINOPHIL NFR BLD AUTO: 2.9 %
ERYTHROCYTE [DISTWIDTH] IN BLOOD BY AUTOMATED COUNT: 14.2 % (ref 11.5–17)
GFR SERPLBLD CREATININE-BSD FMLA CKD-EPI: >60 ML/MIN/1.73/M2
GLOBULIN SER-MCNC: 3.7 GM/DL (ref 2.4–3.5)
GLUCOSE SERPL-MCNC: 102 MG/DL (ref 74–100)
HCT VFR BLD AUTO: 29.6 % (ref 42–52)
HGB BLD-MCNC: 9.4 G/DL (ref 14–18)
IMM GRANULOCYTES # BLD AUTO: 0.35 X10(3)/MCL (ref 0–0.04)
IMM GRANULOCYTES NFR BLD AUTO: 2.9 %
LYMPHOCYTES # BLD AUTO: 1.48 X10(3)/MCL (ref 0.6–4.6)
LYMPHOCYTES NFR BLD AUTO: 12.2 %
MAGNESIUM SERPL-MCNC: 2.2 MG/DL (ref 1.6–2.6)
MCH RBC QN AUTO: 30.3 PG (ref 27–31)
MCHC RBC AUTO-ENTMCNC: 31.8 G/DL (ref 33–36)
MCV RBC AUTO: 95.5 FL (ref 80–94)
MONOCYTES # BLD AUTO: 0.81 X10(3)/MCL (ref 0.1–1.3)
MONOCYTES NFR BLD AUTO: 6.7 %
NEUTROPHILS # BLD AUTO: 9.05 X10(3)/MCL (ref 2.1–9.2)
NEUTROPHILS NFR BLD AUTO: 74.6 %
NRBC BLD AUTO-RTO: 0.5 %
PHOSPHATE SERPL-MCNC: 4 MG/DL (ref 2.3–4.7)
PLATELET # BLD AUTO: 588 X10(3)/MCL (ref 130–400)
PMV BLD AUTO: 8.9 FL (ref 7.4–10.4)
POTASSIUM SERPL-SCNC: 4.1 MMOL/L (ref 3.5–5.1)
PREALB SERPL-MCNC: 27.8 MG/DL (ref 18–45)
PROT SERPL-MCNC: 7.1 GM/DL (ref 6.4–8.3)
RBC # BLD AUTO: 3.1 X10(6)/MCL (ref 4.7–6.1)
SODIUM SERPL-SCNC: 137 MMOL/L (ref 136–145)
WBC # SPEC AUTO: 12.12 X10(3)/MCL (ref 4.5–11.5)

## 2024-05-20 PROCEDURE — 85025 COMPLETE CBC W/AUTO DIFF WBC: CPT | Performed by: NURSE PRACTITIONER

## 2024-05-20 PROCEDURE — 25000003 PHARM REV CODE 250: Performed by: NURSE PRACTITIONER

## 2024-05-20 PROCEDURE — 99900031 HC PATIENT EDUCATION (STAT)

## 2024-05-20 PROCEDURE — 97110 THERAPEUTIC EXERCISES: CPT

## 2024-05-20 PROCEDURE — 11800000 HC REHAB PRIVATE ROOM

## 2024-05-20 PROCEDURE — 99900035 HC TECH TIME PER 15 MIN (STAT)

## 2024-05-20 PROCEDURE — 97110 THERAPEUTIC EXERCISES: CPT | Mod: CQ

## 2024-05-20 PROCEDURE — 63600175 PHARM REV CODE 636 W HCPCS: Performed by: NURSE PRACTITIONER

## 2024-05-20 PROCEDURE — 97530 THERAPEUTIC ACTIVITIES: CPT

## 2024-05-20 PROCEDURE — 36415 COLL VENOUS BLD VENIPUNCTURE: CPT | Performed by: NURSE PRACTITIONER

## 2024-05-20 PROCEDURE — S4991 NICOTINE PATCH NONLEGEND: HCPCS | Performed by: NURSE PRACTITIONER

## 2024-05-20 PROCEDURE — 84134 ASSAY OF PREALBUMIN: CPT | Performed by: NURSE PRACTITIONER

## 2024-05-20 PROCEDURE — 94799 UNLISTED PULMONARY SVC/PX: CPT | Mod: XB

## 2024-05-20 PROCEDURE — 83735 ASSAY OF MAGNESIUM: CPT | Performed by: NURSE PRACTITIONER

## 2024-05-20 PROCEDURE — 97116 GAIT TRAINING THERAPY: CPT

## 2024-05-20 PROCEDURE — 80053 COMPREHEN METABOLIC PANEL: CPT | Performed by: NURSE PRACTITIONER

## 2024-05-20 PROCEDURE — 84100 ASSAY OF PHOSPHORUS: CPT | Performed by: NURSE PRACTITIONER

## 2024-05-20 RX ADMIN — GABAPENTIN 400 MG: 400 CAPSULE ORAL at 06:05

## 2024-05-20 RX ADMIN — METHOCARBAMOL 1000 MG: 500 TABLET ORAL at 06:05

## 2024-05-20 RX ADMIN — DOCUSATE SODIUM 100 MG: 100 CAPSULE, LIQUID FILLED ORAL at 08:05

## 2024-05-20 RX ADMIN — POLYETHYLENE GLYCOL 3350 17 G: 17 POWDER, FOR SOLUTION ORAL at 08:05

## 2024-05-20 RX ADMIN — NALOXEGOL OXALATE 25 MG: 25 TABLET, FILM COATED ORAL at 08:05

## 2024-05-20 RX ADMIN — GABAPENTIN 400 MG: 400 CAPSULE ORAL at 05:05

## 2024-05-20 RX ADMIN — ACETAMINOPHEN 325MG 650 MG: 325 TABLET ORAL at 12:05

## 2024-05-20 RX ADMIN — ENOXAPARIN SODIUM 40 MG: 40 INJECTION SUBCUTANEOUS at 08:05

## 2024-05-20 RX ADMIN — OXYCODONE 10 MG: 5 TABLET ORAL at 08:05

## 2024-05-20 RX ADMIN — OXYCODONE 10 MG: 5 TABLET ORAL at 03:05

## 2024-05-20 RX ADMIN — HYDROXYZINE PAMOATE 50 MG: 50 CAPSULE ORAL at 08:05

## 2024-05-20 RX ADMIN — METHOCARBAMOL 1000 MG: 500 TABLET ORAL at 05:05

## 2024-05-20 RX ADMIN — GABAPENTIN 400 MG: 400 CAPSULE ORAL at 12:05

## 2024-05-20 RX ADMIN — NICOTINE 1 PATCH: 21 PATCH, EXTENDED RELEASE TRANSDERMAL at 08:05

## 2024-05-20 RX ADMIN — DULOXETINE HYDROCHLORIDE 30 MG: 30 CAPSULE, DELAYED RELEASE ORAL at 08:05

## 2024-05-20 RX ADMIN — ACETAMINOPHEN 325MG 650 MG: 325 TABLET ORAL at 03:05

## 2024-05-20 RX ADMIN — ACETAMINOPHEN 325MG 650 MG: 325 TABLET ORAL at 05:05

## 2024-05-20 RX ADMIN — METHOCARBAMOL 1000 MG: 500 TABLET ORAL at 12:05

## 2024-05-20 NOTE — PROGRESS NOTES
05/20/24 1045   Rec Therapy Time Calculation   Date of Treatment 05/20/24   Rec Start Time 1045   Rec Stop Time 1100   Rec Total Time (min) 15 min   Amount of Missed Time 15 Minutes   Missed Treatment Reason Other (Comment)  (Staffing went over)   Time   Treatment time 1 units   Charges   $Therapeutic Activity 1unit   Precautions   General Precautions fall   Orthopedic Precautions  RUE non weight bearing;LUE non weight bearing;RLE non weight bearing   Braces N/A   Pain/Comfort   Pain Rating 1 no pain   OTHER   Rehab identified problem list/impairments weakness;impaired endurance;impaired functional mobility;gait instability;decreased coordination;decreased upper extremity function;decreased lower extremity function;decreased safety awareness   Values/Beliefs/Spiritual Care   Spiritual, Cultural Beliefs, Confucianist Practices, Values that Affect Care no   Overall Level of Functioning   Activity Tolerance Independent   Dynamic Sitting Balance/Reaching Independent   Dynamic Standing Balance/Reaching Mod Indep   Right UE Coodination/Dexterity Standby Assist   Left UE Coordination/Dexterity Standby Assist   Problem Solving/Sequencing Skills Mod Indep   Memory Recall Mod Indep   R/L Neglect/Inattention Does not occur   Attention Span Mod Indep   Social Interaction Mod Indep   Recreational Therapy Short Term Goals   Short Term Goal 1 Progression Met   Short Term Goal 2 Progression Met   Recreational Therapy Long Term Goals   Long Term Goal 1 Progression Met   Long Term Goal 2 Progression Progressing   Plan   Patient to be seen Daily   Planned Duration Other (Comment)  (3 days)   Treatments Planned Coordination;Fine motor;Energy conservation training;Safety education   Treatment plan/goals estblished with Patient/Caregiver Yes

## 2024-05-20 NOTE — PT/OT/SLP PROGRESS
Recreational Therapy Treatment    Date of Treatment: 05/20/24  Start Time: 1045  Stop Time: 1100  Total Time: 15 min  Missed Time: 15 Minutes due to Other (Comment) (Staffing went over)    General Precautions: fall  Ortho Precautions: RUE non weight bearing, LUE non weight bearing, RLE non weight bearing  Braces: N/A    Vitals   Vitals at Rest  BP    HR    O2 Sat    Pain      Vitals With Activity  BP    HR    O2 Sat    Pain        Treatment     Cognitive Skills Building   Cognitive Observation Activity Assist Position Equipment Response            Comment:          Dynamic Activities   Activity Assist Position Equipment Response   Activity 1 Bean bag toos modified independence and supervision Standing Platform walker and Bean bags good   Comment: Sit to stand was supervision/setup as was dynamic standing balance/reaching.  Standing tolerance increased to 10 minutes.  UE coordination was supervision/setup.  Problem solving and sequencing were setup/I.  More alert and willing to participate.  Was playfully competitive with Dr Hastings       Fine Motor Activities   Activity Assist Position Equipment Response           Comment:          Additional Info: Pt progress, plan of care and discharge plans were discussed during staffing at 0930    This was a co-treat with OT    Goals     Short Term Goals    Goal  Goal Status   Will increase sit to stand to supervision Met   Will improve dynamic standing balance/reaching to supervision Met                 Long Term Goals    Goal Goal Status   Will increase standing tolerance to 5,10 minutes Met   Will improve dynamic standing balance/reaching to setup Progressing
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
## 2024-05-20 NOTE — PLAN OF CARE
"Received call from mother, Glo, indicating that the pt has 2 phone interviews scheduled for SNAP benefits and Social Security Disability (5/21 @ noon and 5/23 at 0900).  I then relayed to therapy manager, Jada, for her to schedule therapies around these dates/times.    Glo and I also discussed pt's stubbornness and his refusals of some therapy sessions and his desire to go home ASAP.  She relayed that this has always been his personality--to do things his way and to not consider other ways presented to him.  She is adamant about pt at least being able to handle his own hygiene and toileting issues before he is released, as his grandparents are elderly and should not have to "wipe his butt."      Discussed pt's not having an established PCP.  She indicated that he had a pediatrician he has not seen since he was around 22 years old.  She would like to see if her own NP, Laurel Collins in [place], would also accept patient.  Asked  Armand to call to inquire.    Also relayed to Glo our desire to see pictures of pt's home entranceway and WIS.  She will work on that request in coordination with pt's father (it is his parents' home at which pt lives).    "

## 2024-05-21 PROCEDURE — 94761 N-INVAS EAR/PLS OXIMETRY MLT: CPT

## 2024-05-21 PROCEDURE — 97530 THERAPEUTIC ACTIVITIES: CPT | Mod: CQ

## 2024-05-21 PROCEDURE — 11800000 HC REHAB PRIVATE ROOM

## 2024-05-21 PROCEDURE — S4991 NICOTINE PATCH NONLEGEND: HCPCS | Performed by: NURSE PRACTITIONER

## 2024-05-21 PROCEDURE — 94799 UNLISTED PULMONARY SVC/PX: CPT | Mod: XB

## 2024-05-21 PROCEDURE — 94799 UNLISTED PULMONARY SVC/PX: CPT

## 2024-05-21 PROCEDURE — 99233 SBSQ HOSP IP/OBS HIGH 50: CPT | Mod: ,,, | Performed by: NURSE PRACTITIONER

## 2024-05-21 PROCEDURE — 63600175 PHARM REV CODE 636 W HCPCS: Performed by: NURSE PRACTITIONER

## 2024-05-21 PROCEDURE — 99900031 HC PATIENT EDUCATION (STAT)

## 2024-05-21 PROCEDURE — 97110 THERAPEUTIC EXERCISES: CPT | Mod: CQ

## 2024-05-21 PROCEDURE — 97110 THERAPEUTIC EXERCISES: CPT

## 2024-05-21 PROCEDURE — 97116 GAIT TRAINING THERAPY: CPT | Mod: CQ

## 2024-05-21 PROCEDURE — 25000003 PHARM REV CODE 250: Performed by: NURSE PRACTITIONER

## 2024-05-21 PROCEDURE — 97530 THERAPEUTIC ACTIVITIES: CPT

## 2024-05-21 RX ADMIN — ACETAMINOPHEN 325MG 650 MG: 325 TABLET ORAL at 12:05

## 2024-05-21 RX ADMIN — CAMPHOR, MENTHOL: .5; .5 LOTION TOPICAL at 02:05

## 2024-05-21 RX ADMIN — DULOXETINE HYDROCHLORIDE 30 MG: 30 CAPSULE, DELAYED RELEASE ORAL at 08:05

## 2024-05-21 RX ADMIN — HYDROXYZINE PAMOATE 50 MG: 50 CAPSULE ORAL at 08:05

## 2024-05-21 RX ADMIN — ACETAMINOPHEN 325MG 650 MG: 325 TABLET ORAL at 05:05

## 2024-05-21 RX ADMIN — ENOXAPARIN SODIUM 40 MG: 40 INJECTION SUBCUTANEOUS at 08:05

## 2024-05-21 RX ADMIN — METHOCARBAMOL 1000 MG: 500 TABLET ORAL at 05:05

## 2024-05-21 RX ADMIN — GABAPENTIN 400 MG: 400 CAPSULE ORAL at 06:05

## 2024-05-21 RX ADMIN — NALOXEGOL OXALATE 25 MG: 25 TABLET, FILM COATED ORAL at 08:05

## 2024-05-21 RX ADMIN — METHOCARBAMOL 1000 MG: 500 TABLET ORAL at 12:05

## 2024-05-21 RX ADMIN — CAMPHOR, MENTHOL: .5; .5 LOTION TOPICAL at 08:05

## 2024-05-21 RX ADMIN — GABAPENTIN 400 MG: 400 CAPSULE ORAL at 05:05

## 2024-05-21 RX ADMIN — ACETAMINOPHEN 325MG 650 MG: 325 TABLET ORAL at 11:05

## 2024-05-21 RX ADMIN — METHOCARBAMOL 1000 MG: 500 TABLET ORAL at 11:05

## 2024-05-21 RX ADMIN — POLYETHYLENE GLYCOL 3350 17 G: 17 POWDER, FOR SOLUTION ORAL at 08:05

## 2024-05-21 RX ADMIN — DOCUSATE SODIUM 100 MG: 100 CAPSULE, LIQUID FILLED ORAL at 08:05

## 2024-05-21 RX ADMIN — NICOTINE 1 PATCH: 21 PATCH, EXTENDED RELEASE TRANSDERMAL at 08:05

## 2024-05-21 RX ADMIN — GABAPENTIN 400 MG: 400 CAPSULE ORAL at 11:05

## 2024-05-21 NOTE — PLAN OF CARE
Problem: Infection  Goal: Absence of Infection Signs and Symptoms  Outcome: Progressing     Problem: Wound  Goal: Optimal Coping  Outcome: Progressing     Problem: Fall Injury Risk  Goal: Absence of Fall and Fall-Related Injury  Outcome: Progressing     Problem: Skin Injury Risk Increased  Goal: Skin Health and Integrity  Outcome: Progressing     Problem: Pain Acute  Goal: Optimal Pain Control and Function  Outcome: Progressing

## 2024-05-21 NOTE — PT/OT/SLP PROGRESS
Recreational Therapy Treatment    Date of Treatment: 05/21/24  Start Time: 1031  Stop Time: 1100  Total Time: 29 min  Missed Time:     General Precautions: fall  Ortho Precautions: RUE non weight bearing, LUE non weight bearing, RLE non weight bearing, LLE weight bearing as tolerated  Braces: N/A    Vitals   Vitals at Rest  BP    HR    O2 Sat    Pain      Vitals With Activity  BP    HR    O2 Sat    Pain        Treatment     Cognitive Skills Building   Cognitive Observation Activity Assist Position Equipment Response            Comment:          Dynamic Activities   Activity Assist Position Equipment Response   Activity 1 Washer toss modified independence Standing Platform walker, Metal washers good   Comment: Sit to stand was setup as was dynamic standing balance/reaching.  Standing tolerance increased to 20 minutes.  UE coordination increased to I as were sequencing skills and problem solving skills.  He was more animated and initiated conversation. Smiling more and participated willingly.       Fine Motor Activities   Activity Assist Position Equipment Response           Comment:          Additional Info: This was a co-treat with PTA    Goals     Short Term Goals    Goal  Goal Status   Will increase sit to stand to supervision Met   Will improve dynamic standing balance/reaching to supervision Met                 Long Term Goals    Goal Goal Status   Will increase standing tolerance to 5,10 minutes Met   Will improve dynamic standing balance/reaching to setup Met

## 2024-05-21 NOTE — PLAN OF CARE
Received letter from [employer] insurance indicating that medical will not be covered under the policy.  Forwarded to [employer] via [employer] for them to consider providing DME.    1459:  Spoke with Jennifer Motley ([phone]).  She submitted for prior auth from [facility] for Thursday's discharge.
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
## 2024-05-21 NOTE — PROGRESS NOTES
[facility] - Rehab Inpatient Services  Wound Care    Patient Name:  Venkata Douglas   MRN:  [id number]  Date: 5/21/2024  Diagnosis: Critical polytrauma    History:     No past medical history on file.    Social History     Socioeconomic History    Marital status: Single     Social Determinants of Health     Transportation Needs: No Transportation Needs (5/16/2024)    PRAPARE - Transportation     Lack of Transportation (Medical): No     Lack of Transportation (Non-Medical): No       Precautions:     Allergies as of 05/15/2024    (No Known Allergies)       WO Assessment Details/Treatment        05/21/24 1437        Wound 05/08/24 1150 Abrasion(s) Right lateral Chest   Date First Assessed/Time First Assessed: 05/08/24 1150   Present on Original Admission: Yes  Primary Wound Type: (c) Abrasion(s)  Side: Right  Orientation: lateral  Location: Chest  Is this injury device related?: Yes   Dressing Appearance Open to air   Appearance Pink;Dry  (almost healed)        Wound 05/08/24 1150 Abrasion(s) Left Lower quadrant   Date First Assessed/Time First Assessed: 05/08/24 1150   Present on Original Admission: Yes  Primary Wound Type: (c) Abrasion(s)  Side: Left  Location: Lower quadrant  Is this injury device related?: Yes   Wound Image    Dressing Appearance Clean;Intact;Dry   Drainage Amount None   Wound Length (cm) 0.5 cm   Wound Width (cm) 2.5 cm   Wound Surface Area (cm^2) 1.25 cm^2   Care Applied:   Dressing Hydrocolloid   Dressing Change Due 05/28/24        Wound 05/08/24 1150 Other (comment) Right anterior;distal;lower Arm   Date First Assessed/Time First Assessed: 05/08/24 1150   Present on Original Admission: Yes  Primary Wound Type: (c) Other (comment)  Side: Right  Orientation: anterior;distal;lower  Location: Arm  Is this injury device related?: Yes   Dressing Cast padding;Compression wrap        Wound 05/08/24 1150 Puncture Right anterior Ankle   Date First Assessed/Time First Assessed: 05/08/24  1150   Present on Original Admission: Yes  Primary Wound Type: Puncture  Side: Right  Orientation: anterior  Location: Ankle  Is this injury device related?: Yes   Dressing Appearance Intact   Dressing Cast padding;Elastic bandage        Wound 05/08/24 2044 Incision Right lower;lateral;distal Leg   Date First Assessed/Time First Assessed: 05/08/24 2044   Present on Original Admission: No  Primary Wound Type: Incision  Side: Right  Orientation: lower;lateral;distal  Location: Leg  Closure Method: Staples  Additional Comments: xeroform; 4x4 webril...   Dressing Appearance Dry;Intact   Dressing Elastic bandage;Compression wrap        Wound 05/08/24 2045 Incision Right anterior Foot other (see comments)   Date First Assessed/Time First Assessed: 05/08/24 2045   Present on Original Admission: No  Primary Wound Type: Incision  Side: Right  Orientation: anterior  Location: Foot  Incision Type: (c) other (see comments)  Additional Comments: right 3rd,4th a...   Dressing Appearance Intact;Dry   Dressing Cast padding;Compression wrap        Wound 05/09/24 1553 Incision Right anterior Knee   Date First Assessed/Time First Assessed: 05/09/24 1553   Present on Original Admission: No  Primary Wound Type: Incision  Side: Right  Orientation: anterior  Location: Knee  Closure Method: Staples  Additional Comments: RIGHT THIGH PUNCTURE INCISIONS:...   Dressing Appearance   (loose)   Care   (re wrapped ACE wrap that was loose/falling  and out of place)   Dressing Cast padding;Compression wrap        Wound 05/09/24 1700 Incision Right lower;medial Arm   Date First Assessed/Time First Assessed: 05/09/24 1700   Present on Original Admission: No  Primary Wound Type: Incision  Side: Right  Orientation: lower;medial  Location: Arm  Closure Method: Sutures  Additional Comments: 4X4; XEROFORM; WEBRIL; SPLIN...   Dressing Appearance Dry;Intact   Dressing Cast padding;Elastic bandage     Bilateral arms and RLE remain with splints,cast padding  and ACE wrap. See surgeon notes for date of removal of splints.  LLQ abrasion: d/c gauze and applied hydrocolloid for q  7 day dressing change. Wound bed is yellow and pink. Next dressing change is due 5/28.  Right lateral chest abrasions are almost healed - dry pink wound beds.  Sarna lotion ordered for c/o itching in multiple areas. DO NOT APPLY TO INCISIONS.    05/21/2024
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
## 2024-05-21 NOTE — PROGRESS NOTES
05/21/24 1031   Rec Therapy Time Calculation   Date of Treatment 05/21/24   Rec Start Time 1031   Rec Stop Time 1100   Rec Total Time (min) 29 min   Time   Treatment time 2 units   Charges   $Therapeutic Activity 1unit   Precautions   General Precautions fall   Orthopedic Precautions  RUE non weight bearing;LUE non weight bearing;RLE non weight bearing;LLE weight bearing as tolerated   Braces N/A   Pain/Comfort   Pain Rating 1 no pain   OTHER   Rehab identified problem list/impairments weakness;impaired functional mobility;gait instability;decreased coordination;decreased upper extremity function;decreased lower extremity function;decreased safety awareness;impaired coordination;decreased ROM;impaired fine motor   Values/Beliefs/Spiritual Care   Spiritual, Cultural Beliefs, Judaism Practices, Values that Affect Care no   Overall Level of Functioning   Activity Tolerance Independent   Dynamic Sitting Balance/Reaching Independent   Dynamic Standing Balance/Reaching Mod Indep   Right UE Coodination/Dexterity Independent   Left UE Coordination/Dexterity Independent   Problem Solving/Sequencing Skills Independent   Memory Recall Independent   R/L Neglect/Inattention Does not occur   Attention Span Independent   Social Interaction Independent   Recreational Therapy Short Term Goals   Short Term Goal 1 Progression Met   Short Term Goal 2 Progression Met   Recreational Therapy Long Term Goals   Long Term Goal 1 Progression Met   Long Term Goal 2 Progression Met   Plan   Patient to be seen Daily   Planned Duration Other (Comment)  (2 days)   Treatments Planned Energy conservation training;Coordination;Fine motor;Safety education   Treatment plan/goals estblished with Patient/Caregiver Yes

## 2024-05-21 NOTE — PLAN OF CARE
Problem: Wound  Goal: Optimal Coping  Outcome: Progressing  Goal: Optimal Pain Control and Function  Outcome: Progressing     Problem: Pain Acute  Goal: Optimal Pain Control and Function  Outcome: Progressing
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
## 2024-05-22 PROCEDURE — 11800000 HC REHAB PRIVATE ROOM

## 2024-05-22 PROCEDURE — 99900031 HC PATIENT EDUCATION (STAT)

## 2024-05-22 PROCEDURE — 25000003 PHARM REV CODE 250: Performed by: NURSE PRACTITIONER

## 2024-05-22 PROCEDURE — 94799 UNLISTED PULMONARY SVC/PX: CPT | Mod: XB

## 2024-05-22 PROCEDURE — 97168 OT RE-EVAL EST PLAN CARE: CPT

## 2024-05-22 PROCEDURE — 99900035 HC TECH TIME PER 15 MIN (STAT)

## 2024-05-22 PROCEDURE — 97530 THERAPEUTIC ACTIVITIES: CPT

## 2024-05-22 PROCEDURE — 97110 THERAPEUTIC EXERCISES: CPT

## 2024-05-22 PROCEDURE — 97164 PT RE-EVAL EST PLAN CARE: CPT

## 2024-05-22 PROCEDURE — 97535 SELF CARE MNGMENT TRAINING: CPT

## 2024-05-22 PROCEDURE — S4991 NICOTINE PATCH NONLEGEND: HCPCS | Performed by: NURSE PRACTITIONER

## 2024-05-22 PROCEDURE — 94761 N-INVAS EAR/PLS OXIMETRY MLT: CPT

## 2024-05-22 PROCEDURE — 97535 SELF CARE MNGMENT TRAINING: CPT | Mod: CQ

## 2024-05-22 PROCEDURE — 63600175 PHARM REV CODE 636 W HCPCS: Performed by: NURSE PRACTITIONER

## 2024-05-22 RX ADMIN — ACETAMINOPHEN 325MG 650 MG: 325 TABLET ORAL at 12:05

## 2024-05-22 RX ADMIN — OXYCODONE 10 MG: 5 TABLET ORAL at 12:05

## 2024-05-22 RX ADMIN — METHOCARBAMOL 1000 MG: 500 TABLET ORAL at 05:05

## 2024-05-22 RX ADMIN — ENOXAPARIN SODIUM 40 MG: 40 INJECTION SUBCUTANEOUS at 08:05

## 2024-05-22 RX ADMIN — OXYCODONE 5 MG: 5 TABLET ORAL at 08:05

## 2024-05-22 RX ADMIN — CAMPHOR, MENTHOL: .5; .5 LOTION TOPICAL at 05:05

## 2024-05-22 RX ADMIN — NICOTINE 1 PATCH: 21 PATCH, EXTENDED RELEASE TRANSDERMAL at 08:05

## 2024-05-22 RX ADMIN — DOCUSATE SODIUM 100 MG: 100 CAPSULE, LIQUID FILLED ORAL at 08:05

## 2024-05-22 RX ADMIN — HYDROXYZINE PAMOATE 50 MG: 50 CAPSULE ORAL at 08:05

## 2024-05-22 RX ADMIN — GABAPENTIN 400 MG: 400 CAPSULE ORAL at 12:05

## 2024-05-22 RX ADMIN — DULOXETINE HYDROCHLORIDE 30 MG: 30 CAPSULE, DELAYED RELEASE ORAL at 08:05

## 2024-05-22 RX ADMIN — ACETAMINOPHEN 325MG 650 MG: 325 TABLET ORAL at 05:05

## 2024-05-22 RX ADMIN — METHOCARBAMOL 1000 MG: 500 TABLET ORAL at 12:05

## 2024-05-22 RX ADMIN — GABAPENTIN 400 MG: 400 CAPSULE ORAL at 05:05

## 2024-05-22 RX ADMIN — NALOXEGOL OXALATE 25 MG: 25 TABLET, FILM COATED ORAL at 08:05

## 2024-05-22 RX ADMIN — CAMPHOR, MENTHOL: .5; .5 LOTION TOPICAL at 08:05

## 2024-05-22 RX ADMIN — POLYETHYLENE GLYCOL 3350 17 G: 17 POWDER, FOR SOLUTION ORAL at 08:05
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
## 2024-05-22 NOTE — PT/OT/SLP PROGRESS
Physical Therapy Inpatient Rehab Treatment    Patient Name:  Venkata Douglas   MRN:  [id number]    PT last visit completed, pt denied questions/concerns for PT at this time and verbalized feeling ready for scheduled d/c to home tomorrow.    05/22/2024

## 2024-05-22 NOTE — PLAN OF CARE
Added rebecca heightening to pt's w/c order and sent to [facility] via [employer].  Spoke with Tiesha at [phone]:  Provided [employer] handicapped tag form to patient for f/u with [employer].
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
## 2024-05-22 NOTE — PROGRESS NOTES
05/22/24 1030   Rec Therapy Time Calculation   Date of Treatment 05/22/24   Rec Start Time 1030   Rec Stop Time 1100   Rec Total Time (min) 30 min   Time   Treatment time 2 units   Charges   $Therapeutic Exercise 2 units   Precautions   General Precautions fall   Orthopedic Precautions  RUE non weight bearing;LUE non weight bearing;RLE non weight bearing   Braces N/A   Pain/Comfort   Pain Rating 1 no pain   OTHER   Rehab identified problem list/impairments weakness;impaired functional mobility;gait instability;decreased lower extremity function;decreased coordination;decreased upper extremity function;decreased safety awareness   Values/Beliefs/Spiritual Care   Spiritual, Cultural Beliefs, Taoist Practices, Values that Affect Care no   Overall Level of Functioning   Activity Tolerance Independent   Dynamic Sitting Balance/Reaching Independent   Dynamic Standing Balance/Reaching Mod Indep   Right UE Coodination/Dexterity Independent   Left UE Coordination/Dexterity Independent   Problem Solving/Sequencing Skills Independent   Memory Recall Independent   R/L Neglect/Inattention Does not occur   Attention Span Independent   Social Interaction Independent   Recreational Therapy Short Term Goals   Short Term Goal 1 Progression Met   Short Term Goal 2 Progression Met   Recreational Therapy Long Term Goals   Long Term Goal 1 Progression Met   Long Term Goal 2 Progression Met   Plan   Patient to be seen Daily   Planned Duration Other (Comment)  (1 day)   Treatments Planned Energy conservation training;Safety education   Treatment plan/goals estblished with Patient/Caregiver Yes

## 2024-05-22 NOTE — PLAN OF CARE
Problem: Occupational Therapy  Goal: Occupational Therapy Goal  Description: ADLs:  Pt to perform grooming and oral hygiene tasks with independence at wheelchair level MET  Pt to perform feeding tasks with independence MET  Pt to perform UB dressing with setup assistance sitting unsupported MET  Pt to perform LB dressing with max A using AE as needed MET  Pt to perform putting on/off footwear task with mod A using AE as needed MET  Pt to perform toileting with max A MET  Pt to perform bathing with min A using AE as needed MET    Functional Transfers:  Pt to perform toilet transfers with min A and BSC over toilet MET  Pt to perform a tub transfer with mod A with TTB and GBs MET  Pt to perform a walk-in shower transfer with least restrictive DME (SC vs TTB) and min A     IADLs:  Pt to perform simple meal prep and light housekeeping with independence at wheelchair level   Outcome: Progressing

## 2024-05-23 VITALS
BODY MASS INDEX: 28.95 KG/M2 | DIASTOLIC BLOOD PRESSURE: 68 MMHG | SYSTOLIC BLOOD PRESSURE: 120 MMHG | TEMPERATURE: 98 F | HEART RATE: 93 BPM | HEIGHT: 63 IN | RESPIRATION RATE: 18 BRPM | OXYGEN SATURATION: 96 % | WEIGHT: 163.38 LBS

## 2024-05-23 PROCEDURE — 25000003 PHARM REV CODE 250: Performed by: NURSE PRACTITIONER

## 2024-05-23 PROCEDURE — 63600175 PHARM REV CODE 636 W HCPCS: Performed by: NURSE PRACTITIONER

## 2024-05-23 RX ORDER — POLYETHYLENE GLYCOL 3350 17 G/17G
17 POWDER, FOR SOLUTION ORAL 2 TIMES DAILY
Qty: 1 EACH | Refills: 0 | Status: SHIPPED | OUTPATIENT
Start: 2024-05-23

## 2024-05-23 RX ORDER — DULOXETIN HYDROCHLORIDE 30 MG/1
30 CAPSULE, DELAYED RELEASE ORAL DAILY
Qty: 30 CAPSULE | Refills: 11 | Status: SHIPPED | OUTPATIENT
Start: 2024-05-23 | End: 2025-05-23

## 2024-05-23 RX ORDER — DOCUSATE SODIUM 100 MG/1
100 CAPSULE, LIQUID FILLED ORAL 2 TIMES DAILY
Qty: 30 CAPSULE | Refills: 0 | Status: SHIPPED | OUTPATIENT
Start: 2024-05-23

## 2024-05-23 RX ORDER — GABAPENTIN 400 MG/1
400 CAPSULE ORAL
Qty: 90 CAPSULE | Refills: 11 | Status: SHIPPED | OUTPATIENT
Start: 2024-05-23 | End: 2024-05-31 | Stop reason: SDUPTHER

## 2024-05-23 RX ORDER — METHOCARBAMOL 1000 MG/1
1000 TABLET, COATED ORAL EVERY 6 HOURS
Qty: 90 TABLET | Refills: 0 | Status: SHIPPED | OUTPATIENT
Start: 2024-05-23 | End: 2024-05-31 | Stop reason: SDUPTHER

## 2024-05-23 RX ORDER — OXYCODONE HYDROCHLORIDE 10 MG/1
10 TABLET ORAL EVERY 6 HOURS PRN
Qty: 12 TABLET | Refills: 0 | Status: SHIPPED | OUTPATIENT
Start: 2024-05-23 | End: 2024-05-31 | Stop reason: SDUPTHER

## 2024-05-23 RX ORDER — ASPIRIN 81 MG/1
81 TABLET ORAL 2 TIMES DAILY
Status: DISCONTINUED | OUTPATIENT
Start: 2024-05-23 | End: 2024-05-23 | Stop reason: HOSPADM

## 2024-05-23 RX ORDER — HYDROXYZINE PAMOATE 50 MG/1
50 CAPSULE ORAL NIGHTLY
Qty: 30 CAPSULE | Refills: 0 | Status: SHIPPED | OUTPATIENT
Start: 2024-05-23

## 2024-05-23 RX ORDER — ASPIRIN 81 MG/1
81 TABLET ORAL 2 TIMES DAILY
Qty: 60 TABLET | Refills: 0 | Status: SHIPPED | OUTPATIENT
Start: 2024-05-23 | End: 2025-05-23

## 2024-05-23 RX ADMIN — METHOCARBAMOL 1000 MG: 500 TABLET ORAL at 05:05

## 2024-05-23 RX ADMIN — METHOCARBAMOL 1000 MG: 500 TABLET ORAL at 12:05

## 2024-05-23 RX ADMIN — ACETAMINOPHEN 325MG 650 MG: 325 TABLET ORAL at 05:05

## 2024-05-23 RX ADMIN — DULOXETINE HYDROCHLORIDE 30 MG: 30 CAPSULE, DELAYED RELEASE ORAL at 09:05

## 2024-05-23 RX ADMIN — DOCUSATE SODIUM 100 MG: 100 CAPSULE, LIQUID FILLED ORAL at 09:05

## 2024-05-23 RX ADMIN — CAMPHOR, MENTHOL: .5; .5 LOTION TOPICAL at 05:05

## 2024-05-23 RX ADMIN — ASPIRIN 81 MG: 81 TABLET, COATED ORAL at 09:05

## 2024-05-23 RX ADMIN — NALOXEGOL OXALATE 25 MG: 25 TABLET, FILM COATED ORAL at 09:05

## 2024-05-23 RX ADMIN — ACETAMINOPHEN 325MG 650 MG: 325 TABLET ORAL at 12:05

## 2024-05-23 RX ADMIN — OXYCODONE 10 MG: 5 TABLET ORAL at 05:05

## 2024-05-23 RX ADMIN — ENOXAPARIN SODIUM 40 MG: 40 INJECTION SUBCUTANEOUS at 08:05

## 2024-05-23 RX ADMIN — GABAPENTIN 400 MG: 400 CAPSULE ORAL at 05:05

## 2024-05-23 NOTE — PLAN OF CARE
Problem: Acute Kidney Injury/Impairment  Goal: Fluid and Electrolyte Balance  Outcome: Progressing  Goal: Improved Oral Intake  Outcome: Progressing  Goal: Effective Renal Function  Outcome: Progressing     Problem: Rehabilitation (IRF) Plan of Care  Goal: Plan of Care Review  Outcome: Progressing  Goal: Patient-Specific Goal (Individualized)  Outcome: Progressing  Goal: Absence of New-Onset Illness or Injury  Outcome: Progressing  Goal: Optimal Comfort and Wellbeing  Outcome: Progressing  Goal: Home and Community Transition Plan Established  Outcome: Progressing     Problem: Wound  Goal: Optimal Coping  Outcome: Progressing  Goal: Optimal Functional Ability  Outcome: Progressing  Goal: Absence of Infection Signs and Symptoms  Outcome: Progressing  Goal: Improved Oral Intake  Outcome: Progressing  Goal: Optimal Pain Control and Function  Outcome: Progressing  Goal: Skin Health and Integrity  Outcome: Progressing  Goal: Optimal Wound Healing  Outcome: Progressing     Problem: Fall Injury Risk  Goal: Absence of Fall and Fall-Related Injury  Outcome: Progressing     Problem: Skin Injury Risk Increased  Goal: Skin Health and Integrity  Outcome: Progressing     Problem: Pain Acute  Goal: Optimal Pain Control and Function  Outcome: Progressing

## 2024-05-23 NOTE — PLAN OF CARE
Discharging today as planned.    Alerted [facility] (delivering bilat PRW, w/c, BSC, and shower chair no back to room by 1030) and [facility] (will provide HH PT/OT/nursing) via [employer].  Will send AVS later/once completed.    Family training has been completed.    DMV form for handicapped tag was provided.    Patient requested letter to excuse him from school for this and the upcoming semester(s).  Will provide to him.
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
## 2024-05-23 NOTE — PLAN OF CARE
Problem: Acute Kidney Injury/Impairment  Goal: Fluid and Electrolyte Balance  Outcome: Adequate for Care Transition  Goal: Improved Oral Intake  Outcome: Adequate for Care Transition  Goal: Effective Renal Function  Outcome: Adequate for Care Transition     Problem: Rehabilitation (IRF) Plan of Care  Goal: Plan of Care Review  Outcome: Adequate for Care Transition  Goal: Patient-Specific Goal (Individualized)  Outcome: Adequate for Care Transition  Goal: Absence of New-Onset Illness or Injury  Outcome: Adequate for Care Transition  Goal: Optimal Comfort and Wellbeing  Outcome: Adequate for Care Transition  Goal: Home and Community Transition Plan Established  Outcome: Adequate for Care Transition     Problem: Infection  Goal: Absence of Infection Signs and Symptoms  Outcome: Adequate for Care Transition     Problem: Wound  Goal: Optimal Coping  Outcome: Adequate for Care Transition  Goal: Optimal Functional Ability  Outcome: Adequate for Care Transition  Goal: Absence of Infection Signs and Symptoms  Outcome: Adequate for Care Transition  Goal: Improved Oral Intake  Outcome: Adequate for Care Transition  Goal: Optimal Pain Control and Function  Outcome: Adequate for Care Transition  Goal: Skin Health and Integrity  Outcome: Adequate for Care Transition  Goal: Optimal Wound Healing  Outcome: Adequate for Care Transition     Problem: Fall Injury Risk  Goal: Absence of Fall and Fall-Related Injury  Outcome: Adequate for Care Transition     Problem: Skin Injury Risk Increased  Goal: Skin Health and Integrity  Outcome: Adequate for Care Transition     Problem: Pain Acute  Goal: Optimal Pain Control and Function  Outcome: Adequate for Care Transition
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
## 2024-05-29 ENCOUNTER — OFFICE VISIT (OUTPATIENT)
Dept: ORTHOPEDICS | Facility: CLINIC | Age: 24
End: 2024-05-29
Payer: MEDICAID

## 2024-05-29 VITALS — HEART RATE: 95 BPM | SYSTOLIC BLOOD PRESSURE: 124 MMHG | DIASTOLIC BLOOD PRESSURE: 80 MMHG

## 2024-05-29 DIAGNOSIS — Z98.890 S/P MULTIPLE SYSTEM TRAUMA SURGERY: ICD-10-CM

## 2024-05-29 PROCEDURE — 3074F SYST BP LT 130 MM HG: CPT | Mod: CPTII,,, | Performed by: REHABILITATION UNIT

## 2024-05-29 PROCEDURE — 3079F DIAST BP 80-89 MM HG: CPT | Mod: CPTII,,, | Performed by: REHABILITATION UNIT

## 2024-05-29 PROCEDURE — 99024 POSTOP FOLLOW-UP VISIT: CPT | Mod: ,,, | Performed by: REHABILITATION UNIT
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
## 2024-05-31 DIAGNOSIS — Z98.890 S/P MULTIPLE SYSTEM TRAUMA SURGERY: Primary | ICD-10-CM

## 2024-05-31 RX ORDER — METHOCARBAMOL 1000 MG/1
1000 TABLET, COATED ORAL EVERY 6 HOURS
Qty: 90 TABLET | Refills: 0 | Status: SHIPPED | OUTPATIENT
Start: 2024-05-31 | End: 2024-06-10 | Stop reason: SDUPTHER

## 2024-05-31 RX ORDER — OXYCODONE HYDROCHLORIDE 10 MG/1
10 TABLET ORAL EVERY 6 HOURS PRN
Qty: 12 TABLET | Refills: 0 | Status: SHIPPED | OUTPATIENT
Start: 2024-05-31 | End: 2024-06-05 | Stop reason: SDUPTHER

## 2024-05-31 RX ORDER — GABAPENTIN 400 MG/1
400 CAPSULE ORAL
Qty: 90 CAPSULE | Refills: 11 | Status: SHIPPED | OUTPATIENT
Start: 2024-05-31 | End: 2025-05-31

## 2024-05-31 NOTE — TELEPHONE ENCOUNTER
Patient mother was told that pain prescription refills needed to come from PCP when they were dischsrged from hospital. Patient mother did not think to ask for refills at visit on Wednesday. Patient needing medication refill.

## 2024-06-03 ENCOUNTER — TELEPHONE (OUTPATIENT)
Dept: FAMILY MEDICINE | Facility: CLINIC | Age: 24
End: 2024-06-03
Payer: MEDICAID

## 2024-06-03 NOTE — TELEPHONE ENCOUNTER
----- Message from Angle Olivia LPN sent at 6/3/2024  4:04 PM CDT -----    ----- Message -----  From: Dudley Garvin  Sent: 6/3/2024   4:03 PM CDT  To: Pia PEREIRA Staff    .Type:  Needs Medical Advice    Who Called: Kavitha pt mother   Symptoms (please be specific):    How long has patient had these symptoms:    Pharmacy name and phone #:    Would the patient rather a call back or a response via [facility]?   Best Call Back Number: [phone]  Additional Information: She was calling about a referral for her son, please give her a call back.

## 2024-06-03 NOTE — TELEPHONE ENCOUNTER
----- Message from Angle Olivia LPN sent at 6/3/2024  9:16 AM CDT -----    ----- Message -----  From: Dudley Garvin  Sent: 6/3/2024   9:13 AM CDT  To: Pia PEREIRA Staff    .Type:  Needs Medical Advice    Who Called: Kavitha Patient mother   Symptoms (please be specific):    How long has patient had these symptoms:    Pharmacy name and phone #:    Would the patient rather a call back or a response via [facility]?   Best Call Back Number: [phone]  Additional Information: The mother called was requesting a call back she was checking on a referral that was sent to the doctor's office for patient to become a new patient with Dr. Palacio.

## 2024-06-05 DIAGNOSIS — Z98.890 S/P MULTIPLE SYSTEM TRAUMA SURGERY: ICD-10-CM

## 2024-06-05 RX ORDER — OXYCODONE HYDROCHLORIDE 10 MG/1
10 TABLET ORAL EVERY 6 HOURS PRN
Qty: 25 TABLET | Refills: 0 | Status: SHIPPED | OUTPATIENT
Start: 2024-06-05 | End: 2024-06-10 | Stop reason: SDUPTHER

## 2024-06-05 NOTE — TELEPHONE ENCOUNTER
Patient needs refill on pain medication. Only filled for 3 days last time per Terri POTTER NP.  Please send a full weeks supply.

## 2024-06-08 LAB — VIEW PATHOLOGY REPORT (RELIAPATH): NORMAL

## 2024-06-10 ENCOUNTER — HOSPITAL ENCOUNTER (OUTPATIENT)
Dept: RADIOLOGY | Facility: CLINIC | Age: 24
Discharge: HOME | End: 2024-06-10
Attending: REHABILITATION UNIT
Payer: MEDICAID

## 2024-06-10 ENCOUNTER — OFFICE VISIT (OUTPATIENT)
Dept: ORTHOPEDICS | Facility: CLINIC | Age: 24
End: 2024-06-10
Payer: MEDICAID

## 2024-06-10 VITALS
HEIGHT: 63 IN | SYSTOLIC BLOOD PRESSURE: 114 MMHG | DIASTOLIC BLOOD PRESSURE: 70 MMHG | WEIGHT: 163.38 LBS | HEART RATE: 86 BPM | BODY MASS INDEX: 28.95 KG/M2

## 2024-06-10 DIAGNOSIS — Z98.890 S/P MULTIPLE SYSTEM TRAUMA SURGERY: ICD-10-CM

## 2024-06-10 DIAGNOSIS — Z98.890 S/P MULTIPLE SYSTEM TRAUMA SURGERY: Primary | ICD-10-CM

## 2024-06-10 PROCEDURE — 3078F DIAST BP <80 MM HG: CPT | Mod: CPTII,,, | Performed by: REHABILITATION UNIT

## 2024-06-10 PROCEDURE — 73590 X-RAY EXAM OF LOWER LEG: CPT | Mod: RT,,, | Performed by: REHABILITATION UNIT

## 2024-06-10 PROCEDURE — 73630 X-RAY EXAM OF FOOT: CPT | Mod: RT,,, | Performed by: REHABILITATION UNIT

## 2024-06-10 PROCEDURE — 73130 X-RAY EXAM OF HAND: CPT | Mod: LT,,, | Performed by: REHABILITATION UNIT

## 2024-06-10 PROCEDURE — 72190 X-RAY EXAM OF PELVIS: CPT | Mod: ,,, | Performed by: REHABILITATION UNIT

## 2024-06-10 PROCEDURE — 3074F SYST BP LT 130 MM HG: CPT | Mod: CPTII,,, | Performed by: REHABILITATION UNIT

## 2024-06-10 PROCEDURE — 73110 X-RAY EXAM OF WRIST: CPT | Mod: LT,,, | Performed by: REHABILITATION UNIT

## 2024-06-10 PROCEDURE — 99024 POSTOP FOLLOW-UP VISIT: CPT | Mod: ,,, | Performed by: REHABILITATION UNIT

## 2024-06-10 PROCEDURE — 73552 X-RAY EXAM OF FEMUR 2/>: CPT | Mod: RT,,, | Performed by: REHABILITATION UNIT

## 2024-06-10 PROCEDURE — 73110 X-RAY EXAM OF WRIST: CPT | Mod: RT,,, | Performed by: REHABILITATION UNIT

## 2024-06-10 RX ORDER — OXYCODONE HYDROCHLORIDE 10 MG/1
10 TABLET ORAL EVERY 6 HOURS PRN
Qty: 25 TABLET | Refills: 0 | Status: SHIPPED | OUTPATIENT
Start: 2024-06-10

## 2024-06-10 RX ORDER — METHOCARBAMOL 1000 MG/1
1000 TABLET, COATED ORAL EVERY 6 HOURS
Qty: 90 TABLET | Refills: 0 | Status: SHIPPED | OUTPATIENT
Start: 2024-06-10 | End: 2024-06-20

## 2024-06-10 NOTE — PROGRESS NOTES
Subjective:      Patient ID: Venkata Douglas is a 24 y.o. male.    Chief Complaint: Follow-up (4 week f/u intermedullary CLAUDETTE femur rt, ORIF fx radius distal- States has some swelling in knee and in foot. Is requesting refill on pain medications and gabapentin.  Is using braces and ambulating in wheelchair. )    Right open comminuted distal fibula fracture  -I&D and ORIF 5/8- splint  Right 3-5 MT fxs  -CRPP 5/8- splint  Right femoral shaft fracture  -IMN 5/9  Right distal radius fracture  -ORIF 5/9- splint  Right PCL avulsion fracture  -Non-op mgnt  Right nondisplaced vertical patella fracture  -Non-op mgnt  Nondisplaced Left thumb metacarpal fracture  -Non-op mgnt; splint  Nondisplaced right superior and inferior pubic rami   -Non-op mgnt  Nondisplaced right acetabulum fracture  -Non-op mgnt     Venkata Douglas returns to the clinic today for post-operative examination. Patient is s/p the above procedure. Pain is fairly controlled. He needs medication refills. Doing reasonably well postoperatively.  Denies any wound issues.  No fevers, chills, or night sweats.  Patient has been compliant with brace/boot and WB precautions.  Patient in wheelchair today.  No sensory or motor changes reported.  Patient working some on rehab. No other complaints at this time.       Comprehensive review of systems completed and negative except as per HPI.  Objective:     Vitals:    06/10/24 1300   BP: 114/70   Pulse: 86       Gen: No acute distress    RLE  Incisions healing well without signs of infection or dehiscence. Mild/moderate swelling normal for postoperative timeframe.    Pins securely in toes   Range of motion reduced to knee and ankle   Well-perfused lower extremity with capillary refill less than 2 seconds  Sensation intact to light touch to tibial nerve, superficial and deep peroneal distributions.  3 out of 5 EHL/FHL   Calf soft and easily compressible without clinical sign of DVT. No palpable popliteal lymphadenopathy.  No  pain out of proportion to expectation. No excessive pain with passive stretch of muscles in any compartment. Temperature and color of extremity appropriate. Brisk capillary refill of digits. Compartments compressible without evidence of excessive firmness.    RUE  Incision healing well with no external signs of infection   Motion reduced to wrist    SILT m/r/u  3/5 ain/pin/uln  Palpable radial pulse    LUE   Skin intact  + minimal ttp thumb  Motion reduced   SILT m/r/u  3/5 ain/pin/uln  Palpable radial pulse     Imaging:    3 v L wrist show thumb MC fx is stable with interval callus    3 v R wrist show postop hardware in appropriate position. No acute complications.    2v R femur show IMN and hardware in appropriate position. No acute complications.    3v L hand show thumb MC fx is stable with interval callus    2 v R tibia show stable hardware distal fibula in appropriate position. Stable PCL avulsion    3 v R foot show stable pins traversing fx with some interval healing     3 v pelvis show R hip joint concentrically reduced with no adverse changes        Assessment:       Encounter Diagnosis   Name Primary?    S/P multiple system trauma surgery Yes         Plan:       Venkata was seen today for follow-up.    Diagnoses and all orders for this visit:    S/P multiple system trauma surgery  -     X-Ray Wrist Complete Left; Future  -     X-Ray Wrist Complete Right; Future  -     X-Ray Femur 2 View Right; Future  -     Cancel: X-Ray Ankle Complete Right; Future  -     X-Ray Foot Complete Right; Future  -     X-Ray Pelvis Complete min 3 views; Future  -     X-Ray Hand 3 view Left; Future  -     Cancel: X-Ray Wrist Complete Right; Future  -     Ambulatory referral/consult to Physical/Occupational Therapy; Future  -     methocarbamoL 1,000 mg Tab; Take 1,000 mg by mouth every 6 (six) hours. for 10 days  -     oxyCODONE (ROXICODONE) 10 mg Tab immediate release tablet; Take 1 tablet (10 mg total) by mouth every 6 (six)  hours as needed for Pain. (Patient not taking: Reported on 7/8/2024)        Status post the above-stated procedure and doing reasonably well.   Pins to toes pulled. He tolerated well.   Pain medications as needed. Ice encouraged. Risk of medications were discussed. Oxycodone and robaxin refilled.   Continue anticoagulation.   Continue PT - Yomi. Compliance with protocol and home exercises encouraged.   NWB RLE, KEVINE, MARIO  All of the patient's questions and concerns were addressed during the visit. Patient will call or contact our office with any new issues or concerns.    Follow-up: Venkata Douglas to follow up in 4 weeks with XR. If there are any questions prior to this, the patient was instructed to contact the office.

## 2024-07-08 ENCOUNTER — HOSPITAL ENCOUNTER (OUTPATIENT)
Dept: RADIOLOGY | Facility: CLINIC | Age: 24
Discharge: HOME | End: 2024-07-08
Attending: REHABILITATION UNIT
Payer: MEDICAID

## 2024-07-08 ENCOUNTER — OFFICE VISIT (OUTPATIENT)
Dept: ORTHOPEDICS | Facility: CLINIC | Age: 24
End: 2024-07-08
Payer: MEDICAID

## 2024-07-08 VITALS
WEIGHT: 163.38 LBS | DIASTOLIC BLOOD PRESSURE: 83 MMHG | HEIGHT: 63 IN | BODY MASS INDEX: 28.95 KG/M2 | SYSTOLIC BLOOD PRESSURE: 134 MMHG | HEART RATE: 95 BPM

## 2024-07-08 DIAGNOSIS — Z98.890 S/P MULTIPLE SYSTEM TRAUMA SURGERY: ICD-10-CM

## 2024-07-08 DIAGNOSIS — Z98.890 S/P MULTIPLE SYSTEM TRAUMA SURGERY: Primary | ICD-10-CM

## 2024-07-08 PROCEDURE — 3075F SYST BP GE 130 - 139MM HG: CPT | Mod: CPTII,,, | Performed by: REHABILITATION UNIT

## 2024-07-08 PROCEDURE — 3079F DIAST BP 80-89 MM HG: CPT | Mod: CPTII,,, | Performed by: REHABILITATION UNIT

## 2024-07-08 PROCEDURE — 99024 POSTOP FOLLOW-UP VISIT: CPT | Mod: ,,, | Performed by: REHABILITATION UNIT

## 2024-07-08 RX ORDER — METHOCARBAMOL 500 MG/1
500 TABLET, FILM COATED ORAL 3 TIMES DAILY
COMMUNITY
Start: 2024-06-03

## 2024-07-08 NOTE — PROGRESS NOTES
Subjective:      Patient ID: Venkata Douglas is a 24 y.o. male.    Chief Complaint: Follow-up (8wk sp RIGHT FEMUR INTRAMEDULLARY CLAUDETTE, ORIF FRACTURE right RADIUS, DISTAL 5.9.24 gl 8.7.24. doing well overall. States some pain in hip area that is global and radiates along hipline. Made worse with ambulation and certain movements. Has been working with PT on and off and also HOME HEALTH. Wearing boot and has been nonweightbearing and using wheelchair to ambulate. XR today.)    Right open comminuted distal fibula fracture  -I&D and ORIF 5/8- splint  Right 3-5 MT fxs  -CRPP 5/8- splint  Right femoral shaft fracture  -IMN 5/9  Right distal radius fracture  -ORIF 5/9- splint  Right PCL avulsion fracture  -Non-op mgnt  Right nondisplaced vertical patella fracture  -Non-op mgnt  Nondisplaced Left thumb metacarpal fracture  -Non-op mgnt; splint  Nondisplaced right superior and inferior pubic rami   -Non-op mgnt  Nondisplaced right acetabulum fracture  -Non-op mgnt     Venkata Douglas returns to the clinic today for post-operative examination. Patient is s/p the above procedure. Pain is controlled. Doing reasonably well postoperatively.  Denies any wound issues.  No fevers, chills, or night sweats.  Patient has been compliant with brace/boot and WB precautions.  Patient in wheelchair today.  No sensory or motor changes reported.  Patient working some on rehab. Unfortunately, he has been out of PT recently. No other complaints at this time.       Comprehensive review of systems completed and negative except as per HPI.  Objective:     Vitals:    07/08/24 1257   BP: 134/83   Pulse: 95       Gen: No acute distress    RLE  Incisions healing well without signs of infection or dehiscence. Mild/moderate swelling normal for postoperative timeframe.    Range of motion reduced to knee and more so to ankle   Well-perfused lower extremity with capillary refill less than 2 seconds  Sensation intact to light touch to tibial nerve, superficial  and deep peroneal distributions.  3 out of 5 EHL/FHL   Calf soft and easily compressible without clinical sign of DVT. No palpable popliteal lymphadenopathy.  No pain out of proportion to expectation. No excessive pain with passive stretch of muscles in any compartment. Temperature and color of extremity appropriate. Brisk capillary refill of digits. Compartments compressible without evidence of excessive firmness.    RUE  Incision healing well with no external signs of infection   Motion reduced to wrist    SILT m/r/u  3/5 ain/pin/uln  Palpable radial pulse    LUE   Skin intact  no ttp thumb  Motion improving  SILT m/r/u  3/5 ain/pin/uln  Palpable radial pulse     Imaging:    3 v L wrist show thumb MC fx is stable with interval callus    3 v R wrist show postop hardware in appropriate position. No acute complications.    2v R femur show IMN and hardware in appropriate position. No acute complications. Some callus    3v L hand show thumb MC fx is stable with callus    3 v R foot show stable appearance of fxs with some interval healing. Osteopenia. Stable ankle hardware.     3 v pelvis show R hip joint concentrically reduced with no adverse changes        Assessment:       Encounter Diagnosis   Name Primary?    S/P multiple system trauma surgery Yes         Plan:       Venkata was seen today for follow-up.    Diagnoses and all orders for this visit:    S/P multiple system trauma surgery  -     X-Ray Femur 2 View Right; Future  -     X-Ray Foot Complete Right; Future  -     X-Ray Hand 3 view Left; Future  -     X-Ray Wrist Complete Right; Future  -     X-Ray Wrist Complete Left; Future  -     X-Ray Pelvis Complete min 3 views; Future        Status post the above-stated procedure and doing reasonably well.   Pain medications as needed. Ice encouraged. Risk of medications were discussed.   Continue anticoagulation.   Continue PT - Yomi. Compliance with protocol and home exercises encouraged.   NWB RLE, RUE, RAINAE  Wean  out of wrist brace.  Plan for transitioning out of boot and his next visit.  He does have some stiffness to his wrist and ankle so I have encouraged him to work on his motion  All of the patient's questions and concerns were addressed during the visit. Patient will call or contact our office with any new issues or concerns.    Follow-up: Venkata Douglas to follow up in 2 weeks with XR. If there are any questions prior to this, the patient was instructed to contact the office.

## 2024-07-09 ENCOUNTER — DOCUMENT SCAN (OUTPATIENT)
Dept: HOME HEALTH SERVICES | Facility: HOSPITAL | Age: 24
End: 2024-07-09
Payer: MEDICAID

## 2024-07-29 ENCOUNTER — HOSPITAL ENCOUNTER (OUTPATIENT)
Dept: RADIOLOGY | Facility: CLINIC | Age: 24
Discharge: HOME | End: 2024-07-29
Attending: REHABILITATION UNIT
Payer: MEDICAID

## 2024-07-29 ENCOUNTER — OFFICE VISIT (OUTPATIENT)
Dept: ORTHOPEDICS | Facility: CLINIC | Age: 24
End: 2024-07-29
Payer: MEDICAID

## 2024-07-29 VITALS
DIASTOLIC BLOOD PRESSURE: 89 MMHG | HEIGHT: 63 IN | WEIGHT: 163.38 LBS | BODY MASS INDEX: 28.95 KG/M2 | HEART RATE: 91 BPM | SYSTOLIC BLOOD PRESSURE: 124 MMHG

## 2024-07-29 DIAGNOSIS — Z98.890 S/P MULTIPLE SYSTEM TRAUMA SURGERY: ICD-10-CM

## 2024-07-29 DIAGNOSIS — Z98.890 S/P MULTIPLE SYSTEM TRAUMA SURGERY: Primary | ICD-10-CM

## 2024-07-29 PROCEDURE — 73130 X-RAY EXAM OF HAND: CPT | Mod: LT,,, | Performed by: REHABILITATION UNIT

## 2024-07-29 PROCEDURE — 72190 X-RAY EXAM OF PELVIS: CPT | Mod: ,,, | Performed by: REHABILITATION UNIT

## 2024-07-29 PROCEDURE — 73110 X-RAY EXAM OF WRIST: CPT | Mod: RT,,, | Performed by: REHABILITATION UNIT

## 2024-07-29 PROCEDURE — 73630 X-RAY EXAM OF FOOT: CPT | Mod: RT,,, | Performed by: REHABILITATION UNIT

## 2024-07-29 PROCEDURE — 73552 X-RAY EXAM OF FEMUR 2/>: CPT | Mod: RT,,, | Performed by: REHABILITATION UNIT

## 2024-07-29 NOTE — PROGRESS NOTES
Subjective:      Patient ID: Venkata Douglas is a 24 y.o. male.    Chief Complaint: Follow-up (12 wks Multiple trauma sx 5/9/24- Stated everything is going good. Denies any stiffness, swelling or soreness. )    Right open comminuted distal fibula fracture  -I&D and ORIF 5/8- splint  Right 3-5 MT fxs  -CRPP 5/8- splint  Right femoral shaft fracture  -IMN 5/9  Right distal radius fracture  -ORIF 5/9- splint  Right PCL avulsion fracture  -Non-op mgnt  Right nondisplaced vertical patella fracture  -Non-op mgnt  Nondisplaced Left thumb metacarpal fracture  -Non-op mgnt; splint  Nondisplaced right superior and inferior pubic rami   -Non-op mgnt  Nondisplaced right acetabulum fracture  -Non-op mgnt     Venkata Douglas returns to the clinic today for post-operative examination. Patient is s/p the above procedure. Pain is controlled. Doing reasonably well postoperatively.  Denies any wound issues.  No fevers, chills, or night sweats.  Patient has been compliant with brace/boot and WB precautions.  Patient ambulating with crutches today.  No sensory or motor changes reported.  Patient working some on rehab.  He is attending 1 therapy session for physical therapy and 1 for occupational therapy weekly. No other complaints at this time.       Comprehensive review of systems completed and negative except as per HPI.  Objective:     Vitals:    07/29/24 1253   BP: 124/89   Pulse: 91       Gen: No acute distress    RLE  Incisions well-healed without signs of infection or dehiscence. Mild swelling normal for postoperative timeframe.    Knee motion is 0-130 degrees, ankle motion is 10° of dorsiflexion to around 20° of plantar flexion.    No tenderness to the ankle  He does have some laxity to posterior drawer otherwise he was ligamentously sound  Well-perfused lower extremity with capillary refill less than 2 seconds  Sensation intact to light touch to tibial nerve, superficial and deep peroneal distributions.  4 out of 5 EHL/FHL   Calf  soft and easily compressible without clinical sign of DVT. No palpable popliteal lymphadenopathy.  No pain out of proportion to expectation. No excessive pain with passive stretch of muscles in any compartment. Temperature and color of extremity appropriate. Brisk capillary refill of digits. Compartments compressible without evidence of excessive firmness.    RUE  Incision well healed with no external signs of infection   He lacks a few degrees of extension but has symmetric flexion, pronation, and supination  SILT m/r/u  4/5 ain/pin/uln  Palpable radial pulse    LUE   Skin intact  no ttp thumb  Full motion  SILT m/r/u  4/5 ain/pin/uln  Palpable radial pulse     Imaging:    3 v L hand show thumb MC fx is stable with interval callus    3 v R wrist show postop hardware in appropriate position. No acute complications.    2v R femur show IMN and hardware in appropriate position. No acute complications. Some callus    3 v R foot show stable appearance of fxs with some interval healing. Osteopenia. Stable ankle hardware.     3 v pelvis show R hip joint concentrically reduced with no adverse changes        Assessment:       Encounter Diagnosis   Name Primary?    S/P multiple system trauma surgery Yes         Plan:       Venkata was seen today for follow-up.    Diagnoses and all orders for this visit:    S/P multiple system trauma surgery  -     X-Ray Femur 2 View Right; Future  -     X-Ray Foot Complete Right; Future  -     X-Ray Hand 3 view Left; Future  -     Cancel: X-Ray Wrist Complete Left; Future  -     X-Ray Wrist Complete Right; Future  -     X-Ray Pelvis Complete min 3 views; Future  -     HME - OTHER      Status post the above-stated procedure and doing reasonably well.     Pain medications as needed. Ice encouraged. Risk of medications were discussed.   Continue PT - [facility]. Compliance with protocol and home exercises encouraged.   He may progress his weight-bearing to full to all extremities  Transition out of  boot and into removable lace-up ankle brace  Continue motion and strengthening  Patient has quadriceps atrophy with persistent instability on knee exam to posterior stress from his PCL avulsion injury.  Custom PCL brace ordered  All of the patient's questions and concerns were addressed during the visit. Patient will call or contact our office with any new issues or concerns.    Follow-up: Venkata Douglas to follow up in 2-3 months with XR of his right femur, right ankle, right foot. If there are any questions prior to this, the patient was instructed to contact the office.

## 2024-08-19 PROBLEM — N17.9 AKI (ACUTE KIDNEY INJURY): Status: RESOLVED | Noted: 2024-05-08 | Resolved: 2024-08-19

## 2024-10-08 ENCOUNTER — HOSPITAL ENCOUNTER (OUTPATIENT)
Dept: RADIOLOGY | Facility: CLINIC | Age: 24
Discharge: HOME | End: 2024-10-08
Attending: REHABILITATION UNIT
Payer: MEDICAID

## 2024-10-08 ENCOUNTER — OFFICE VISIT (OUTPATIENT)
Dept: ORTHOPEDICS | Facility: CLINIC | Age: 24
End: 2024-10-08
Payer: MEDICAID

## 2024-10-08 VITALS — WEIGHT: 163.38 LBS | BODY MASS INDEX: 28.95 KG/M2 | HEIGHT: 63 IN

## 2024-10-08 DIAGNOSIS — Z98.890 S/P MULTIPLE SYSTEM TRAUMA SURGERY: ICD-10-CM

## 2024-10-08 DIAGNOSIS — Z98.890 S/P MULTIPLE SYSTEM TRAUMA SURGERY: Primary | ICD-10-CM

## 2024-10-08 PROCEDURE — 73610 X-RAY EXAM OF ANKLE: CPT | Mod: RT,,, | Performed by: REHABILITATION UNIT

## 2024-10-08 PROCEDURE — 73552 X-RAY EXAM OF FEMUR 2/>: CPT | Mod: RT,,, | Performed by: REHABILITATION UNIT

## 2024-10-08 PROCEDURE — 3008F BODY MASS INDEX DOCD: CPT | Mod: CPTII,,, | Performed by: REHABILITATION UNIT

## 2024-10-08 PROCEDURE — 73630 X-RAY EXAM OF FOOT: CPT | Mod: RT,,, | Performed by: REHABILITATION UNIT

## 2024-10-08 PROCEDURE — 1159F MED LIST DOCD IN RCRD: CPT | Mod: CPTII,,, | Performed by: REHABILITATION UNIT

## 2024-10-08 PROCEDURE — 99213 OFFICE O/P EST LOW 20 MIN: CPT | Mod: ,,, | Performed by: REHABILITATION UNIT

## 2024-10-08 NOTE — PROGRESS NOTES
Subjective:      Patient ID: Venkata Douglas is a 24 y.o. male.    Chief Complaint: Follow-up (5 months sx 5/9/24- Intramedullary konstantin, rt femur fx, rt distal radius- Stated everything has been good. Denies any numbness, swelling or pain. )    Right open comminuted distal fibula fracture  -I&D and ORIF 5/8- splint  Right 3-5 MT fxs  -CRPP 5/8- splint  Right femoral shaft fracture  -IMN 5/9  Right distal radius fracture  -ORIF 5/9- splint  Right PCL avulsion fracture  -Non-op mgnt  Right nondisplaced vertical patella fracture  -Non-op mgnt  Nondisplaced Left thumb metacarpal fracture  -Non-op mgnt; splint  Nondisplaced right superior and inferior pubic rami   -Non-op mgnt  Nondisplaced right acetabulum fracture  -Non-op mgnt     Venkata Douglas returns to the clinic today for post-operative examination. Patient is s/p the above procedure.  Denies any pain.  He has been working with physical therapy.  He is ambulating in normal shoe wear with no crutches today.  He says that he occasionally uses a cane.  He has been wearing his knee brace some with higher activities.  No sensory or motor changes reported.       Comprehensive review of systems completed and negative except as per HPI.  Objective:     There were no vitals filed for this visit.      Gen: No acute distress    RLE  Incisions well-healed without signs of infection or dehiscence.  No significant swelling    Knee motion is 0-130 degrees, ankle motion is 20° of dorsiflexion to around 25° of plantar flexion.    No tenderness to the ankle  He does have some laxity to posterior drawer otherwise he was ligamentously sound  Well-perfused lower extremity with capillary refill less than 2 seconds  Sensation intact to light touch to tibial nerve, superficial and deep peroneal distributions.  4 out of 5 EHL/FHL   Calf soft and easily compressible without clinical sign of DVT. No palpable popliteal lymphadenopathy.  No pain out of proportion to expectation. No excessive  pain with passive stretch of muscles in any compartment. Temperature and color of extremity appropriate. Brisk capillary refill of digits. Compartments compressible without evidence of excessive firmness.    RUE  Incision well healed with no external signs of infection   Full and symmetric extension, flexion, pronation, and supination  SILT m/r/u  5/5 ain/pin/uln  Palpable radial pulse    LUE   Skin intact  no ttp thumb  Full motion  SILT m/r/u  5/5 ain/pin/uln  Palpable radial pulse     Imaginv R femur show IMN and hardware in appropriate position. No acute complications. Some callus but still lacks good bridging callus    3 v R foot show stable appearance of fxs with some interval healing. Osteopenia. Stable ankle hardware.     Three-view right ankle show stable appearance of fractures with some interval healing.  Disuse osteopenia.      Assessment:       Encounter Diagnosis   Name Primary?    S/P multiple system trauma surgery Yes         Plan:       Venkata was seen today for follow-up.    Diagnoses and all orders for this visit:    S/P multiple system trauma surgery  -     X-Ray Femur 2 View Right; Future  -     X-Ray Ankle Complete Right; Future  -     X-Ray Foot Complete Right; Future      Status post the above-stated procedure and doing reasonably well.     Pain medications as needed. Ice encouraged. Risk of medications were discussed.   Continue exercises for prolonged and sustained health  Weightbearing as tolerated all extremities  Knee brace for higher level activities  Continue motion and strengthening  All of the patient's questions and concerns were addressed during the visit. Patient will call or contact our office with any new issues or concerns.    Follow-up: Venkata Roqueen to follow up in 2-3 months with XR of his right femur, right ankle. If there are any questions prior to this, the patient was instructed to contact the office.

## 2024-11-12 ENCOUNTER — TELEPHONE (OUTPATIENT)
Dept: ORTHOPEDICS | Facility: CLINIC | Age: 24
End: 2024-11-12
Payer: MEDICAID

## 2024-11-12 NOTE — TELEPHONE ENCOUNTER
Patient mother called, lvm asking for patient next appt date.    Spoke with patient,told him about VM mother left, gave him next appt date and time to real message to his mom. Patient voiced understanding.

## 2025-01-13 ENCOUNTER — HOSPITAL ENCOUNTER (OUTPATIENT)
Dept: RADIOLOGY | Facility: CLINIC | Age: 25
Discharge: HOME | End: 2025-01-13
Attending: REHABILITATION UNIT
Payer: MEDICAID

## 2025-01-13 ENCOUNTER — OFFICE VISIT (OUTPATIENT)
Dept: ORTHOPEDICS | Facility: CLINIC | Age: 25
End: 2025-01-13
Payer: MEDICAID

## 2025-01-13 VITALS
WEIGHT: 154 LBS | HEIGHT: 63 IN | HEART RATE: 76 BPM | DIASTOLIC BLOOD PRESSURE: 76 MMHG | SYSTOLIC BLOOD PRESSURE: 132 MMHG | BODY MASS INDEX: 27.29 KG/M2

## 2025-01-13 DIAGNOSIS — Z98.890 S/P MULTIPLE SYSTEM TRAUMA SURGERY: ICD-10-CM

## 2025-01-13 DIAGNOSIS — S72.351K CLOSED DISP COMMINUTED FRACTURE OF SHAFT OF RIGHT FEMUR WITH NONUNION: Primary | ICD-10-CM

## 2025-01-13 PROCEDURE — 1159F MED LIST DOCD IN RCRD: CPT | Mod: CPTII,,, | Performed by: REHABILITATION UNIT

## 2025-01-13 PROCEDURE — 73610 X-RAY EXAM OF ANKLE: CPT | Mod: RT,,, | Performed by: REHABILITATION UNIT

## 2025-01-13 PROCEDURE — 3078F DIAST BP <80 MM HG: CPT | Mod: CPTII,,, | Performed by: REHABILITATION UNIT

## 2025-01-13 PROCEDURE — 3008F BODY MASS INDEX DOCD: CPT | Mod: CPTII,,, | Performed by: REHABILITATION UNIT

## 2025-01-13 PROCEDURE — 99214 OFFICE O/P EST MOD 30 MIN: CPT | Mod: ,,, | Performed by: REHABILITATION UNIT

## 2025-01-13 PROCEDURE — 3075F SYST BP GE 130 - 139MM HG: CPT | Mod: CPTII,,, | Performed by: REHABILITATION UNIT

## 2025-01-13 PROCEDURE — 73552 X-RAY EXAM OF FEMUR 2/>: CPT | Mod: RT,,, | Performed by: REHABILITATION UNIT

## 2025-01-13 NOTE — PROGRESS NOTES
Subjective:      Patient ID: Venkata Douglas is a 24 y.o. male.    Chief Complaint: Follow-up of the Right Femur (F/u right femur ORIF Fx 5/9/24 patient states it feels good but has some pain. He can harmony run on it a little. He do have some questions. )    Right open comminuted distal fibula fracture  -I&D and ORIF 5/8- splint  Right 3-5 MT fxs  -CRPP 5/8- splint  Right femoral shaft fracture  -IMN 5/9  Right distal radius fracture  -ORIF 5/9- splint  Right PCL avulsion fracture  -Non-op mgnt  Right nondisplaced vertical patella fracture  -Non-op mgnt  Nondisplaced Left thumb metacarpal fracture  -Non-op mgnt; splint  Nondisplaced right superior and inferior pubic rami   -Non-op mgnt  Nondisplaced right acetabulum fracture  -Non-op mgnt     Venkata Douglas returns to the clinic today for post-operative examination. Patient is s/p the above procedure.  He is doing reasonably well.  He has increased his activities.  He has occasional pain maximally localized to thigh.  He denies any ankle.  No issues with his upper extremities.  Ambulating without device.  He does not have his brace with him. No sensory or motor changes reported.  Denies any fevers, chills, night sweats.     Comprehensive review of systems completed and negative except as per HPI.  Objective:     Vitals:    01/13/25 1357   BP: 132/76   Pulse: 76         Gen: No acute distress    RLE  Incisions well-healed without signs of infection or dehiscence.  No significant swelling    Knee motion is 0-130 degrees, ankle motion is full and painless  No tenderness to the ankle  He does have some laxity to posterior drawer otherwise he was ligamentously sound  Well-perfused lower extremity with capillary refill less than 2 seconds  Sensation intact to light touch to tibial nerve, superficial and deep peroneal distributions.  4 out of 5 EHL/FHL   Calf soft and easily compressible without clinical sign of DVT. No palpable popliteal lymphadenopathy.  No pain out of  proportion to expectation. No excessive pain with passive stretch of muscles in any compartment. Temperature and color of extremity appropriate. Brisk capillary refill of digits. Compartments compressible without evidence of excessive firmness.    RUE  Incision well healed with no external signs of infection   Full and symmetric extension, flexion, pronation, and supination  SILT m/r/u  5/5 ain/pin/uln  Palpable radial pulse    LUE   Skin intact  no ttp thumb  Full motion  SILT m/r/u  5/5 ain/pin/uln  Palpable radial pulse     Imaginv R femur show IMN and hardware in appropriate position. No acute complications. Hypertrophic callus with persistent fracture line    Three-view right ankle show stable appearance of fractures with some interval healing.  Disuse osteopenia.      Assessment:       Encounter Diagnosis   Name Primary?    S/P multiple system trauma surgery Yes         Plan:       Venkata was seen today for follow-up.    Diagnoses and all orders for this visit:    S/P multiple system trauma surgery  -     X-Ray Ankle Complete Right; Future  -     X-Ray Femur 2 View Right; Future      Status post the above-stated procedures    Radiographs without bridging callus.  He has some discomfort that persists here.  I have discussed with my trauma partner.  Nonunion lab workup ordered.  I am going to refer him for further evaluation and possible dynamization of the nail.  He will follow up with Dr. Young after his labs are completed.  Weightbearing as tolerated all extremities  Knee brace for higher level activities for PCL injury  Continue motion and strengthening  All of the patient's questions and concerns were addressed during the visit. Patient will call or contact our office with any new issues or concerns.

## 2025-01-24 ENCOUNTER — TELEPHONE (OUTPATIENT)
Dept: ORTHOPEDICS | Facility: CLINIC | Age: 25
End: 2025-01-24
Payer: MEDICAID

## 2025-01-24 NOTE — TELEPHONE ENCOUNTER
Pt mother called and asked to call her back with my email so she could send a return to work form.     I attempted to call pt mother back. Hector with my email and phone number. [phone].

## 2025-02-14 ENCOUNTER — LAB VISIT (OUTPATIENT)
Dept: LAB | Facility: HOSPITAL | Age: 25
End: 2025-02-14
Attending: REHABILITATION UNIT
Payer: MEDICAID

## 2025-02-14 ENCOUNTER — TELEPHONE (OUTPATIENT)
Dept: ORTHOPEDICS | Facility: CLINIC | Age: 25
End: 2025-02-14
Payer: MEDICAID

## 2025-02-14 DIAGNOSIS — S72.351K CLOSED DISP COMMINUTED FRACTURE OF SHAFT OF RIGHT FEMUR WITH NONUNION: ICD-10-CM

## 2025-02-14 LAB
25(OH)D3+25(OH)D2 SERPL-MCNC: 18 NG/ML (ref 30–80)
ALBUMIN SERPL-MCNC: 4.5 G/DL (ref 3.5–5)
ALBUMIN/GLOB SERPL: 1.3 RATIO (ref 1.1–2)
ALP SERPL-CCNC: 82 UNIT/L (ref 40–150)
ALT SERPL-CCNC: 19 UNIT/L (ref 0–55)
ANION GAP SERPL CALC-SCNC: 11 MEQ/L
AST SERPL-CCNC: 12 UNIT/L (ref 5–34)
BASOPHILS # BLD AUTO: 0.06 X10(3)/MCL
BASOPHILS NFR BLD AUTO: 0.7 %
BILIRUB SERPL-MCNC: 0.4 MG/DL
BUN SERPL-MCNC: 15.5 MG/DL (ref 8.9–20.6)
CALCIUM SERPL-MCNC: 9.5 MG/DL (ref 8.4–10.2)
CHLORIDE SERPL-SCNC: 105 MMOL/L (ref 98–107)
CO2 SERPL-SCNC: 27 MMOL/L (ref 22–29)
CREAT SERPL-MCNC: 0.84 MG/DL (ref 0.72–1.25)
CREAT/UREA NIT SERPL: 18
CRP SERPL HS-MCNC: 4.46 MG/L
EOSINOPHIL # BLD AUTO: 0.22 X10(3)/MCL (ref 0–0.9)
EOSINOPHIL NFR BLD AUTO: 2.6 %
ERYTHROCYTE [DISTWIDTH] IN BLOOD BY AUTOMATED COUNT: 11.9 % (ref 11.5–17)
ERYTHROCYTE [SEDIMENTATION RATE] IN BLOOD: 5 MM/HR (ref 0–15)
GFR SERPLBLD CREATININE-BSD FMLA CKD-EPI: >60 ML/MIN/1.73/M2
GLOBULIN SER-MCNC: 3.5 GM/DL (ref 2.4–3.5)
GLUCOSE SERPL-MCNC: 71 MG/DL (ref 74–100)
HCT VFR BLD AUTO: 43.1 % (ref 42–52)
HGB BLD-MCNC: 14.4 G/DL (ref 14–18)
IMM GRANULOCYTES # BLD AUTO: 0.03 X10(3)/MCL (ref 0–0.04)
IMM GRANULOCYTES NFR BLD AUTO: 0.4 %
LYMPHOCYTES # BLD AUTO: 1.93 X10(3)/MCL (ref 0.6–4.6)
LYMPHOCYTES NFR BLD AUTO: 22.7 %
MCH RBC QN AUTO: 30.6 PG (ref 27–31)
MCHC RBC AUTO-ENTMCNC: 33.4 G/DL (ref 33–36)
MCV RBC AUTO: 91.7 FL (ref 80–94)
MONOCYTES # BLD AUTO: 0.49 X10(3)/MCL (ref 0.1–1.3)
MONOCYTES NFR BLD AUTO: 5.8 %
NEUTROPHILS # BLD AUTO: 5.77 X10(3)/MCL (ref 2.1–9.2)
NEUTROPHILS NFR BLD AUTO: 67.8 %
NRBC BLD AUTO-RTO: 0 %
PLATELET # BLD AUTO: 351 X10(3)/MCL (ref 130–400)
PMV BLD AUTO: 9.4 FL (ref 7.4–10.4)
POTASSIUM SERPL-SCNC: 3.7 MMOL/L (ref 3.5–5.1)
PREALB SERPL-MCNC: 32.7 MG/DL (ref 18–45)
PROT SERPL-MCNC: 8 GM/DL (ref 6.4–8.3)
PTH-INTACT SERPL-MCNC: 55.1 PG/ML (ref 8.7–77)
RBC # BLD AUTO: 4.7 X10(6)/MCL (ref 4.7–6.1)
SODIUM SERPL-SCNC: 143 MMOL/L (ref 136–145)
TSH SERPL-ACNC: 0.83 UIU/ML (ref 0.35–4.94)
WBC # BLD AUTO: 8.5 X10(3)/MCL (ref 4.5–11.5)

## 2025-02-14 PROCEDURE — 84443 ASSAY THYROID STIM HORMONE: CPT

## 2025-02-14 PROCEDURE — 83970 ASSAY OF PARATHORMONE: CPT

## 2025-02-14 PROCEDURE — 84134 ASSAY OF PREALBUMIN: CPT

## 2025-02-14 PROCEDURE — 85652 RBC SED RATE AUTOMATED: CPT

## 2025-02-14 PROCEDURE — 80323 ALKALOIDS NOS: CPT

## 2025-02-14 PROCEDURE — 85025 COMPLETE CBC W/AUTO DIFF WBC: CPT

## 2025-02-14 PROCEDURE — 36415 COLL VENOUS BLD VENIPUNCTURE: CPT

## 2025-02-14 PROCEDURE — 86141 C-REACTIVE PROTEIN HS: CPT

## 2025-02-14 PROCEDURE — 82306 VITAMIN D 25 HYDROXY: CPT

## 2025-02-14 PROCEDURE — 80053 COMPREHEN METABOLIC PANEL: CPT

## 2025-02-17 ENCOUNTER — OFFICE VISIT (OUTPATIENT)
Dept: ORTHOPEDICS | Facility: CLINIC | Age: 25
End: 2025-02-17
Payer: MEDICAID

## 2025-02-17 VITALS
HEIGHT: 63 IN | HEART RATE: 74 BPM | BODY MASS INDEX: 26.95 KG/M2 | SYSTOLIC BLOOD PRESSURE: 127 MMHG | DIASTOLIC BLOOD PRESSURE: 84 MMHG | WEIGHT: 152.13 LBS

## 2025-02-17 DIAGNOSIS — S72.351K CLOSED DISP COMMINUTED FRACTURE OF SHAFT OF RIGHT FEMUR WITH NONUNION: Primary | ICD-10-CM

## 2025-02-17 DIAGNOSIS — S72.361K: ICD-10-CM

## 2025-02-17 LAB
COTININE SERPL-MCNC: 423 NG/ML
NICOTINE SERPL-MCNC: 32 NG/ML

## 2025-02-17 PROCEDURE — 99214 OFFICE O/P EST MOD 30 MIN: CPT | Mod: ,,, | Performed by: ORTHOPAEDIC SURGERY

## 2025-02-17 RX ORDER — MUPIROCIN 20 MG/G
OINTMENT TOPICAL
Status: CANCELLED | OUTPATIENT
Start: 2025-02-17

## 2025-02-17 RX ORDER — SODIUM CHLORIDE 0.9 % (FLUSH) 0.9 %
10 SYRINGE (ML) INJECTION
Status: CANCELLED | OUTPATIENT
Start: 2025-02-17

## 2025-02-17 RX ORDER — ERGOCALCIFEROL 1.25 MG/1
50000 CAPSULE ORAL
Qty: 12 CAPSULE | Refills: 0 | Status: SHIPPED | OUTPATIENT
Start: 2025-02-17

## 2025-02-17 NOTE — PROGRESS NOTES
"Subjective:       Patient ID: Venkata Douglas is a 24 y.o. male.  Chief Complaint   Patient presents with    Right Femur - Injury     9.5 month f/u from [facility] right femoral shaft fx with Dr. Reaves. Ambulates without assistive devices.        HPI:  History of Present Illness    HPI:  Mr. Douglas presents for evaluation of a non-healing fracture in his leg. He reports a mild discomfort when walking on the affected leg and has not been able to resume skateboarding due to the injury. He cancelled a previous appointment due to being in the hospital for an operation. His ankle surgery was approximately one year ago, with hardware still in place. He has a completely torn PCL in his knee, which has not yet been addressed surgically. His knee is making popping sounds, though it's unclear if this is related to the fracture or the PCL tear. He smokes tobacco and uses substances other than tobacco/nicotine. Mr. Douglas is currently unemployed.    PREVIOUS TREATMENTS:  Mr. Douglas underwent ankle surgery approximately one year ago, during which hardware was implanted.    IMAGING:  X-rays of the patient's femur fracture site reveal a gap and non-healing at the fracture site.    SOCIAL HISTORY:  Mr. Douglas currently smokes tobacco/nicotine.     WORK STATUS:  Mr. Douglas is currently unemployed.      ROS:  Musculoskeletal: +joint pain, denies joint stiffness          ROS:  Constitutional: Denies fever chills  Eyes: No change in vision  ENT: No ringing or current infections  CV: No chest pain  Resp: No labored breathing  MSK: Pain evident at site of injury located in HPI,   Integ: No signs of abrasions or lacerations  Neuro: No numbness or tingling  Lymphatic: No swelling outside the area of injury     Medications Ordered Prior to Encounter[1]       Objective:      /84   Pulse 74   Ht 5' 3" (1.6 m)   Wt 69 kg (152 lb 1.9 oz)   BMI 26.95 kg/m²   General the patient is alert and oriented x3 no acute distress nontoxic-appearing " appropriate affect.    Constitutional: Vital signs are examined and stable.  Resp: No signs of labored breathing      RLE: -Skin:= No signs of new abrasions or lacerations, no scars           -MSK: : Hip and Knee F/E, EHL/FHL, Gastroc/Tib anterior Strength 5/5           -Neuro:  Sensation intact to light touch L3-S1 dermatomes           -Lymphatic: No signs of lymphadenopathy           -CV: Capillary refill is less than 2 seconds. DP/PT pulses  2/4. Compartments soft and compressible.   Body mass index is 26.95 kg/m².  Ideal body weight: 56.9 kg (125 lb 7.1 oz)  Adjusted ideal body weight: 61.7 kg (136 lb 1.8 oz)  Hemoglobin A1C   Date Value Ref Range Status   06/08/2023 4.5 4.0 - 5.6 % Final     Comment:     ADA Screening Guidelines:  5.7-6.4%  Consistent with prediabetes  >or=6.5%  Consistent with diabetes    High levels of fetal hemoglobin interfere with the HbA1C  assay. Heterozygous hemoglobin variants (HbS, HgC, etc)do  not significantly interfere with this assay.   However, presence of multiple variants may affect accuracy.       Hgb   Date Value Ref Range Status   02/14/2025 14.4 14.0 - 18.0 g/dL Final   05/20/2024 9.4 (L) 14.0 - 18.0 g/dL Final     Hemoglobin   Date Value Ref Range Status   06/06/2023 13.9 (L) 14.0 - 18.0 g/dL Final     Vitamin D   Date Value Ref Range Status   02/14/2025 18 (L) 30 - 80 ng/mL Final     WBC   Date Value Ref Range Status   02/14/2025 8.50 4.50 - 11.50 x10(3)/mcL Final   05/20/2024 12.12 (H) 4.50 - 11.50 x10(3)/mcL Final   05/08/2024 30.36 x10(3)/mcL Final   06/06/2023 7.22 3.90 - 12.70 K/uL Final       Radiology:  Two views right femur previous x-rays from Dr. Saucedo's office showing a right femur nonunion with intact hardware        Assessment:         1. 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 Closed displaced segmental fracture of shaft of right femur with nonunion                 Plan:         No follow-ups on file.    Venkata was seen today for injury.    Diagnoses and all orders for this visit:    Closed disp comminuted fracture of shaft of right femur with nonunion  -     Case Request Operating Room: INSERTION, INTRAMEDULLARY KONSTANTIN, FEMUR    Closed displaced segmental fracture of shaft of right femur with nonunion    Other orders  -     Vital signs; Standing  -     Cleanse with Chlorhexidine (CHG); Standing  -     Diet NPO; Standing  -     sodium chloride 0.9% flush 10 mL  -     IP VTE LOW RISK PATIENT; Standing  -     Place STANTON hose; Standing  -     Place sequential compression device; Standing  -     Chlorohexidine Gluconate Bath; Standing  -     mupirocin 2 % ointment  -     Full code; Standing  -     Place in Outpatient; Standing  -     ceFAZolin (Ancef) 2 g in D5W 50 mL IVPB      Assessment & Plan    PLAN SUMMARY:  - X-rays of ankle hardware before leaving  - Scheduled surgery for Thursday or Friday: remove current konstantin, insert bigger konstantin, clean fracture site, insert antibiotic cement spacer  - Second surgery in 6 weeks to replace spacer with bone graft  - Possible removal of ankle hardware, to be assessed during surgery  - Stop using nicotine products  - Contact office to remind about ankle hardware removal  - Future PCL repair and knee arthroscopy by Dr. Reaves after current healing process    FOLLOW UP:  - Follow up before leaving to get x-rays of ankle hardware.  - Contact the office to remind about ankle hardware removal.    PROCEDURES:  - Mr. Douglas understands the risks and benefits and elects to proceed with removal of current konstantin, insertion of bigger konstantin, cleaning of fracture site, insertion of antibiotic cement spacer, followed by a second surgery in 6 weeks to replace spacer with bone graft.  - Scheduled surgery for Thursday or Friday. Potential for overnight stay post-surgery.  - Possibility of removing ankle hardware. Will assess during surgery.  - Potential  future PCL repair and knee arthroscopy to be performed by Dr. Reaves after current healing process.    PATIENT INSTRUCTIONS:  - Stop using nicotine products.          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  He had a segmental short segmental comminuted fracture.  Does use nicotine products.  He has biggest activity skateboarding.  Understands the risks and benefits of the procedure.  He would like to do a two-stage MAS Q procedure involving removal of the hardware placement of the larger nail followed by antibiotic cement spacer of the nonunion site with 6 weeks later coming back for autografting.  He understands the risks and benefits with the procedure.  We discussed removal of hardware of the right ankle was not recommended due to the osteolysis distally will likely lead to an unstable ankle.  He has no talar erosions no sign of infection.    OR Thursday     I explained that surgery and the nature of their condition are not without risks. These include, but are not limited to, bleeding, infection, neurovascular compromise, malunion, nonunion, hardware complications, wound complications, scarring, cosmetic defects, need for later and/or repeated surgeries, avascular necrosis, bone death due to initial trauma, pain, loss of ROM, loss of function, PTOA, deformity, stance/gait and/or functional abnormalities, thromboembolic complications, compartment syndrome, loss of limb, loss of life, anesthetic complications, and other imponderables. I explained that these can occur despite the adequacy of treatments rendered, and that their risks are heightened given the nature of their condition.  I have also discussed the importance not using nicotine products due to the increased risk of infection surgical wound healing complications and nonunion of the fracture.  They verbalized understanding.  No guarantees were made.  They would like to continue with surgery at this time. If appropriate family was involved with  surgical discussion.         This note/OR report was created with the assistance of  voice recognition software or phone  dictation.  There may be transcription errors as a result of using this technology however minimal. Effort has been made to assure accuracy of transcription but any obvious errors or omissions should be clarified with the author of the document.     This note was generated with the assistance of ambient listening technology. Verbal consent was obtained by the patient and accompanying visitor(s) for the recording of patient appointment to facilitate this note. I attest to having reviewed and edited the generated note for accuracy, though some syntax or spelling errors may persist. Please contact the author of this note for any clarification.       Domingo Young DO  Orthopedic Trauma Surgery  02/17/2025      No future appointments.                [1]   Current Outpatient Medications on File Prior to Visit   Medication Sig Dispense Refill    aspirin (ECOTRIN) 81 MG EC tablet Take 1 tablet (81 mg total) by mouth 2 (two) times a day. (Patient not taking: Reported on 2/17/2025) 60 tablet 0    benztropine (COGENTIN) 1 MG tablet Take 1 tablet (1 mg total) by mouth 2 (two) times daily. 60 tablet 1    docusate sodium (COLACE) 100 MG capsule Take 1 capsule (100 mg total) by mouth 2 (two) times daily. (Patient not taking: Reported on 2/17/2025) 30 capsule 0    DULoxetine (CYMBALTA) 30 MG capsule Take 1 capsule (30 mg total) by mouth once daily. (Patient not taking: Reported on 10/8/2024) 30 capsule 11    gabapentin (NEURONTIN) 400 MG capsule Take 1 capsule (400 mg total) by mouth 3 (three) times daily before meals. (Patient not taking: Reported on 10/8/2024) 90 capsule 11    hydrOXYzine pamoate (VISTARIL) 50 MG Cap Take 1 capsule (50 mg total) by mouth every evening. (Patient not taking: Reported on 10/8/2024) 30 capsule 0    methocarbamoL (ROBAXIN) 500 MG Tab Take 500 mg by mouth 3 (three) times daily.  (Patient not taking: Reported on 10/8/2024)      naloxegoL (MOVANTIK) 25 mg tablet Take 25 mg by mouth once daily. (Patient not taking: Reported on 10/8/2024) 30 tablet 0    oxyCODONE (ROXICODONE) 10 mg Tab immediate release tablet Take 1 tablet (10 mg total) by mouth every 6 (six) hours as needed for Pain. (Patient not taking: Reported on 2/17/2025) 25 tablet 0    paliperidone palmitate (INVEGA SUSTENNA) 156 mg/mL Syrg injection Inject 1 mL (156 mg total) into the muscle every 30 days. 1 mL 1    polyethylene glycol (GLYCOLAX) 17 gram PwPk Take 17 g by mouth 2 (two) times daily. (Patient not taking: Reported on 10/8/2024) 1 each 0     No current facility-administered medications on file prior to visit.

## 2025-02-19 ENCOUNTER — ANESTHESIA EVENT (OUTPATIENT)
Dept: SURGERY | Facility: HOSPITAL | Age: 25
End: 2025-02-19
Payer: MEDICAID

## 2025-02-20 ENCOUNTER — ANESTHESIA (OUTPATIENT)
Dept: SURGERY | Facility: HOSPITAL | Age: 25
End: 2025-02-20
Payer: MEDICAID

## 2025-02-20 ENCOUNTER — HOSPITAL ENCOUNTER (OUTPATIENT)
Facility: HOSPITAL | Age: 25
Discharge: HOME | End: 2025-02-20
Attending: ORTHOPAEDIC SURGERY | Admitting: ORTHOPAEDIC SURGERY
Payer: MEDICAID

## 2025-02-20 VITALS
OXYGEN SATURATION: 96 % | RESPIRATION RATE: 18 BRPM | DIASTOLIC BLOOD PRESSURE: 76 MMHG | BODY MASS INDEX: 27.3 KG/M2 | TEMPERATURE: 98 F | HEART RATE: 91 BPM | SYSTOLIC BLOOD PRESSURE: 132 MMHG | WEIGHT: 154.13 LBS

## 2025-02-20 DIAGNOSIS — S72.361K: ICD-10-CM

## 2025-02-20 PROBLEM — S72.301K: Status: ACTIVE | Noted: 2024-05-08

## 2025-02-20 PROCEDURE — C1769 GUIDE WIRE: HCPCS | Performed by: ORTHOPAEDIC SURGERY

## 2025-02-20 PROCEDURE — 63600175 PHARM REV CODE 636 W HCPCS: Performed by: ORTHOPAEDIC SURGERY

## 2025-02-20 PROCEDURE — 63600175 PHARM REV CODE 636 W HCPCS: Performed by: ANESTHESIOLOGY

## 2025-02-20 PROCEDURE — 63600175 PHARM REV CODE 636 W HCPCS: Performed by: NURSE ANESTHETIST, CERTIFIED REGISTERED

## 2025-02-20 PROCEDURE — C1713 ANCHOR/SCREW BN/BN,TIS/BN: HCPCS | Performed by: ORTHOPAEDIC SURGERY

## 2025-02-20 PROCEDURE — 27470 REPAIR OF THIGH: CPT | Mod: AS,RT,, | Performed by: PHYSICIAN ASSISTANT

## 2025-02-20 PROCEDURE — 25000003 PHARM REV CODE 250: Performed by: NURSE ANESTHETIST, CERTIFIED REGISTERED

## 2025-02-20 PROCEDURE — 27800903 OPTIME MED/SURG SUP & DEVICES OTHER IMPLANTS: Performed by: ORTHOPAEDIC SURGERY

## 2025-02-20 PROCEDURE — 36000710: Performed by: ORTHOPAEDIC SURGERY

## 2025-02-20 PROCEDURE — 27470 REPAIR OF THIGH: CPT | Mod: RT,,, | Performed by: ORTHOPAEDIC SURGERY

## 2025-02-20 PROCEDURE — 25000003 PHARM REV CODE 250: Performed by: PHYSICIAN ASSISTANT

## 2025-02-20 PROCEDURE — 25000003 PHARM REV CODE 250: Performed by: ORTHOPAEDIC SURGERY

## 2025-02-20 PROCEDURE — 36000711: Performed by: ORTHOPAEDIC SURGERY

## 2025-02-20 PROCEDURE — 27201423 OPTIME MED/SURG SUP & DEVICES STERILE SUPPLY: Performed by: ORTHOPAEDIC SURGERY

## 2025-02-20 PROCEDURE — 71000016 HC POSTOP RECOV ADDL HR: Performed by: ORTHOPAEDIC SURGERY

## 2025-02-20 PROCEDURE — 71000015 HC POSTOP RECOV 1ST HR: Performed by: ORTHOPAEDIC SURGERY

## 2025-02-20 PROCEDURE — 37000009 HC ANESTHESIA EA ADD 15 MINS: Performed by: ORTHOPAEDIC SURGERY

## 2025-02-20 PROCEDURE — 63600175 PHARM REV CODE 636 W HCPCS: Mod: JZ,TB | Performed by: NURSE ANESTHETIST, CERTIFIED REGISTERED

## 2025-02-20 PROCEDURE — 71000033 HC RECOVERY, INTIAL HOUR: Performed by: ORTHOPAEDIC SURGERY

## 2025-02-20 PROCEDURE — 37000008 HC ANESTHESIA 1ST 15 MINUTES: Performed by: ORTHOPAEDIC SURGERY

## 2025-02-20 DEVICE — SCREW LOK XL25 5X80MM: Type: IMPLANTABLE DEVICE | Site: FEMUR | Status: FUNCTIONAL

## 2025-02-20 DEVICE — SCREW XL25 IM NAIL 36X5MM: Type: IMPLANTABLE DEVICE | Site: FEMUR | Status: FUNCTIONAL

## 2025-02-20 DEVICE — PUTTY FBRGRFT BG BIOACTV 2CC: Type: IMPLANTABLE DEVICE | Site: FEMUR | Status: FUNCTIONAL

## 2025-02-20 DEVICE — CEMENT BONE ANTIBIO SIMPLEX P: Type: IMPLANTABLE DEVICE | Site: FEMUR | Status: FUNCTIONAL

## 2025-02-20 DEVICE — SCREW LOK IM NAIL XL25 56MM: Type: IMPLANTABLE DEVICE | Site: FEMUR | Status: FUNCTIONAL

## 2025-02-20 DEVICE — RFNA / 12MM / 340MM STANDARD BEND / STERILE: Type: IMPLANTABLE DEVICE | Site: FEMUR | Status: FUNCTIONAL

## 2025-02-20 RX ORDER — OXYCODONE AND ACETAMINOPHEN 5; 325 MG/1; MG/1
1 TABLET ORAL EVERY 4 HOURS PRN
Refills: 0 | Status: DISCONTINUED | OUTPATIENT
Start: 2025-02-20 | End: 2025-02-20 | Stop reason: HOSPADM

## 2025-02-20 RX ORDER — FENTANYL CITRATE 50 UG/ML
INJECTION, SOLUTION INTRAMUSCULAR; INTRAVENOUS
Status: DISCONTINUED | OUTPATIENT
Start: 2025-02-20 | End: 2025-02-20

## 2025-02-20 RX ORDER — SODIUM CHLORIDE 0.9 % (FLUSH) 0.9 %
10 SYRINGE (ML) INJECTION
Status: DISCONTINUED | OUTPATIENT
Start: 2025-02-20 | End: 2025-02-20 | Stop reason: HOSPADM

## 2025-02-20 RX ORDER — ONDANSETRON HYDROCHLORIDE 2 MG/ML
4 INJECTION, SOLUTION INTRAVENOUS DAILY PRN
Status: DISCONTINUED | OUTPATIENT
Start: 2025-02-20 | End: 2025-02-20 | Stop reason: HOSPADM

## 2025-02-20 RX ORDER — METHOCARBAMOL 750 MG/1
750 TABLET, FILM COATED ORAL 3 TIMES DAILY
Status: DISCONTINUED | OUTPATIENT
Start: 2025-02-20 | End: 2025-02-20 | Stop reason: HOSPADM

## 2025-02-20 RX ORDER — CEFAZOLIN SODIUM 1 G/3ML
2 INJECTION, POWDER, FOR SOLUTION INTRAMUSCULAR; INTRAVENOUS
Status: COMPLETED | OUTPATIENT
Start: 2025-02-20 | End: 2025-02-20

## 2025-02-20 RX ORDER — PROPOFOL 10 MG/ML
VIAL (ML) INTRAVENOUS
Status: DISCONTINUED | OUTPATIENT
Start: 2025-02-20 | End: 2025-02-20

## 2025-02-20 RX ORDER — MORPHINE SULFATE 4 MG/ML
4 INJECTION, SOLUTION INTRAMUSCULAR; INTRAVENOUS EVERY 6 HOURS PRN
Refills: 0 | Status: DISCONTINUED | OUTPATIENT
Start: 2025-02-20 | End: 2025-02-20 | Stop reason: HOSPADM

## 2025-02-20 RX ORDER — SODIUM CHLORIDE 9 MG/ML
INJECTION, SOLUTION INTRAVENOUS CONTINUOUS
Status: DISCONTINUED | OUTPATIENT
Start: 2025-02-20 | End: 2025-02-20 | Stop reason: HOSPADM

## 2025-02-20 RX ORDER — KETOROLAC TROMETHAMINE 30 MG/ML
INJECTION, SOLUTION INTRAMUSCULAR; INTRAVENOUS
Status: DISCONTINUED | OUTPATIENT
Start: 2025-02-20 | End: 2025-02-20

## 2025-02-20 RX ORDER — MIDAZOLAM HYDROCHLORIDE 1 MG/ML
INJECTION INTRAMUSCULAR; INTRAVENOUS
Status: DISCONTINUED | OUTPATIENT
Start: 2025-02-20 | End: 2025-02-20

## 2025-02-20 RX ORDER — OXYCODONE AND ACETAMINOPHEN 10; 325 MG/1; MG/1
1 TABLET ORAL EVERY 4 HOURS PRN
Qty: 42 TABLET | Refills: 0 | Status: SHIPPED | OUTPATIENT
Start: 2025-02-20 | End: 2025-02-27

## 2025-02-20 RX ORDER — SODIUM CHLORIDE, SODIUM LACTATE, POTASSIUM CHLORIDE, CALCIUM CHLORIDE 600; 310; 30; 20 MG/100ML; MG/100ML; MG/100ML; MG/100ML
INJECTION, SOLUTION INTRAVENOUS CONTINUOUS
Status: DISCONTINUED | OUTPATIENT
Start: 2025-02-20 | End: 2025-02-20 | Stop reason: HOSPADM

## 2025-02-20 RX ORDER — MIDAZOLAM HYDROCHLORIDE 2 MG/2ML
2 INJECTION, SOLUTION INTRAMUSCULAR; INTRAVENOUS ONCE AS NEEDED
Status: DISCONTINUED | OUTPATIENT
Start: 2025-02-20 | End: 2025-02-20 | Stop reason: HOSPADM

## 2025-02-20 RX ORDER — DEXAMETHASONE SODIUM PHOSPHATE 4 MG/ML
INJECTION, SOLUTION INTRA-ARTICULAR; INTRALESIONAL; INTRAMUSCULAR; INTRAVENOUS; SOFT TISSUE
Status: DISCONTINUED | OUTPATIENT
Start: 2025-02-20 | End: 2025-02-20

## 2025-02-20 RX ORDER — DIPHENHYDRAMINE HYDROCHLORIDE 50 MG/ML
25 INJECTION INTRAMUSCULAR; INTRAVENOUS ONCE
Status: DISCONTINUED | OUTPATIENT
Start: 2025-02-20 | End: 2025-02-20 | Stop reason: HOSPADM

## 2025-02-20 RX ORDER — ONDANSETRON HYDROCHLORIDE 2 MG/ML
INJECTION, SOLUTION INTRAMUSCULAR; INTRAVENOUS
Status: DISCONTINUED | OUTPATIENT
Start: 2025-02-20 | End: 2025-02-20

## 2025-02-20 RX ORDER — VANCOMYCIN HYDROCHLORIDE 1 G/20ML
INJECTION, POWDER, LYOPHILIZED, FOR SOLUTION INTRAVENOUS
Status: DISCONTINUED | OUTPATIENT
Start: 2025-02-20 | End: 2025-02-20 | Stop reason: HOSPADM

## 2025-02-20 RX ORDER — MEPERIDINE HYDROCHLORIDE 25 MG/ML
12.5 INJECTION INTRAMUSCULAR; INTRAVENOUS; SUBCUTANEOUS ONCE AS NEEDED
Status: COMPLETED | OUTPATIENT
Start: 2025-02-20 | End: 2025-02-20

## 2025-02-20 RX ORDER — PROCHLORPERAZINE EDISYLATE 5 MG/ML
5 INJECTION INTRAMUSCULAR; INTRAVENOUS EVERY 30 MIN PRN
Status: DISCONTINUED | OUTPATIENT
Start: 2025-02-20 | End: 2025-02-20 | Stop reason: HOSPADM

## 2025-02-20 RX ORDER — OXYCODONE AND ACETAMINOPHEN 10; 325 MG/1; MG/1
1 TABLET ORAL EVERY 4 HOURS PRN
Refills: 0 | Status: DISCONTINUED | OUTPATIENT
Start: 2025-02-20 | End: 2025-02-20 | Stop reason: HOSPADM

## 2025-02-20 RX ORDER — MUPIROCIN 20 MG/G
OINTMENT TOPICAL
Status: DISCONTINUED | OUTPATIENT
Start: 2025-02-20 | End: 2025-02-20 | Stop reason: HOSPADM

## 2025-02-20 RX ORDER — ONDANSETRON HYDROCHLORIDE 2 MG/ML
4 INJECTION, SOLUTION INTRAVENOUS EVERY 6 HOURS PRN
Status: DISCONTINUED | OUTPATIENT
Start: 2025-02-20 | End: 2025-02-20 | Stop reason: HOSPADM

## 2025-02-20 RX ORDER — ASPIRIN 81 MG/1
81 TABLET ORAL 2 TIMES DAILY
Qty: 60 TABLET | Refills: 0 | Status: SHIPPED | OUTPATIENT
Start: 2025-02-20 | End: 2025-03-22

## 2025-02-20 RX ORDER — HYDROMORPHONE HYDROCHLORIDE 2 MG/ML
0.4 INJECTION, SOLUTION INTRAMUSCULAR; INTRAVENOUS; SUBCUTANEOUS EVERY 5 MIN PRN
Status: DISCONTINUED | OUTPATIENT
Start: 2025-02-20 | End: 2025-02-20 | Stop reason: HOSPADM

## 2025-02-20 RX ORDER — KETOROLAC TROMETHAMINE 30 MG/ML
15 INJECTION, SOLUTION INTRAMUSCULAR; INTRAVENOUS ONCE
Status: DISCONTINUED | OUTPATIENT
Start: 2025-02-20 | End: 2025-02-20 | Stop reason: HOSPADM

## 2025-02-20 RX ORDER — LIDOCAINE HYDROCHLORIDE 20 MG/ML
INJECTION, SOLUTION EPIDURAL; INFILTRATION; INTRACAUDAL; PERINEURAL
Status: DISCONTINUED | OUTPATIENT
Start: 2025-02-20 | End: 2025-02-20

## 2025-02-20 RX ORDER — METHOCARBAMOL 100 MG/ML
1000 INJECTION, SOLUTION INTRAMUSCULAR; INTRAVENOUS ONCE AS NEEDED
Status: COMPLETED | OUTPATIENT
Start: 2025-02-20 | End: 2025-02-20

## 2025-02-20 RX ORDER — SODIUM CHLORIDE, SODIUM GLUCONATE, SODIUM ACETATE, POTASSIUM CHLORIDE AND MAGNESIUM CHLORIDE 30; 37; 368; 526; 502 MG/100ML; MG/100ML; MG/100ML; MG/100ML; MG/100ML
INJECTION, SOLUTION INTRAVENOUS CONTINUOUS
Status: DISCONTINUED | OUTPATIENT
Start: 2025-02-20 | End: 2025-02-20 | Stop reason: HOSPADM

## 2025-02-20 RX ORDER — ROCURONIUM BROMIDE 10 MG/ML
INJECTION, SOLUTION INTRAVENOUS
Status: DISCONTINUED | OUTPATIENT
Start: 2025-02-20 | End: 2025-02-20

## 2025-02-20 RX ADMIN — ROCURONIUM BROMIDE 50 MG: 10 SOLUTION INTRAVENOUS at 09:02

## 2025-02-20 RX ADMIN — SODIUM CHLORIDE, SODIUM GLUCONATE, SODIUM ACETATE, POTASSIUM CHLORIDE AND MAGNESIUM CHLORIDE: 526; 502; 368; 37; 30 INJECTION, SOLUTION INTRAVENOUS at 09:02

## 2025-02-20 RX ADMIN — FENTANYL CITRATE 100 MCG: 50 INJECTION, SOLUTION INTRAMUSCULAR; INTRAVENOUS at 09:02

## 2025-02-20 RX ADMIN — HYDROMORPHONE HYDROCHLORIDE 0.4 MG: 2 INJECTION, SOLUTION INTRAMUSCULAR; INTRAVENOUS; SUBCUTANEOUS at 11:02

## 2025-02-20 RX ADMIN — KETOROLAC TROMETHAMINE 15 MG: 30 INJECTION, SOLUTION INTRAMUSCULAR at 10:02

## 2025-02-20 RX ADMIN — FENTANYL CITRATE 50 MCG: 50 INJECTION, SOLUTION INTRAMUSCULAR; INTRAVENOUS at 09:02

## 2025-02-20 RX ADMIN — MIDAZOLAM HYDROCHLORIDE 2 MG: 1 INJECTION, SOLUTION INTRAMUSCULAR; INTRAVENOUS at 09:02

## 2025-02-20 RX ADMIN — ONDANSETRON 4 MG: 2 INJECTION INTRAMUSCULAR; INTRAVENOUS at 09:02

## 2025-02-20 RX ADMIN — MEPERIDINE HYDROCHLORIDE 12.5 MG: 25 INJECTION INTRAMUSCULAR; INTRAVENOUS; SUBCUTANEOUS at 10:02

## 2025-02-20 RX ADMIN — MUPIROCIN: 20 OINTMENT TOPICAL at 08:02

## 2025-02-20 RX ADMIN — FENTANYL CITRATE 50 MCG: 50 INJECTION, SOLUTION INTRAMUSCULAR; INTRAVENOUS at 10:02

## 2025-02-20 RX ADMIN — PROPOFOL 150 MG: 10 INJECTION, EMULSION INTRAVENOUS at 09:02

## 2025-02-20 RX ADMIN — CEFAZOLIN 2 G: 330 INJECTION, POWDER, FOR SOLUTION INTRAMUSCULAR; INTRAVENOUS at 09:02

## 2025-02-20 RX ADMIN — DEXAMETHASONE SODIUM PHOSPHATE 8 MG: 4 INJECTION, SOLUTION INTRA-ARTICULAR; INTRALESIONAL; INTRAMUSCULAR; INTRAVENOUS; SOFT TISSUE at 09:02

## 2025-02-20 RX ADMIN — LIDOCAINE HYDROCHLORIDE 80 MG: 20 INJECTION, SOLUTION INTRAVENOUS at 09:02

## 2025-02-20 RX ADMIN — OXYCODONE HYDROCHLORIDE AND ACETAMINOPHEN 1 TABLET: 10; 325 TABLET ORAL at 11:02

## 2025-02-20 RX ADMIN — METHOCARBAMOL 1000 MG: 100 INJECTION, SOLUTION INTRAMUSCULAR; INTRAVENOUS at 10:02

## 2025-02-20 RX ADMIN — ROCURONIUM BROMIDE 20 MG: 10 SOLUTION INTRAVENOUS at 09:02

## 2025-02-20 RX ADMIN — SUGAMMADEX 100 MG: 100 INJECTION, SOLUTION INTRAVENOUS at 10:02

## 2025-02-20 NOTE — PROGRESS NOTES
1240 pt stated pain is now tolerable but he needs another 2 hours before he will feel good enough pain wise to go home, pt resting comfortably in bed w/o complaints nor requests  1500 pt stated no ride until 5pm, that dad doesn't get off work til 4pm in [place]  1515 spoke to dad to clarify and he said yes but he will be here 5 or 6pm

## 2025-02-20 NOTE — ANESTHESIA POSTPROCEDURE EVALUATION
Anesthesia Post Evaluation    Patient: Venkata Douglas    Procedure(s) Performed: Procedure(s) (LRB):  INSERTION, INTRAMEDULLARY CLAUDETTE, FEMUR (Right)    Final Anesthesia Type: general      Patient location during evaluation: PACU  Patient participation: Yes- Able to Participate  Level of consciousness: awake  Post-procedure vital signs: reviewed and stable  Pain management: adequate  Airway patency: patent    PONV status at discharge: vomiting (controlled) and nausea (controlled)  Anesthetic complications: no      Cardiovascular status: hemodynamically stable  Respiratory status: spontaneous ventilation and unassisted  Hydration status: euvolemic  Follow-up not needed.  Comments:                  Vitals Value Taken Time   BP 99/55 02/20/25 10:43   Temp 97 02/20/25 10:50   Pulse 93 02/20/25 10:49   Resp 13 02/20/25 10:49   SpO2 98 % 02/20/25 10:49   Vitals shown include unfiled device data.      No case tracking events are documented in the log.      Pain/Keli Score: No data recorded

## 2025-02-20 NOTE — OP NOTE
OPERATIVE REPORT    Patient: Venkata Douglas   : 2000    MRN: [id number]  Date: 2025      Surgeon:Domingo Young DO  Assistant: Alan Lockhart was essential, part of the procedure including deep hardware placement and deep closure.  No senior assistant was availible  Preoperative Diagnosis:  Right femoral shaft nonunion fracture, tobacco abuse  Postoperative Diagnosis: Same  Procedure:   1) right femur nonunion repair-CPT 37359  2) right femur deep hardware removal-CPT 14843    Anesthesiologist: Jean Paul Hua MD  OR Staff: Circulator: Dorene Olivia RN  Relief Circulator: Cuauhtemoc Webster RN  Scrub Person: Nisa Plaza ST  Implants: 100cc  EBL: See anesthesia note  Complications: None  Disposition: To PACU, stable    Indications: Venkata Douglas is a 24 y.o. male presenting with the aforementioned findings. The procedure is indicated to best obtain and maintain stability and reduction of the fracture to alleviate pain and allow mobility.  The patient is awake and alert. After thorough discussion of the risks, benefits, expected outcomes, and alternatives to surgical intervention, the patient agreed to proceed with surgical treatment.  Specific risks discussed included, but were not limited to: superficial or deep infection, wound healing complications, DVT/PE, significant bleeding requiring transfusion, damage to named anatomic structures in the immediate area including named neurovascular structures, implant failure, knee pain, and general risks of anesthesia.  The patient voiced understanding and written as well as verbal consent was obtained by myself prior to the procedure.    Patient understands nicotine does lead to nonunion problems with orthopedic surgery including infection and a hardware complications.  Plan for today is removal of the femoral nail takedown of the nonunion site placement graft  and revision fixation of the femur.    Procedure Note:  The patient was brought  back to the OR and placed supine on the OR table. After successful induction of anesthesia by anesthesia staff, the patient was positioned in the supine position and all bony prominences were padded appropriately.  A small bump was placed under the ipsilateral hip for proper rotational alignment of the extremity.  The surgical field was then provisionally cleansed and then prepped and draped in the usual sterile fashion.    At this time a time-out was performed, with the correct patient, site, and procedure identified.  The universal time out as well as sign your site protocols were followed.  Preoperative antibiotics were verified as administered.       Previous incisions were utilized I then accessed the deep hardware and removed it without complication.    Using the previous incisions. Bovie was used for hemostasis.  The patella tendon was spared, as we retracted this laterally. Soft tissues and patella were protected, and a guide wire was placed in the appropriate starting position.  Once this position was confirmed with  C-arm  in multiple planes, the wire was advanced into the distal femur. The starting reamer was then used, through protected soft tissues to create the entry hole over the guide wire.  Next, a long beaded-tip reaming wire was placed into the intramedullary canal of the femur.      I then made a lateral thigh incision through the vastus lateralis.  Accessed the nonunion site and took down the fibrous nonunion anteriorly and posteriorly.  The lateral cortex appeared to be intact.  We did this with with a rongeur curette back to bleeding bone.  I then placed fiber graft and Campos bio active implant        C-arm images in multiple planes confirmed successful crossing of the fracture site and intramedullary location of this wire in the proximal segment. Intramedullary length was measured, and the intramedullary canal was sequentially reamed to a final diameter 1.5mm above the final nail diameter.  Care was taken to maintain fracture reduction during the reaming process, and no eccentric reaming was performed    A [employer] retrograde femur nail with the above measurements was assembled to the jig, and inserted into the femur over the guide wire.  C-arm imaging confirmed full insertion of the nail, with no intra-articular prominence.  Fracture reduction and alignment was well maintained.  Interlocking screws were placed distally using the jig, and proximal interlocking screws were placed using free-hand perfect [place] technique.  All interlocking screws were placed through small incisions, blunt dissection, and with neuro-vascular structures protected.     Satisfied length alignment rotation I then created an antibiotic cement spacer with 4 g of vancomycin methylene blue and gentamicin on the back table and placed he had in the fracture site for a stage II MAS Q    Final C-arm images confirmed excellent alignment and reduction of the fracture.  All hardware was in good position.  The leg clinically appeared to have normal rotational alignment as compared to exam of the opposite side.     Fluoroscopic evaluation of the hip after the nail was placed showed no femoral neck injury.  In addition, exam of the knee after nail placement showed no signs of ligamentous laxity.    The incisions were then irrigated using copious sterile saline and then closed in layered fashion.  The leg was sterilely cleansed and dressed.    The patient was then subsequently extubated and transferred to to PACU in a stable condition.    All sponge and needle counts were correct at the end of the case.  I was present and participated in all aspects of the procedure.    Prognosis:  The patient will be kept WBAT on the ipsilateral extremity.     DVT prophylaxis will be continued postoperatively.  The patient is at risk of continued pain and disability - we will encourage mobility.  Patient may need return to the OR for excisional debridement  or washout if infection/skin necrosis is observed.    This note/OR report was created with the assistance of  voice recognition software or phone  dictation.  There may be transcription errors as a result of using this technology however minimal. Effort has been made to assure accuracy of transcription but any obvious errors or omissions should be clarified with the author of the document.       Domingo Young, DO  Orthopedic Trauma Surgery

## 2025-02-20 NOTE — PROGRESS NOTES
1530 while rounding on patient to deliver his sandwich tray and update him on his dads potential delay, I noticed his home meds which were delivered earlier by pharmacy were open. I asked patient if he took meds he said yes about 30mins ago(which would be approx 3pm), I thanked patient for info, reiterated to please notify nurse via red button with any changes or  needs as previously expressed. Pt agreed. I then notified and removed meds from room, counted with a witness hang olvera, all 41 tablets were accounted for as patient stated and were placed in lock box at [facility] station until DC.

## 2025-02-20 NOTE — ANESTHESIA PREPROCEDURE EVALUATION
"                                                                                                             02/20/2025  Venkata Douglas is a 24 y.o., male.  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     Schizophrenia, unspecified     pt stated "I do not have that dx"       Problem List[1]    Review of patient's allergies indicates:  No Known Allergies    Current Outpatient Medications   Medication Instructions    aspirin (ECOTRIN) 81 mg, Oral, 2 times daily    benztropine (COGENTIN) 1 mg, Oral, 2 times daily    docusate sodium (COLACE) 100 mg, Oral, 2 times daily    DULoxetine (CYMBALTA) 30 mg, Oral, Daily    ergocalciferol (ERGOCALCIFEROL) 50,000 Units, Oral, Every 7 days    gabapentin (NEURONTIN) 400 mg, Oral, 3 times daily before meals    hydrOXYzine pamoate (VISTARIL) 50 mg, Oral, Nightly    methocarbamoL (ROBAXIN) 500 mg, 3 times daily    naloxegoL (MOVANTIK) 25 mg, Oral, Daily    oxyCODONE (ROXICODONE) 10 mg, Oral, Every 6 hours PRN    paliperidone palmitate (INVEGA SUSTENNA) 156 mg, Intramuscular, Every 30 days    polyethylene glycol (GLYCOLAX) 17 g, Oral, 2 times daily       Past Surgical History:   Procedure Laterality Date    APPLICATION OF SPLINT Left 5/8/2024    Procedure: APPLICATION, SPLINT;  Surgeon: Phong Reaves MD;  Location: [facility] OR;  Service: Orthopedics;  Laterality: Left;    INCISION AND DRAINAGE, LOWER EXTREMITY Right 5/8/2024    Procedure: INCISION AND DRAINAGE, LOWER EXTREMITY;  Surgeon: Phong Reaves MD;  Location: [facility] OR;  Service: Orthopedics;  Laterality: Right;    INSERTION, TRACTION PIN, SKELETAL Right 5/8/2024    Procedure: INSERTION, TRACTION PIN, SKELETAL;  Surgeon: Phong Reaves" "MD JOHN;  Location: [facility];  Service: Orthopedics;  Laterality: Right;    INTRAMEDULLARY RODDING OF FEMUR Right 5/9/2024    Procedure: INSERTION, INTRAMEDULLARY CLAUDETTE, FEMUR;  Surgeon: Phong Reaves MD;  Location: [facility] OR;  Service: Orthopedics;  Laterality: Right;  [employer] rep notified    OPEN REDUCTION AND INTERNAL FIXATION (ORIF) OF FRACTURE OF DISTAL RADIUS Right 5/9/2024    Procedure: ORIF, FRACTURE, RADIUS, DISTAL;  Surgeon: Phong Reaves MD;  Location: [facility] OR;  Service: Orthopedics;  Laterality: Right;    OPEN REDUCTION AND INTERNAL FIXATION (ORIF) OF INJURY OF ANKLE Right 5/8/2024    Procedure: ORIF, ANKLE;  Surgeon: Phong Reaves MD;  Location: [facility] OR;  Service: Orthopedics;  Laterality: Right;  ORIF   RIGHT ANKLE AND RIGHT FEMUR  WITH I&D  //  REQ 1730    PINNING, TOE, PERCUTANEOUS Right 5/8/2024    Procedure: PINNING, TOE, PERCUTANEOUS;  Surgeon: Phong Reaves MD;  Location: [facility];  Service: Orthopedics;  Laterality: Right;  3rd, 4th and fifth toes         Lab Results   Component Value Date    WBC 8.50 02/14/2025    HGB 14.4 02/14/2025    HCT 43.1 02/14/2025    MCV 91.7 02/14/2025     02/14/2025          BMP  Lab Results   Component Value Date     02/14/2025    K 3.7 02/14/2025    CO2 27 02/14/2025    GLUCOSE 71 (L) 02/14/2025    BUN 15.5 02/14/2025    CREATININE 0.84 02/14/2025    CALCIUM 9.5 02/14/2025    ANIONGAP 11 06/06/2023    BILITOT 0.4 02/14/2025    AST 12 02/14/2025    ALT 19 02/14/2025        INR  No results for input(s): "PT", "INR", "PROTIME", "APTT" in the last 72 hours.        Diagnostic Studies:      EKG:  Results for orders placed or performed during the hospital encounter of 07/02/23   EKG 12-lead    Collection Time: 07/02/23  1:01 PM    Narrative    Test Reason : R25.2,    Vent. Rate : 100 BPM     Atrial Rate : 100 BPM     P-R Int : 134 ms          QRS Dur : 088 ms      QT Int : 332 ms       P-R-T Axes : 088 070 014 degrees     QTc Int : 428 ms    Normal " sinus rhythm  Normal ECG  When compared with ECG of 08-JUN-2023 07:06,  No significant change was found  Confirmed by Ramiro Negrete MD ([id number]) on 7/3/2023 1:09:54 AM    Referred By:             Confirmed By:Ramiro Negrete MD       No results found for this or any previous visit.            Pre-op Assessment    I have reviewed the Patient Summary Reports.    I have reviewed the NPO Status.   I have reviewed the Medications.     Review of Systems  Anesthesia Hx:  No problems with previous Anesthesia             Denies Family Hx of Anesthesia complications.    Denies Personal Hx of Anesthesia complications.                    Cardiovascular:  Cardiovascular Normal                   No Cardiac Complaints                           Pulmonary:  Pulmonary Normal        No Pulmonary Complaints               Hepatic/GI:        No Current GERD Sx                 Physical Exam  General: Alert, Oriented and Flat Affect    Airway:  Mallampati: II   Mouth Opening: Normal  TM Distance: Normal  Tongue: Normal  Neck ROM: Normal ROM    Dental:  Intact    Chest/Lungs:  Clear to auscultation, Normal Respiratory Rate    Heart:  Rate: Normal  Rhythm: Regular Rhythm        Anesthesia Plan  Type of Anesthesia, risks & benefits discussed:    Anesthesia Type: Gen ETT  Intra-op Monitoring Plan: Standard ASA Monitors  Post Op Pain Control Plan: multimodal analgesia  Induction:  IV and Inhalation  Airway Plan: Direct, Post-Induction  Informed Consent: Informed consent signed with the Patient and all parties understand the risks and agree with anesthesia plan.  All questions answered.   ASA Score: 2  Day of Surgery Review of History & Physical: H&P Update referred to the surgeon/provider.  Anesthesia Plan Notes: Discussed Anesthetic Plan w/ Pt/Family. Questions Entertained. Accepted.    Ready For Surgery From Anesthesia Perspective.     .           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

## 2025-02-20 NOTE — DISCHARGE INSTRUCTIONS
Follow prescriptions    -NO driving and NO alcohol consumption for 24 hours and while taking narcotic pain medication.    -call for and keep Follow up appointment    -Monitor for signs of INFECTION: redness, swelling, drainage/pus/foul odor, fever, chills.    -Monitor extremity for good circulation (pink, warm, etc). If you received a nerve block, it can last 8-12 hours.    -Apply ICE and ELEVATE extremity as needed to aid in pain and inflammation.    -Report to your nearest ER/Notify your doctor if you experience any SUDDEN/SEVERE chest pain/abdominal pain, weakness, trouble breathing, or uncontrolled pain.    BLEEDING: if you experience uncontrollable bleeding, contact your doctor and go to ER.    NAUSEA: due to the anesthesia, you may experience nausea for up to 24 hours. If nausea and vomiting last longer, contact your doctor.     INFECTION:  watch for any signs or symptoms of infection such as chills, fever, redness or drainage at surgical site. Notify your doctor.     PAIN : take your pain medications as directed. If the pain medications are not helping, notify your doctor.

## 2025-02-20 NOTE — INTERVAL H&P NOTE
The patient has been examined and the H&P has been reviewed:    I concur with the findings and no changes have occurred since H&P was written.    Surgery risks, benefits and alternative options discussed and understood by patient/family.Discussion of orthopedic surgery limitations and risks, benefits, alternatives, and complications of operative and non-operative treatments were discussed with patient and family. They understand, agree, and want to proceed with operation/procedure.   Discussion of orthopedic surgery limitations and risks: recurrence of problem, pain, deformity: problems with prosthesis, prosthesis dislocation, prosthesis loosening, prosthesis failure, problems with implanted hardware, loosening of implanted hardware, failure of implanted hardware, reaction of soft tissue to implanted hardware, protrusion of implanted hardware, pain from implanted hardware, stiffness, nerve injury, vascular injury, skin necrosis, tendon adhesion, malunion, non union, need for further surgery, risk of amputation.       Plan for nonunion repair today with possible antibiotic spacer placement and proceed with autograft placement in about 6 weeks. Patient voiced understanding of plan.       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

## 2025-02-20 NOTE — PROGRESS NOTES
Upon rounds pt is up dressed in chair on side of bed, self disconnected from all monitors asking if he can go outside to wait for his ride, I advised patient that was not an option but that we would gladly wheel him down once his ride is here. Pt verbalized understanding

## 2025-02-20 NOTE — ANESTHESIA PROCEDURE NOTES
Intubation    Date/Time: 2/20/2025 9:13 AM    Performed by: Osorio Green CRNA  Authorized by: Osorio Green CRNA    Intubation:     Induction:  Intravenous    Intubated:  Postinduction    Mask Ventilation:  Easy mask    Attempts:  1    Attempted By:  CRNA    Method of Intubation:  Direct    Blade:  Roberto 3    Laryngeal View Grade: Grade I - full view of cords      Difficult Airway Encountered?: No      Complications:  None    Airway Device:  Oral endotracheal tube    Airway Device Size:  7.5    Style/Cuff Inflation:  Cuffed (inflated to minimal occlusive pressure)    Tube secured:  22    Secured at:  The lips    Placement Verified By:  Capnometry    Complicating Factors:  None    Findings Post-Intubation:  BS equal bilateral

## 2025-02-20 NOTE — TRANSFER OF CARE
Anesthesia Transfer of Care Note    Patient: Venkata Douglas    Procedure(s) Performed: Procedure(s) (LRB):  INSERTION, INTRAMEDULLARY CLAUDETTE, FEMUR (Right)    Patient location: PACU    Anesthesia Type: general    Transport from OR: Transported from OR on room air with adequate spontaneous ventilation    Post pain: adequate analgesia    Post assessment: no apparent anesthetic complications    Post vital signs: stable    Level of consciousness: awake and alert    Nausea/Vomiting: no nausea/vomiting    Complications: none    Transfer of care protocol was followed      Last vitals: Visit Vitals  BP (!) 99/55   Pulse 75   Temp 36.9 °C (98.4 °F) (Oral)   Resp 17   Wt 69.9 kg (154 lb 1.6 oz)   SpO2 98%   BMI 27.30 kg/m²

## 2025-02-20 NOTE — DISCHARGE SUMMARY
[facility] - Periop Services  Discharge Note  Short Stay    Procedure(s) (LRB):  INSERTION, INTRAMEDULLARY CLAUDETTE, FEMUR (Right)      OUTCOME: Patient tolerated treatment/procedure well without complication and is now ready for discharge.    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    FOLLOWUP: In clinic    DISCHARGE INSTRUCTIONS:    Discharge Procedure Orders   Notify your health care provider if you experience any of the following:  temperature >100.4     Notify your health care provider if you experience any of the following:  persistent nausea and vomiting or diarrhea     Notify your health care provider if you experience any of the following:  severe uncontrolled pain     Notify your health care provider if you experience any of the following:  redness, tenderness, or signs of infection (pain, swelling, redness, odor or green/yellow discharge around incision site)     Notify your health care provider if you experience any of the following:  difficulty breathing or increased cough     Notify your health care provider if you experience any of the following:  persistent dizziness, light-headedness, or visual disturbances     Notify your health care provider if you experience any of the following:  increased confusion or weakness     Remove dressing in 48 hours   Order Comments: Begin daily dry dressing changes POD2. May cover with soft dressing or large band aid. Keep clean and dry. No showers to POD 7. Do not submerge. Do not apply ointments or creams.     Activity as tolerated     Weight bearing restrictions (specify):   Order Comments: WBAT        TIME SPENT ON DISCHARGE: 15 minutes

## 2025-03-05 DIAGNOSIS — S72.361K: ICD-10-CM

## 2025-03-05 RX ORDER — OXYCODONE AND ACETAMINOPHEN 10; 325 MG/1; MG/1
1 TABLET ORAL EVERY 6 HOURS PRN
Qty: 28 TABLET | Refills: 0 | Status: SHIPPED | OUTPATIENT
Start: 2025-03-05 | End: 2025-03-12

## 2025-03-13 ENCOUNTER — OFFICE VISIT (OUTPATIENT)
Dept: ORTHOPEDICS | Facility: CLINIC | Age: 25
End: 2025-03-13
Payer: MEDICAID

## 2025-03-13 ENCOUNTER — HOSPITAL ENCOUNTER (OUTPATIENT)
Dept: RADIOLOGY | Facility: CLINIC | Age: 25
Discharge: HOME | End: 2025-03-13
Attending: PHYSICIAN ASSISTANT
Payer: MEDICAID

## 2025-03-13 VITALS
WEIGHT: 154.13 LBS | HEART RATE: 81 BPM | SYSTOLIC BLOOD PRESSURE: 118 MMHG | BODY MASS INDEX: 27.31 KG/M2 | DIASTOLIC BLOOD PRESSURE: 79 MMHG | HEIGHT: 63 IN

## 2025-03-13 DIAGNOSIS — S72.351K CLOSED DISP COMMINUTED FRACTURE OF SHAFT OF RIGHT FEMUR WITH NONUNION: ICD-10-CM

## 2025-03-13 DIAGNOSIS — S72.351K CLOSED DISP COMMINUTED FRACTURE OF SHAFT OF RIGHT FEMUR WITH NONUNION: Primary | ICD-10-CM

## 2025-03-13 PROCEDURE — 73552 X-RAY EXAM OF FEMUR 2/>: CPT | Mod: RT,,, | Performed by: PHYSICIAN ASSISTANT

## 2025-03-13 PROCEDURE — 99024 POSTOP FOLLOW-UP VISIT: CPT | Mod: ,,, | Performed by: ORTHOPAEDIC SURGERY

## 2025-03-13 PROCEDURE — 1159F MED LIST DOCD IN RCRD: CPT | Mod: CPTII,,, | Performed by: ORTHOPAEDIC SURGERY

## 2025-03-13 PROCEDURE — 3074F SYST BP LT 130 MM HG: CPT | Mod: CPTII,,, | Performed by: ORTHOPAEDIC SURGERY

## 2025-03-13 PROCEDURE — 3078F DIAST BP <80 MM HG: CPT | Mod: CPTII,,, | Performed by: ORTHOPAEDIC SURGERY

## 2025-03-13 NOTE — PROGRESS NOTES
"Subjective:       Patient ID: Venkata Douglas is a 24 y.o. male.  Chief Complaint   Patient presents with    Right Femur - Post-op Evaluation     3 wks f/u imn right femur fx, sx 2/20/25-5/21/25, wbat, ambulates with crutches, denies pain,        HPI:  History of Present Illness    HPI:  History of Present Illness    HPI:  Mr. Douglas presents for a follow-up visit after a surgical procedure on his leg. He reports feeling good and can walk without assistance, though he is able to put some weight on the affected leg. His leg feels good when asked specifically about the area where staples are present. This follow-up visit is for staple removal, with the next appointment scheduled for approximately six weeks later. Mr. Douglas's demeanor is described as calm and composed, without exhibiting anxiety typically seen in similar cases.    He denies needing a work or school note.    PREVIOUS TREATMENTS:  Mr. Douglas recently underwent surgery involving staples, which are being removed during this visit. He has progressed to walking without assistance and can now put some weight on the affected area.    IMAGING:  Recent X-rays were taken of the patient. The practitioner reports that the images look good and no changes are visible yet, which is as expected. More healing is anticipated to be visible by the next visit, scheduled in approximately six weeks.           ROS:  Constitutional: Denies fever chills  Eyes: No change in vision  ENT: No ringing or current infections  CV: No chest pain  Resp: No labored breathing  MSK: Pain evident at site of injury located in HPI,   Integ: No signs of abrasions or lacerations  Neuro: No numbness or tingling  Lymphatic: No swelling outside the area of injury     Medications Ordered Prior to Encounter[1]       Objective:      /79   Pulse 81   Ht 5' 3" (1.6 m)   Wt 69.9 kg (154 lb 1.6 oz)   BMI 27.30 kg/m²   General the patient is alert and oriented x3 no acute distress " nontoxic-appearing appropriate affect.    Constitutional: Vital signs are examined and stable.  Resp: No signs of labored breathing      RLE: -Skin:  Staples intact and removed no signs of complication           -MSK: : Hip and Knee F/E, EHL/FHL, Gastroc/Tib anterior Strength 5/5           -Neuro:  Sensation intact to light touch L3-S1 dermatomes           -Lymphatic: No signs of lymphadenopathy           -CV: Capillary refill is less than 2 seconds. DP/PT pulses  2/4. Compartments soft and compressible.   Body mass index is 27.3 kg/m².  Ideal body weight: 56.9 kg (125 lb 7.1 oz)  Adjusted ideal body weight: 62.1 kg (136 lb 14.5 oz)  Hemoglobin A1C   Date Value Ref Range Status   06/08/2023 4.5 4.0 - 5.6 % Final     Comment:     ADA Screening Guidelines:  5.7-6.4%  Consistent with prediabetes  >or=6.5%  Consistent with diabetes    High levels of fetal hemoglobin interfere with the HbA1C  assay. Heterozygous hemoglobin variants (HbS, HgC, etc)do  not significantly interfere with this assay.   However, presence of multiple variants may affect accuracy.       Hgb   Date Value Ref Range Status   02/14/2025 14.4 14.0 - 18.0 g/dL Final   05/20/2024 9.4 (L) 14.0 - 18.0 g/dL Final     Hemoglobin   Date Value Ref Range Status   06/06/2023 13.9 (L) 14.0 - 18.0 g/dL Final     Vitamin D   Date Value Ref Range Status   02/14/2025 18 (L) 30 - 80 ng/mL Final     WBC   Date Value Ref Range Status   02/14/2025 8.50 4.50 - 11.50 x10(3)/mcL Final   05/20/2024 12.12 (H) 4.50 - 11.50 x10(3)/mcL Final   05/08/2024 30.36 x10(3)/mcL Final   06/06/2023 7.22 3.90 - 12.70 K/uL Final       Radiology:  Two views right femur showing intact hardware no signs of displacement mild signs of healing at the nonunion site        Assessment:         1. 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    Venkata was seen today for post-op  evaluation.    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    PROCEDURES:  - # Procedures  - Removed staples from patient's lower extremity.    PATIENT INSTRUCTIONS:  - Continue to put weight on the affected lower extremity as tolerated.          Patient has a nonunion surgery on February 20, 2025.  He has been doing well.  We had a nail exchange and [employer] bio active glass placed into the femur.  Early signs of healing no signs of disruption appears to be walking well with the crutches.  Overall he appears to be doing quite well.  We will have him follow up in 6 weeks repeat x-rays      This note/OR report was created with the assistance of  voice recognition software or phone  dictation.  There may be transcription errors as a result of using this technology however minimal. Effort has been made to assure accuracy of transcription but any obvious errors or omissions should be clarified with the author of the document.     This note was generated with the assistance of ambient listening technology. Verbal consent was obtained by the patient and accompanying visitor(s) for the recording of patient appointment to facilitate this note. I attest to having reviewed and edited the generated note for accuracy, though some syntax or spelling errors may persist. Please contact the author of this note for any clarification.       Domingo Young DO  Orthopedic Trauma Surgery  03/13/2025      No future appointments.                  [1]   Current Outpatient Medications on File Prior to Visit   Medication Sig Dispense Refill    aspirin (ECOTRIN) 81 MG EC tablet Take 1 tablet (81 mg total) by mouth 2 (two) times a  day. 60 tablet 0    ergocalciferol (ERGOCALCIFEROL) 50,000 unit Cap Take 1 capsule (50,000 Units total) by mouth every 7 days. 12 capsule 0    benztropine (COGENTIN) 1 MG tablet Take 1 tablet (1 mg total) by mouth 2 (two) times daily. 60 tablet 1    [] oxyCODONE-acetaminophen (PERCOCET)  mg per tablet Take 1 tablet by mouth every 6 (six) hours as needed for Pain. 28 tablet 0    paliperidone palmitate (INVEGA SUSTENNA) 156 mg/mL Syrg injection Inject 1 mL (156 mg total) into the muscle every 30 days. 1 mL 1     No current facility-administered medications on file prior to visit.

## 2025-05-14 ENCOUNTER — HOSPITAL ENCOUNTER (OUTPATIENT)
Dept: RADIOLOGY | Facility: CLINIC | Age: 25
Discharge: HOME | End: 2025-05-14
Attending: ORTHOPAEDIC SURGERY
Payer: MEDICAID

## 2025-05-14 ENCOUNTER — OFFICE VISIT (OUTPATIENT)
Dept: ORTHOPEDICS | Facility: CLINIC | Age: 25
End: 2025-05-14
Payer: MEDICAID

## 2025-05-14 VITALS
HEART RATE: 89 BPM | DIASTOLIC BLOOD PRESSURE: 79 MMHG | BODY MASS INDEX: 26.95 KG/M2 | HEIGHT: 63 IN | WEIGHT: 152.13 LBS | SYSTOLIC BLOOD PRESSURE: 139 MMHG

## 2025-05-14 DIAGNOSIS — S72.351K CLOSED DISP COMMINUTED FRACTURE OF SHAFT OF RIGHT FEMUR WITH NONUNION: ICD-10-CM

## 2025-05-14 DIAGNOSIS — S72.351K CLOSED DISP COMMINUTED FRACTURE OF SHAFT OF RIGHT FEMUR WITH NONUNION: Primary | ICD-10-CM

## 2025-05-14 PROCEDURE — 1159F MED LIST DOCD IN RCRD: CPT | Mod: CPTII,,, | Performed by: PHYSICIAN ASSISTANT

## 2025-05-14 PROCEDURE — 73552 X-RAY EXAM OF FEMUR 2/>: CPT | Mod: RT,,, | Performed by: ORTHOPAEDIC SURGERY

## 2025-05-14 PROCEDURE — 99024 POSTOP FOLLOW-UP VISIT: CPT | Mod: ,,, | Performed by: PHYSICIAN ASSISTANT

## 2025-05-14 PROCEDURE — 3078F DIAST BP <80 MM HG: CPT | Mod: CPTII,,, | Performed by: PHYSICIAN ASSISTANT

## 2025-05-14 PROCEDURE — 3075F SYST BP GE 130 - 139MM HG: CPT | Mod: CPTII,,, | Performed by: PHYSICIAN ASSISTANT

## 2025-05-14 RX ORDER — ERGOCALCIFEROL 1.25 MG/1
50000 CAPSULE ORAL
Qty: 12 CAPSULE | Refills: 0 | Status: SHIPPED | OUTPATIENT
Start: 2025-05-14

## 2025-05-14 NOTE — PROGRESS NOTES
"  Subjective:       Patient ID: Venkata Douglas is a 25 y.o. male.  Chief Complaint   Patient presents with    Right Femur - Follow-up     12 WEEK F/U FROM [facility] RIGHT FEMUR FX. NO COMPLAINTS.         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  He does continue to use nicotine products.  He does continue to have mild dull achy pain in his fracture site.  States this is all the time even with walking much less more high impact activities such as running.  Incisions are well healed today and maturing.  He has not had a bone stimulator in the past.  He was given vitamin-D to take after he is labs were lower proximally 2 months ago and we did his nonunion repair.  Unsure if he took this or not.  He describes taking a green tablet.  No numbness or tingling distally to the right lower extremity.    ROS:  Constitutional: Denies fever chills  Eyes: No change in vision  ENT: No ringing or current infections  CV: No chest pain  Resp: No labored breathing  MSK: Pain evident at site of injury located in HPI,   Integ: No signs of abrasions or lacerations  Neuro: No numbness or tingling  Lymphatic: No swelling outside the area of injury     Medications Ordered Prior to Encounter[1]       Objective:      /79   Pulse 89   Ht 5' 3" (1.6 m)   Wt 69 kg (152 lb 1.9 oz)   BMI 26.95 kg/m²   Physical Exam  General the patient is alert and oriented x3 no acute distress nontoxic-appearing appropriate affect.    Constitutional: Vital signs are examined and stable.  Resp: No signs of labored breathing                        RLE: -Skin:  Incisions well healed and maturing           -MSK: : Hip and Knee F/E, EHL/FHL, Gastroc/Tib anterior Strength 5/5           -Neuro:  Sensation intact to light touch L3-S1 dermatomes           -Lymphatic: No signs of lymphadenopathy           -CV: Capillary refill is less than 2 seconds. DP pulse palpable          Body mass index is 26.95 kg/m².  Ideal body weight: " "56.9 kg (125 lb 7.1 oz)  Adjusted ideal body weight: 61.7 kg (136 lb 1.8 oz)  Hemoglobin A1C   Date Value Ref Range Status   06/08/2023 4.5 4.0 - 5.6 % Final     Comment:     ADA Screening Guidelines:  5.7-6.4%  Consistent with prediabetes  >or=6.5%  Consistent with diabetes    High levels of fetal hemoglobin interfere with the HbA1C  assay. Heterozygous hemoglobin variants (HbS, HgC, etc)do  not significantly interfere with this assay.   However, presence of multiple variants may affect accuracy.       Hgb   Date Value Ref Range Status   02/14/2025 14.4 14.0 - 18.0 g/dL Final   05/20/2024 9.4 (L) 14.0 - 18.0 g/dL Final     Hemoglobin   Date Value Ref Range Status   06/06/2023 13.9 (L) 14.0 - 18.0 g/dL Final     Hematocrit   Date Value Ref Range Status   06/06/2023 39.9 (L) 40.0 - 54.0 % Final     Hct   Date Value Ref Range Status   02/14/2025 43.1 42.0 - 52.0 % Final   05/20/2024 29.6 (L) 42.0 - 52.0 % Final     Iron Level   Date Value Ref Range Status   05/16/2024 67 65 - 175 ug/dL Final     No components found for: "FROLATE"  Vitamin D   Date Value Ref Range Status   02/14/2025 18 (L) 30 - 80 ng/mL Final     WBC   Date Value Ref Range Status   02/14/2025 8.50 4.50 - 11.50 x10(3)/mcL Final   05/20/2024 12.12 (H) 4.50 - 11.50 x10(3)/mcL Final   05/08/2024 30.36 x10(3)/mcL Final   06/06/2023 7.22 3.90 - 12.70 K/uL Final       Radiology:  Three-view x-ray right femur reveal hardware intact without signs of loosening or failure.  Patient is skeletally immature.  Fracture gap less than 1 cm.  Appears to be minimal consolidation as compared to previous images.  Consistent with nonunion.        Assessment:         1. 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    Venkata was seen today for follow-up.    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        -restart vitamin-D supplementation.  This was low at his last evaluation.  I suspect it remains low.  Again discussed the risk of nicotine and rolling nonunion.  Highly suggest that he stop nicotine use at this time.  He says that he did plan to.  Declines referral to smoking cessation clinic today.  Since he has not had a bone stimulator and willing to try this.  I am hopeful that we can get it approved.  We will order this today.    Continue weight-bearing as tolerated range of motion as tolerated.  Follow up if he has any significant pain at the fracture site.  Otherwise we will follow up in 6-8 weeks for repeat x-rays and evaluation.  Understands that his fracture does not go on to full union that he may need further surgery to include nail exchange versus removal of hardware and plating of his fracture fragment as well as possible bone graft from his contralateral femur or tibia.  -ED precautions given    The above findings, diagnostics, and treatment plan were discussed with Dr. Young who is in agreement with the plan of care except as stated in additional documentation.       Francia Lockhart PA-C          Future Appointments   Date Time Provider Department Center   8/14/2025 10:45 AM Domingo Young DO [facility]                  [1]   Current Outpatient Medications on File Prior to Visit   Medication Sig Dispense Refill    [DISCONTINUED] ergocalciferol (ERGOCALCIFEROL) 50,000 unit Cap Take 1 capsule (50,000 Units total) by mouth every 7 days. 12 capsule 0    aspirin (ECOTRIN) 81 MG EC tablet Take 1 tablet (81 mg total) by mouth 2 (two) times a day. 60 tablet 0    benztropine (COGENTIN) 1 MG tablet Take 1 tablet (1 mg total) by mouth 2 (two) times  daily. 60 tablet 1    paliperidone palmitate (INVEGA SUSTENNA) 156 mg/mL Syrg injection Inject 1 mL (156 mg total) into the muscle every 30 days. 1 mL 1     No current facility-administered medications on file prior to visit.

## 2025-08-14 ENCOUNTER — OFFICE VISIT (OUTPATIENT)
Dept: ORTHOPEDICS | Facility: CLINIC | Age: 25
End: 2025-08-14
Payer: MEDICAID

## 2025-08-14 ENCOUNTER — HOSPITAL ENCOUNTER (OUTPATIENT)
Dept: RADIOLOGY | Facility: CLINIC | Age: 25
Discharge: HOME | End: 2025-08-14
Attending: ORTHOPAEDIC SURGERY
Payer: MEDICAID

## 2025-08-14 VITALS
WEIGHT: 152.13 LBS | HEART RATE: 85 BPM | BODY MASS INDEX: 26.95 KG/M2 | HEIGHT: 63 IN | DIASTOLIC BLOOD PRESSURE: 78 MMHG | SYSTOLIC BLOOD PRESSURE: 125 MMHG

## 2025-08-14 DIAGNOSIS — S72.351K CLOSED DISP COMMINUTED FRACTURE OF SHAFT OF RIGHT FEMUR WITH NONUNION: Primary | ICD-10-CM

## 2025-08-14 DIAGNOSIS — S72.351K CLOSED DISP COMMINUTED FRACTURE OF SHAFT OF RIGHT FEMUR WITH NONUNION: ICD-10-CM

## 2025-08-14 DIAGNOSIS — S83.521A: ICD-10-CM

## 2025-08-14 PROCEDURE — 3074F SYST BP LT 130 MM HG: CPT | Mod: CPTII,,, | Performed by: ORTHOPAEDIC SURGERY

## 2025-08-14 PROCEDURE — 99214 OFFICE O/P EST MOD 30 MIN: CPT | Mod: ,,, | Performed by: ORTHOPAEDIC SURGERY

## 2025-08-14 PROCEDURE — 3078F DIAST BP <80 MM HG: CPT | Mod: CPTII,,, | Performed by: ORTHOPAEDIC SURGERY

## 2025-08-14 PROCEDURE — 3008F BODY MASS INDEX DOCD: CPT | Mod: CPTII,,, | Performed by: ORTHOPAEDIC SURGERY

## 2025-08-14 PROCEDURE — 73552 X-RAY EXAM OF FEMUR 2/>: CPT | Mod: RT,,, | Performed by: ORTHOPAEDIC SURGERY

## 2025-08-14 PROCEDURE — 1159F MED LIST DOCD IN RCRD: CPT | Mod: CPTII,,, | Performed by: ORTHOPAEDIC SURGERY

## (undated) DEVICE — SCREW BONE CORTICAL 2.7X22MM
Type: IMPLANTABLE DEVICE | Site: RADIUS | Status: NON-FUNCTIONAL
Removed: 2024-05-09

## (undated) DEVICE — PENCIL E-Z CLEAN ROCKER 15FT

## (undated) DEVICE — BANDAGE VELCLOSE ELAS 4INX5YD

## (undated) DEVICE — DRAPE HAND STERILE

## (undated) DEVICE — DRESSING GAUZE XEROFORM 5X9

## (undated) DEVICE — COVER FULLGUARD SHOE HIGH-TOP

## (undated) DEVICE — WIRE GUIDE 3.2MM 400MM
Type: IMPLANTABLE DEVICE | Site: FEMUR | Status: NON-FUNCTIONAL
Removed: 2025-02-20

## (undated) DEVICE — PADDING 4X4YD SPECIALIST100

## (undated) DEVICE — TRAY BASIN PLACENTA LAFAYETTE

## (undated) DEVICE — BANDAGE FLEX-MASTER CLP 6X11YD

## (undated) DEVICE — DRESSING XEROFORM 5X9IN

## (undated) DEVICE — GLOVE PROTEXIS PI CRM 7

## (undated) DEVICE — DRAPE ORTH SPLIT 77X108IN

## (undated) DEVICE — SOL NACL IRR 3000ML

## (undated) DEVICE — GLOVE 8 PROTEXIS PI ORTHO

## (undated) DEVICE — WIRE KIRSCHNER 1.6MM 6IN SS
Type: IMPLANTABLE DEVICE | Site: RADIUS | Status: NON-FUNCTIONAL
Removed: 2024-05-09

## (undated) DEVICE — BLADE SURG CARBON STEEL #10

## (undated) DEVICE — SPLINT FIBERGLASS PAD 4X15

## (undated) DEVICE — CUFF ATS 2 PORT SNGL BLDR 18IN

## (undated) DEVICE — SUT VICRYL BR 1 GEN 27 CT-1

## (undated) DEVICE — APPLICATOR CHLORAPREP ORN 26ML

## (undated) DEVICE — DRAPE C-ARMOR EQUIPMENT COVER

## (undated) DEVICE — COVER MAYO STAND REINFRCD 30

## (undated) DEVICE — BIT DRILL 2.0MM D DEPTH 110MM

## (undated) DEVICE — SOL NACL IRR 1000ML BTL

## (undated) DEVICE — PAD CAST 6X4YD SPECIALISTIC

## (undated) DEVICE — STAPLER SKIN PROXIMATE WIDE

## (undated) DEVICE — SPONGE GAUZE 4X4 12 PLY STRL

## (undated) DEVICE — DRAPE STERI U-SHAPED 47X51IN

## (undated) DEVICE — GLOVE PROTEXIS BLUE LATEX 9

## (undated) DEVICE — COVER TABLE HVY DTY 60X90IN

## (undated) DEVICE — IMPLANTABLE DEVICE
Type: IMPLANTABLE DEVICE | Site: FEMUR | Status: NON-FUNCTIONAL
Removed: 2024-05-08

## (undated) DEVICE — COVER EQUIPMENT 36X25

## (undated) DEVICE — SUT ETHILON 3-0 FS-1 30

## (undated) DEVICE — SUT CTD VICRYL CT-1 27

## (undated) DEVICE — ELECTRODE REM POLYHESIVE II

## (undated) DEVICE — BIT DRILL XLN 4.2MM

## (undated) DEVICE — GLOVE SURG SZ7 PLYSPHRN ORTH

## (undated) DEVICE — ELECTRODE PATIENT RETURN DISP

## (undated) DEVICE — DRESSING WND GZ 10X4X8X2IN

## (undated) DEVICE — YANKAUER FLEX NO VENT REG CAP

## (undated) DEVICE — GLOVE 7.0 PROTEXIS PI BLUE

## (undated) DEVICE — DRESSING TELFA + BARR 4X6IN

## (undated) DEVICE — TUBE SUCTION MEDI-VAC STERILE

## (undated) DEVICE — GOWN SMARTSLEEVE AAMI LVL4 XL

## (undated) DEVICE — IMPLANTABLE DEVICE
Type: IMPLANTABLE DEVICE | Site: FEMUR | Status: NON-FUNCTIONAL
Removed: 2025-02-20

## (undated) DEVICE — BLADE SURG STAINLESS STEEL #15

## (undated) DEVICE — HANDLE DEVON RIGID OR LIGHT

## (undated) DEVICE — TAPE SILK 3IN

## (undated) DEVICE — SPONGE COTTON TRAY 4X4IN

## (undated) DEVICE — SUT MCRYL PLUS 2-0 CT-1 36IN

## (undated) DEVICE — DRAPE IOBAN 2 STERI

## (undated) DEVICE — SUT PDS PLUS MONO 1 CT 36IN

## (undated) DEVICE — SUT VICRYL 2-0 27 CT-1

## (undated) DEVICE — GOWN SURGICAL XX LARGE X LONG

## (undated) DEVICE — GLOVE SIGNATURE MICRO LTX 6

## (undated) DEVICE — BIT DRILL 4.2MM 3 FLUTD 145MM

## (undated) DEVICE — Device

## (undated) DEVICE — GLOVE PROTEXIS BLUE LATEX 7

## (undated) DEVICE — DRIVER

## (undated) DEVICE — BIT DRILL DVR 2.2MM

## (undated) DEVICE — GOWN SMARTGOWN 3XL XLONG

## (undated) DEVICE — GLOVE PROTEXIS LTX MICRO 8

## (undated) DEVICE — KIT SURGICAL TURNOVER

## (undated) DEVICE — SUT BONE WAX 2.5 GRMS 12/BX

## (undated) DEVICE — WIRE GUIDE 3.2MM 400MM
Type: IMPLANTABLE DEVICE | Site: FEMUR | Status: NON-FUNCTIONAL
Removed: 2024-05-09

## (undated) DEVICE — GLOVE PROTEXIS NEU-THERA SZ6

## (undated) DEVICE — PADDING WYTEX UNDRCST 6INX4YD

## (undated) DEVICE — GLOVE PROTEXIS HYDROGEL SZ9

## (undated) DEVICE — SUT MONOCRYL 2-0 S UND

## (undated) DEVICE — SUT ETHILON 2-0 FS 18IN BLK

## (undated) DEVICE — IRRIGATION SET Y-TYPE TUR/BLAD